# Patient Record
Sex: MALE | Race: ASIAN | Employment: OTHER | URBAN - METROPOLITAN AREA
[De-identification: names, ages, dates, MRNs, and addresses within clinical notes are randomized per-mention and may not be internally consistent; named-entity substitution may affect disease eponyms.]

---

## 2017-03-03 ENCOUNTER — CARE COORDINATION (OUTPATIENT)
Dept: GERIATRIC MEDICINE | Facility: CLINIC | Age: 82
End: 2017-03-03

## 2017-03-03 NOTE — PROGRESS NOTES
[facility] Six-Month Telephone Assessment    6 month telephone assessment completed on 3/3/17. Spoke with Aburto, daughter. She said her dad is doing all right, no concerns.    ER visits: No  Hospitalizations: No  TCU stays: No  Significant health status changes: none  Falls/Injuries: No  ADL/IADL changes: No  Changes in services: No    Caregiver Assessment follow up:  N/A    Goals: See POC in chart for goal progress documentation.      Will see client in 6 months for an annual health risk assessment.   Encouraged client to call CM with any questions or concerns in the meantime.     Corrie ROTHMAN, [facility] Care Coordinator   Telephone: [phone]  Fax: [phone]

## 2017-04-18 ENCOUNTER — OFFICE VISIT (OUTPATIENT)
Dept: FAMILY MEDICINE | Facility: CLINIC | Age: 82
End: 2017-04-18
Payer: COMMERCIAL

## 2017-04-18 VITALS
TEMPERATURE: 98.2 F | BODY MASS INDEX: 23.3 KG/M2 | WEIGHT: 145 LBS | HEART RATE: 87 BPM | OXYGEN SATURATION: 96 % | HEIGHT: 66 IN | DIASTOLIC BLOOD PRESSURE: 76 MMHG | SYSTOLIC BLOOD PRESSURE: 138 MMHG

## 2017-04-18 DIAGNOSIS — R73.01 IMPAIRED FASTING GLUCOSE: ICD-10-CM

## 2017-04-18 DIAGNOSIS — R10.13 ABDOMINAL PAIN, EPIGASTRIC: ICD-10-CM

## 2017-04-18 DIAGNOSIS — R10.13 DYSPEPSIA: Primary | ICD-10-CM

## 2017-04-18 LAB
ALBUMIN SERPL-MCNC: 3.4 G/DL (ref 3.4–5)
ALP SERPL-CCNC: 69 U/L (ref 40–150)
ALT SERPL W P-5'-P-CCNC: 36 U/L (ref 0–70)
ANION GAP SERPL CALCULATED.3IONS-SCNC: 10 MMOL/L (ref 3–14)
AST SERPL W P-5'-P-CCNC: 26 U/L (ref 0–45)
BASOPHILS # BLD AUTO: 0 10E9/L (ref 0–0.2)
BASOPHILS NFR BLD AUTO: 0.6 %
BILIRUB SERPL-MCNC: 0.4 MG/DL (ref 0.2–1.3)
BUN SERPL-MCNC: 21 MG/DL (ref 7–30)
CALCIUM SERPL-MCNC: 9.1 MG/DL (ref 8.5–10.1)
CHLORIDE SERPL-SCNC: 101 MMOL/L (ref 94–109)
CO2 SERPL-SCNC: 29 MMOL/L (ref 20–32)
CREAT SERPL-MCNC: 0.8 MG/DL (ref 0.66–1.25)
DIFFERENTIAL METHOD BLD: NORMAL
EOSINOPHIL # BLD AUTO: 0.4 10E9/L (ref 0–0.7)
EOSINOPHIL NFR BLD AUTO: 7.4 %
ERYTHROCYTE [DISTWIDTH] IN BLOOD BY AUTOMATED COUNT: 12.8 % (ref 10–15)
GFR SERPL CREATININE-BSD FRML MDRD: ABNORMAL ML/MIN/1.7M2
GLUCOSE SERPL-MCNC: 176 MG/DL (ref 70–99)
HBA1C MFR BLD: 7.3 % (ref 4.3–6)
HCT VFR BLD AUTO: 45 % (ref 40–53)
HGB BLD-MCNC: 16 G/DL (ref 13.3–17.7)
LIPASE SERPL-CCNC: 310 U/L (ref 73–393)
LYMPHOCYTES # BLD AUTO: 1.5 10E9/L (ref 0.8–5.3)
LYMPHOCYTES NFR BLD AUTO: 28 %
MCH RBC QN AUTO: 32.7 PG (ref 26.5–33)
MCHC RBC AUTO-ENTMCNC: 35.6 G/DL (ref 31.5–36.5)
MCV RBC AUTO: 92 FL (ref 78–100)
MONOCYTES # BLD AUTO: 0.5 10E9/L (ref 0–1.3)
MONOCYTES NFR BLD AUTO: 10 %
NEUTROPHILS # BLD AUTO: 2.9 10E9/L (ref 1.6–8.3)
NEUTROPHILS NFR BLD AUTO: 54 %
PLATELET # BLD AUTO: 277 10E9/L (ref 150–450)
POTASSIUM SERPL-SCNC: 3.6 MMOL/L (ref 3.4–5.3)
PROT SERPL-MCNC: 7.3 G/DL (ref 6.8–8.8)
RBC # BLD AUTO: 4.89 10E12/L (ref 4.4–5.9)
SODIUM SERPL-SCNC: 140 MMOL/L (ref 133–144)
WBC # BLD AUTO: 5.3 10E9/L (ref 4–11)

## 2017-04-18 PROCEDURE — 80053 COMPREHEN METABOLIC PANEL: CPT | Performed by: FAMILY MEDICINE

## 2017-04-18 PROCEDURE — 83690 ASSAY OF LIPASE: CPT | Performed by: FAMILY MEDICINE

## 2017-04-18 PROCEDURE — 99213 OFFICE O/P EST LOW 20 MIN: CPT | Performed by: FAMILY MEDICINE

## 2017-04-18 PROCEDURE — 83036 HEMOGLOBIN GLYCOSYLATED A1C: CPT | Performed by: FAMILY MEDICINE

## 2017-04-18 PROCEDURE — 36415 COLL VENOUS BLD VENIPUNCTURE: CPT | Performed by: FAMILY MEDICINE

## 2017-04-18 PROCEDURE — 85025 COMPLETE CBC W/AUTO DIFF WBC: CPT | Performed by: FAMILY MEDICINE

## 2017-04-18 ASSESSMENT — PAIN SCALES - GENERAL: PAINLEVEL: MODERATE PAIN (5)

## 2017-04-18 NOTE — PATIENT INSTRUCTIONS
We will send you lab results    Start ranitidine one pill 2x daily    Continue other meds as is    Follow up as needed based on symptoms

## 2017-04-18 NOTE — PROGRESS NOTES
SUBJECTIVE:                                                    Leona David is a 85 year old male who presents to clinic today for the following health issues:       Stomach pain for a month    none    Problem list and histories reviewed & adjusted, as indicated.  Additional history: as documented         Reviewed and updated as needed this visit by clinical staff  Tobacco  Allergies  Meds  Problems  Med Hx  Surg Hx  Fam Hx  Soc Hx        Reviewed and updated as needed this visit by Provider          mid abdomen    For a month    Off and on    Often at night    15-20 minutes, up to an hour     Light pain    No nausea / vomiting        Eating does not make it worse    Feels bloated    Worse with lying down    History of hernia    Trying to eat veggies    Pain not worse with lifting    Eating okay    Bowels okay    No bloody or black stools  Just occasional bit of blood at anal area/ hemorrhoid type    Urinating okay but 2-3 x at night to urinate     Occasional gets acid taste in mouth    Dry mouth in am, feels bittter    Rare etoh        Physical Exam   Constitutional: He is oriented to person, place, and time and well-developed, well-nourished, and in no distress. No distress.   HENT:   Head: Normocephalic and atraumatic.   Eyes: Conjunctivae are normal.   Neck: Carotid bruit is not present.   Cardiovascular: Normal rate, regular rhythm, normal heart sounds and intact distal pulses.  Exam reveals no gallop and no friction rub.    No murmur heard.  Pulmonary/Chest: Effort normal and breath sounds normal. No respiratory distress. He has no wheezes. He has no rales.   Abdominal: Soft. Bowel sounds are normal. He exhibits no distension and no mass. There is no tenderness. There is no rebound and no guarding.   No back or costovertebral angle tenderness    Musculoskeletal: He exhibits no edema.   Neurological: He is alert and oriented to person, place, and time.   Skin: He is not diaphoretic.   Psychiatric: Mood and  affect normal.     Wt is stable      ASSESSMENT / PLAN:  (R10.13) Dyspepsia  (primary encounter diagnosis)  Comment: since symptoms occur when supine at night, possible acid factor.    Plan: ranitidine (ZANTAC) 150 MG tablet        Trial of regular h2 blocker    (R10.13) Abdominal pain, epigastric  Comment: check labs   Plan: CBC with platelets differential, Comprehensive         metabolic panel, Lipase             (R73.01) Impaired fasting glucose  Comment: recheck this also; not on meds for diabetes mellitus   Plan: Hemoglobin A1c             Follow up as needed based on symptoms       I reviewed the patient's medications, allergies, medical history, family history, and social history.    Arie Dahl MD

## 2017-04-18 NOTE — NURSING NOTE
"Chief Complaint   Patient presents with     Gastric Problem       Initial /90 (BP Location: Left arm, Patient Position: Chair, Cuff Size: Adult Regular)  Pulse 87  Temp 98.2  F (36.8  C) (Oral)  Ht 5' 6.3\" (1.684 m)  Wt 145 lb (65.8 kg)  SpO2 96%  BMI 23.19 kg/m2 Estimated body mass index is 23.19 kg/(m^2) as calculated from the following:    Height as of this encounter: 5' 6.3\" (1.684 m).    Weight as of this encounter: 145 lb (65.8 kg).  Medication Reconciliation: complete   Jamila Mcmanus CMA      "

## 2017-04-18 NOTE — LETTER
[facility]  [address] [place]  [address]  [phone]        April 20, 2017    Fredyu X Frankie  [address]  [facility] [address]        Dear Thu,    The diabetes test ( hemoglobin a1c ) is higher than last time, but still no need for diabetes medication.     We should recheck this in about 6 months.     Other labs are fine.     Results for orders placed or performed in visit on 04/18/17   CBC with platelets differential   Result Value Ref Range    WBC 5.3 4.0 - 11.0 10e9/L    RBC Count 4.89 4.4 - 5.9 10e12/L    Hemoglobin 16.0 13.3 - 17.7 g/dL    Hematocrit 45.0 40.0 - 53.0 %    MCV 92 78 - 100 fl    MCH 32.7 26.5 - 33.0 pg    MCHC 35.6 31.5 - 36.5 g/dL    RDW 12.8 10.0 - 15.0 %    Platelet Count 277 150 - 450 10e9/L    Diff Method Automated Method     % Neutrophils 54.0 %    % Lymphocytes 28.0 %    % Monocytes 10.0 %    % Eosinophils 7.4 %    % Basophils 0.6 %    Absolute Neutrophil 2.9 1.6 - 8.3 10e9/L    Absolute Lymphocytes 1.5 0.8 - 5.3 10e9/L    Absolute Monocytes 0.5 0.0 - 1.3 10e9/L    Absolute Eosinophils 0.4 0.0 - 0.7 10e9/L    Absolute Basophils 0.0 0.0 - 0.2 10e9/L   Comprehensive metabolic panel   Result Value Ref Range    Sodium 140 133 - 144 mmol/L    Potassium 3.6 3.4 - 5.3 mmol/L    Chloride 101 94 - 109 mmol/L    Carbon Dioxide 29 20 - 32 mmol/L    Anion Gap 10 3 - 14 mmol/L    Glucose 176 (H) 70 - 99 mg/dL    Urea Nitrogen 21 7 - 30 mg/dL    Creatinine 0.80 0.66 - 1.25 mg/dL    GFR Estimate >90  Non  GFR Calc   >60 mL/min/1.7m2    GFR Estimate If Black >90   GFR Calc   >60 mL/min/1.7m2    Calcium 9.1 8.5 - 10.1 mg/dL    Bilirubin Total 0.4 0.2 - 1.3 mg/dL    Albumin 3.4 3.4 - 5.0 g/dL    Protein Total 7.3 6.8 - 8.8 g/dL    Alkaline Phosphatase 69 40 - 150 U/L    ALT 36 0 - 70 U/L    AST 26 0 - 45 U/L   Lipase   Result Value Ref Range    Lipase 310 73 - 393 U/L   Hemoglobin A1c   Result Value Ref Range    Hemoglobin  A1C 7.3 (H) 4.3 - 6.0 %       If you have any questions please call the clinic at [phone].    Sincerely,    Arie PELAEZL

## 2017-04-18 NOTE — MR AVS SNAPSHOT
"              After Visit Summary   4/18/2017    Leona David    MRN: [id number]           Patient Information     Date Of Birth          10/20/1931        Visit Information        Provider Department      4/18/2017 11:00 AM Arie Dahl MD; [employer] [facility]        Today's Diagnoses     Dyspepsia    -  1    Abdominal pain, epigastric        Impaired fasting glucose          Care Instructions    We will send you lab results    Start ranitidine one pill 2x daily    Continue other meds as is    Follow up as needed based on symptoms         Follow-ups after your visit        Who to contact     If you have questions or need follow up information about today's clinic visit or your schedule please contact [facility] directly at [phone].  Normal or non-critical lab and imaging results will be communicated to you by MyChart, letter or phone within 4 business days after the clinic has received the results. If you do not hear from us within 7 days, please contact the clinic through [employer] or phone. If you have a critical or abnormal lab result, we will notify you by phone as soon as possible.  Submit refill requests through [employer] or call your pharmacy and they will forward the refill request to us. Please allow 3 business days for your refill to be completed.          Additional Information About Your Visit        MyChart Information     [employer] lets you send messages to your doctor, view your test results, renew your prescriptions, schedule appointments and more. To sign up, go to [email or web address] . Click on \"Log in\" on the left side of the screen, which will take you to the Welcome page. Then click on \"Sign up Now\" on the right side of the page.     You will be asked to enter the access code listed below, as well as some personal information. Please follow the directions to create your username and password.     Your access code is: " "[email or web address]  Expires: 2017 11:41 AM     Your access code will  in 90 days. If you need help or a new code, please call your [facility] or [phone].        Care EveryWhere ID     This is your Care EveryWhere ID. This could be used by other organizations to access your [place] medical records  [id number]        Your Vitals Were     Pulse Temperature Height Pulse Oximetry BMI (Body Mass Index)       87 98.2  F (36.8  C) (Oral) 5' 6.3\" (1.684 m) 96% 23.19 kg/m2        Blood Pressure from Last 3 Encounters:   17 138/76   16 129/77   11/27/15 140/74    Weight from Last 3 Encounters:   17 145 lb (65.8 kg)   16 140 lb (63.5 kg)   11/27/15 148 lb (67.1 kg)              We Performed the Following     CBC with platelets differential     Comprehensive metabolic panel     Hemoglobin A1c     Lipase          Today's Medication Changes          These changes are accurate as of: 17 11:41 AM.  If you have any questions, ask your nurse or doctor.               Start taking these medicines.        Dose/Directions    ranitidine 150 MG tablet   Commonly known as:  ZANTAC   Used for:  Dyspepsia   Started by:  Arie Dahl MD        Dose:  150 mg   Take 1 tablet (150 mg) by mouth 2 times daily   Quantity:  60 tablet   Refills:  11            Where to get your medicines      These medications were sent to [facility] [address] [place] [address]     Phone:  [phone]     ranitidine 150 MG tablet                Primary Care Provider Office Phone # Fax #    Arie Dahl [phone]       [facility] [address] [facility] [address]        Thank you!     Thank you for choosing [facility]  for your care. Our goal is always to provide you with excellent care. Hearing back from our patients is one way we can continue to improve our services. Please take " a few minutes to complete the written survey that you may receive in the mail after your visit with us. Thank you!             Your Updated Medication List - Protect others around you: Learn how to safely use, store and throw away your medicines at www.disposemymeds.org.          This list is accurate as of: 4/18/17 11:41 AM.  Always use your most recent med list.                   Brand Name Dispense Instructions for use    aspirin 81 MG EC tablet      1 TABLET DAILY       atorvastatin 10 MG tablet    LIPITOR    90 tablet    Take 1 tablet (10 mg) by mouth daily       CALCIUM 600/VITAMIN D3 PO      Take 600 mg by mouth daily       cyanocolbalamin 100 MCG tablet    vitamin  B-12     Take 50 mcg by mouth daily       Fish Oil 645 MG Caps      Take 645 mg by mouth daily       hydrochlorothiazide 12.5 MG Tabs tablet     90 tablet    Take 1 tablet (12.5 mg) by mouth daily       melatonin 3 MG tablet     90 tablet    Take 1 tablet (3 mg) by mouth nightly as needed for sleep       MULTIVITAMIN PO      Take by mouth daily       ranitidine 150 MG tablet    ZANTAC    60 tablet    Take 1 tablet (150 mg) by mouth 2 times daily       vitamin  B-1 250 MG Tabs      Take 250 mg by mouth daily

## 2017-04-20 NOTE — PROGRESS NOTES
The diabetes test ( hemoglobin a1c ) is higher than last time, but still no need for diabetes medication.    We should recheck this in about 6 months.    Other labs are fine.    Arie Dahl MD

## 2017-05-23 ENCOUNTER — TELEPHONE (OUTPATIENT)
Dept: FAMILY MEDICINE | Facility: CLINIC | Age: 82
End: 2017-05-23
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
## 2017-05-23 NOTE — TELEPHONE ENCOUNTER
Reason for Call:  question    Detailed comments: the patients daughter Lamonte Sampson wants to know if the patient should be seen because he was not feeling well and didn't want to fly on a air plane, please call her back     Phone Number Patient can be reached at: Other phone number:  [phone]    Best Time: any    Can we leave a detailed message on this number? YES    Call taken on 5/23/2017 at 8:25 AM by Susan Holman

## 2017-05-23 NOTE — TELEPHONE ENCOUNTER
TC notified patient's daughter Lamonte that there is a letter available for  at the [facility] .

## 2017-05-23 NOTE — LETTER
[address] [address] [phone]             May 23, 2017    To whom it may concern:    Leona David (  [birth year] ) is an 85 year old patient of mine who is not currently medically able to fly.               Sincerely,                     Arie Dahl MD

## 2017-08-22 ENCOUNTER — TELEPHONE (OUTPATIENT)
Dept: FAMILY MEDICINE | Facility: CLINIC | Age: 82
End: 2017-08-22

## 2017-08-22 NOTE — TELEPHONE ENCOUNTER
Forms received from: [facility]   Phone number listed: n/a   Fax listed: n/a  Date received: 8-22-17  Form description: health care summary  Once forms are completed, please return to [facility] via mail.  Is patient requesting to be contacted when forms are completed: n/a  Form placed: provider desk  Isatu Garcia

## 2017-09-06 ENCOUNTER — CARE COORDINATION (OUTPATIENT)
Dept: GERIATRIC MEDICINE | Facility: CLINIC | Age: 82
End: 2017-09-06
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
## 2017-09-27 ENCOUNTER — CARE COORDINATION (OUTPATIENT)
Dept: GERIATRIC MEDICINE | Facility: CLINIC | Age: 82
End: 2017-09-27

## 2017-09-27 NOTE — LETTER
[facility] [address] [address]  [phone]           2017    Order for Leona David (  10-- ):    Every other week RN visits.    Arie Dahl MD

## 2017-09-29 RX ORDER — SODIUM CHLORIDE 5 %
1 OINTMENT (GRAM) OPHTHALMIC (EYE) AT BEDTIME
COMMUNITY
End: 2022-04-27

## 2017-09-29 NOTE — PROGRESS NOTES
Home visit/Edy Risk Assessment/EW screening completed on: 9/27/17. Present was Thu, his wife; Lillian, daughter Lamonte. Lamonte also served an  per Thu's wishes.  Member resides: in a subsidized one bedroom apt with his wife.  Member currently receiving the following services: adult day care with transportation, homemaking  See EMR for a list of client's diagnoses and medications.   Medication management: Medications reviewed: Med reconciliation done. Medication management: Independent and sets up on own. Medication understanding: Patient has understanding of regimen and is adherent No, Thu forgets to take his medications some days. I saw his pill box and previous days still had pills in the compartments. Thu admitted he could use some help with his meds. I offered having a nurse set up a med machine. Thu agreed to this.  Falls:none reported.  ADL's/IADL's:  Independent with: ADL's.                                     Dependent with: all IADL's   Member Mood/behavior-PHQ9 score:  declined  [facility] sponsored benefits: Shared information re: Silver Sneakers/gym memberships, ASA, Calcium +D.  Plan of Care Is: adult day care and homemaking will continue. Referral for SKN visits will be made. Discussed HCD and he wants to write a letter to his kids explaining his wishes first, then he will do a HCD.  Caregiver support: daughter Lamonte is primary caregiver. She told me when her parents were not in the room that her dad is back at  so doesn't want me to bring up anything from the past. She will keep me informed if he changes his mind again. I told her it would be fine if he doesn't want to go, my concern is that he doesn't allow Lillian to go.   Reviewed Community wide emergency planning:  Release of Information: signed and in chart.  Follow-Up Plan: Member informed of future contact, plan to f/u with member with a 6 month telephone assessment.  Contact information shared with member and family, encouraged member  to call with any questions or concerns prior to this.  See [facility]UST for further detailed information    Corrie ROTHMAN, [facility] Care Coordinator   Telephone: [phone]  Fax: [phone]

## 2017-10-03 ENCOUNTER — CARE COORDINATION (OUTPATIENT)
Dept: GERIATRIC MEDICINE | Facility: CLINIC | Age: 82
End: 2017-10-03

## 2017-10-03 ENCOUNTER — TELEPHONE (OUTPATIENT)
Dept: FAMILY MEDICINE | Facility: CLINIC | Age: 82
End: 2017-10-03

## 2017-10-03 NOTE — PROGRESS NOTES
10/3/17-  I called  HC to make referral for SKN e/o/w for med set up and monitoring, was told Thu needs a F2F and an order from PCP.     Called Aburto to explain her dad needs a face to face with Dr Dahl in order for home care (nursing) to start. She is leaving in 2 weeks for [place] but will do her best to take him.     Corrie ROTHMAN, [facility] Care Coordinator   Telephone: [phone]  Fax: [phone]

## 2017-10-03 NOTE — TELEPHONE ENCOUNTER
Reason for Call:  Other call back    Detailed comments:  Patient is needing face to face for home care    Phone Number Patient can be reached at: [phone]    Best Time:  Anytime    Can we leave a detailed message on this number? YES    Call taken on 10/3/2017 at 1:12 PM by Yareli Rivas

## 2017-10-03 NOTE — TELEPHONE ENCOUNTER
"Attempt # 1  Called patient at home number.  Was call answered? Yes,   Daughter states father needs a face to face due to the  discovered patient forgot to take pills - wants HC to bring in a machine and set up pills for him and needs a face to face.  Daughter also states father complaining of pain in buttock when sits down due to \"flesh sticking out\" and has one on his neck also. Scheduled appointment for Wednesday.      Teresita Daniel RN  [facility]      "

## 2017-10-04 ENCOUNTER — TELEPHONE (OUTPATIENT)
Dept: FAMILY MEDICINE | Facility: CLINIC | Age: 82
End: 2017-10-04

## 2017-10-04 NOTE — TELEPHONE ENCOUNTER
Reason for Call: Request for an order or referral:    Order or referral being requested: Verbal nursing orders for Med management     Date needed: as soon as possible    Has the patient been seen by the PCP for this problem? YES      Phone number Patient can be reached at:  Other phone number: [phone]    Best Time:  any    Can we leave a detailed message on this number?  YES    Call taken on 10/4/2017 at 3:26 PM by Suasn Holman

## 2017-10-04 NOTE — TELEPHONE ENCOUNTER
Routing to PCP to review and advise.  Orders for med management  Teresita Daniel RN  [facility]

## 2017-10-04 NOTE — TELEPHONE ENCOUNTER
Attempt # 1  Called patient at home number.  Was call answered?  No, left detailed message and to call nurse line if questions.  Teresita Daniel RN  [facility]

## 2017-10-05 NOTE — PROGRESS NOTES
10/3/17-   Asked Savanna to make referral to [facility] for MD2 machine.    Received email from [facility].  I called this in. When you know more about the nurses that will be involved and who they should contact for set up, then call Gabriela at [phone]. Thanks !    Savanna Mckinney  Case Management Specialist     Corrie ROTHMAN, [facility] Care Coordinator   Telephone: [phone]  Fax: [phone]    10/4/17-  Received message from Ivette at [facility]. [phone]  She wondered if I am ordering a MD2 machine for Thu?    Corrie ROTHMAN, [facility] Care Coordinator   Telephone: [phone]  Fax: [phone]    10/5/17-  Tried Ivette back and spoke with her co-worker Clary. Let her know [facility] (Gabriela)would like to coordinate the nurse's visit with their install date so the machine can be set up. Gabriela's number is [phone].     Received a call back from Clary webb bit later. She said she got orders from the clinic but when she called the daughter to set up appt she said her dad refused to go to the clinic yesterday for the FtoF and he doesn't want the machine.     Lamonte also had left me a message that he told her he didn't feel good so couldn't go to the clinic but when she asked her mom if this was true, she said no, he was fine.   Lamonte said he is very stubborn and if he doesn't want something he will be resistive and you won't get anywhere. She will talk to him this weekend or next weekend and see if she can change his mind. Will put this on hold for now.     Corrie ROTHMAN, [facility] Care Coordinator   Telephone: [phone]  Fax: [phone]    10/6/17-  Called [facility] and spoke with Ivette. She will keep this request open for now until she hears back from me. She will let Dr Dahl know of delay.     Corrie ROTHMAN, [facility] Care Coordinator   Telephone: [phone]  Fax: [phone]

## 2017-10-06 ENCOUNTER — TELEPHONE (OUTPATIENT)
Dept: FAMILY MEDICINE | Facility: CLINIC | Age: 82
End: 2017-10-06

## 2017-10-06 NOTE — TELEPHONE ENCOUNTER
Routing to PCP to review.    See below message.    Teresita Daniel RN  [facility]

## 2017-10-06 NOTE — TELEPHONE ENCOUNTER
Reason for Call:  Other     Detailed comments: Nurse wanted provider to know that at this time patient has refused home care and that daughter is going to speak with him regarding this. At this time they will put the case on hold    Phone Number Patient can be reached at: Other phone number:    [facility] Ivette [phone]         Best Time:     Can we leave a detailed message on this number?     Call taken on 10/6/2017 at 11:53 AM by Melany Garcia

## 2017-10-18 ENCOUNTER — CARE COORDINATION (OUTPATIENT)
Dept: GERIATRIC MEDICINE | Facility: CLINIC | Age: 82
End: 2017-10-18

## 2017-10-23 NOTE — PROGRESS NOTES
10/18/17-  Received message from Ivette at [facility]. Asked if I have any updates regarding the med machine.     Corrie ROTHMAN, [facility] Care Coordinator   Telephone: [phone]  Fax: [phone]    10/19/17-  Called Aburto. She hasn't had a chance to talk to her dad yet but can still do it. She said her brother is taking him to the doctor on Wednesday for skin tags and a few other things. I will see if Dr Dahl will talk to Thu about the importance of taking his meds correctly by using the med machine.     Called Ivette at [facility] and let her co-worker know above info.    Corrie ROTHMAN, [facility] Care Coordinator   Telephone: [phone]  Fax: [phone]

## 2017-10-25 ENCOUNTER — OFFICE VISIT (OUTPATIENT)
Dept: FAMILY MEDICINE | Facility: CLINIC | Age: 82
End: 2017-10-25
Payer: COMMERCIAL

## 2017-10-25 VITALS
WEIGHT: 149 LBS | HEART RATE: 88 BPM | OXYGEN SATURATION: 98 % | BODY MASS INDEX: 23.83 KG/M2 | TEMPERATURE: 97.7 F | SYSTOLIC BLOOD PRESSURE: 123 MMHG | DIASTOLIC BLOOD PRESSURE: 74 MMHG

## 2017-10-25 DIAGNOSIS — L91.8 CUTANEOUS SKIN TAGS: ICD-10-CM

## 2017-10-25 DIAGNOSIS — I10 HYPERTENSION GOAL BP (BLOOD PRESSURE) < 140/90: ICD-10-CM

## 2017-10-25 DIAGNOSIS — R10.13 ABDOMINAL PAIN, EPIGASTRIC: Primary | ICD-10-CM

## 2017-10-25 PROCEDURE — 99213 OFFICE O/P EST LOW 20 MIN: CPT | Mod: 25 | Performed by: FAMILY MEDICINE

## 2017-10-25 PROCEDURE — 11200 RMVL SKIN TAGS UP TO&INC 15: CPT | Performed by: FAMILY MEDICINE

## 2017-10-25 ASSESSMENT — PAIN SCALES - GENERAL: PAINLEVEL: NO PAIN (0)

## 2017-10-25 NOTE — PROGRESS NOTES
SUBJECTIVE:   Leona David is a 86 year old male who presents to clinic today for the following health issues:       Remove skin tags from body  Blood test for H Pylori. States he has a stomach pain every morning. His wife had the same problem and she has H Pylori    none    Problem list and histories reviewed & adjusted, as indicated.  Additional history: as documented         Reviewed and updated as needed this visit by clinical staff  Allergies  Meds       Reviewed and updated as needed this visit by Provider          stomach pain off and on     Patient has never had h pylori before          Bowels and bladder okay      In April we had seen patient, advised ranitidine    Full physical not done     Mentation and affect are fine    No tremor of speech or extremity    On exam abd soft nontender.    He has multiple skin tags present.  A very large one left buttock area.  Multiple smaller ones neck/ torso.      He wanted these off.  With consent, we proceeded.      Wiped all with alcohol pads.  Use lidocaine with epi on the large buttock one.      Then proceeded to snip them all off with sterile scissors / tweezers.  No complications.  Tolerated fine.  Bandages applied as needed.  Removed 12 total.    ASSESSMENT / PLAN:  (R10.13) Abdominal pain, epigastric  (primary encounter diagnosis)  Comment: prudent to check h pylori.  Treat appropriately based on results.  Of note, the h2 blocker in spring did not resolve his symptoms.   Plan: H Pylori antigen, stool             (L91.8) Cutaneous skin tags  Comment: removed here   Plan: REMOVAL OF SKIN TAGS, FIRST 15        Follow up prn     (I10) Hypertension goal BP (blood pressure) < 140/90  Comment: at goal   Plan: no change       I reviewed the patient's medications, allergies, medical history, family history, and social history.    Arie Dahl MD

## 2017-10-25 NOTE — NURSING NOTE
"Chief Complaint   Patient presents with     Skin Tags     Patient Request     wants to be checked for H Pylori     Health Maintenance       Initial /74 (BP Location: Left arm, Patient Position: Chair, Cuff Size: Adult Regular)  Pulse 88  Temp 97.7  F (36.5  C) (Oral)  Wt 149 lb (67.6 kg)  SpO2 98%  BMI 23.83 kg/m2 Estimated body mass index is 23.83 kg/(m^2) as calculated from the following:    Height as of 4/18/17: 5' 6.3\" (1.684 m).    Weight as of this encounter: 149 lb (67.6 kg).  Medication Reconciliation: complete   Jamila Mcmanus CMA      "

## 2017-10-25 NOTE — MR AVS SNAPSHOT
"              After Visit Summary   10/25/2017    Leona David    MRN: [id number]           Patient Information     Date Of Birth          10/20/1931        Visit Information        Provider Department      10/25/2017 3:15 PM Arie Dahl MD; [employer] [facility]        Today's Diagnoses     Abdominal pain, epigastric    -  1      Care Instructions    Change bandages as needed for skin tags    Return the stool test    We will notify you of lab result          Follow-ups after your visit        Future tests that were ordered for you today     Open Future Orders        Priority Expected Expires Ordered    H Pylori antigen, stool Routine  11/24/2017 10/25/2017            Who to contact     If you have questions or need follow up information about today's clinic visit or your schedule please contact [facility] directly at [phone].  Normal or non-critical lab and imaging results will be communicated to you by MyChart, letter or phone within 4 business days after the clinic has received the results. If you do not hear from us within 7 days, please contact the clinic through MyChart or phone. If you have a critical or abnormal lab result, we will notify you by phone as soon as possible.  Submit refill requests through [email or web address] or call your pharmacy and they will forward the refill request to us. Please allow 3 business days for your refill to be completed.          Additional Information About Your Visit        Jule Gamehart Information     [email or web address] lets you send messages to your doctor, view your test results, renew your prescriptions, schedule appointments and more. To sign up, go to [email or web address] . Click on \"Log in\" on the left side of the screen, which will take you to the Welcome page. Then click on \"Sign up Now\" on the right side of the page.     You will be asked to enter the access code listed below, as well as some personal information. " Please follow the directions to create your username and password.     Your access code is: [email or web address]  Expires: 2018  3:54 PM     Your access code will  in 90 days. If you need help or a new code, please call your [facility] clinic or [phone].        Care EveryWhere ID     This is your Care EveryWhere ID. This could be used by other organizations to access your [facility] medical records  [id number]        Your Vitals Were     Pulse Temperature Pulse Oximetry BMI (Body Mass Index)          88 97.7  F (36.5  C) (Oral) 98% 23.83 kg/m2         Blood Pressure from Last 3 Encounters:   10/25/17 123/74   17 138/76   16 129/77    Weight from Last 3 Encounters:   10/25/17 149 lb (67.6 kg)   17 145 lb (65.8 kg)   16 140 lb (63.5 kg)               Primary Care Provider Office Phone # Fax #    Arie Dahl [phone]       [address] [facility] [address]        Equal Access to Services     [facility] : Hadii aad ku hadasho Soomaali, waaxda luqadaha, qaybta kaalmada adeegyada, virgilio leongin hayaan adeluigi woods . So [facility] [phone].    ATENCIÓN: Si habla español, tiene a hernandez disposición servicios gratuitos de asistencia lingüística. Llame al [phone].    We comply with applicable federal civil rights laws and [place] laws. We do not discriminate on the basis of race, color, national origin, age, disability, sex, sexual orientation, or gender identity.            Thank you!     Thank you for choosing [facility]  for your care. Our goal is always to provide you with excellent care. Hearing back from our patients is one way we can continue to improve our services. Please take a few minutes to complete the written survey that you may receive in the mail after your visit with us. Thank you!             Your Updated Medication List - Protect others around you: Learn how to safely use, store and throw away your medicines  at www.disposemymeds.org.          This list is accurate as of: 10/25/17  3:54 PM.  Always use your most recent med list.                   Brand Name Dispense Instructions for use Diagnosis    aspirin 81 MG EC tablet      1 TABLET DAILY        atorvastatin 10 MG tablet    LIPITOR    90 tablet    Take 1 tablet (10 mg) by mouth daily    Hyperlipidemia with target LDL less than 130       CALCIUM 600/VITAMIN D3 PO      Take 600 mg by mouth daily        cyanocolbalamin 100 MCG tablet    vitamin  B-12     Take 50 mcg by mouth daily        Fish Oil 645 MG Caps      Take 645 mg by mouth daily        hydrochlorothiazide 12.5 MG Tabs tablet     90 tablet    Take 1 tablet (12.5 mg) by mouth daily    Essential hypertension with goal blood pressure less than 140/90       MULTIVITAMIN PO      Take by mouth daily        sodium chloride 5 % ophthalmic ointment    EMILIO 128     1 Application At Bedtime        vitamin  B-1 250 MG Tabs      Take 250 mg by mouth daily

## 2017-10-25 NOTE — PATIENT INSTRUCTIONS
Change bandages as needed for skin tags    Return the stool test    We will notify you of lab result

## 2017-10-26 ENCOUNTER — CARE COORDINATION (OUTPATIENT)
Dept: GERIATRIC MEDICINE | Facility: CLINIC | Age: 82
End: 2017-10-26

## 2017-10-26 NOTE — PROGRESS NOTES
10/26/17-  Called Aburto to see if she knows how the appt went yesterday with her dad and PCP regarding the med machine. She hasn't had a chance to talk to her brother but will tonight and let me know tomorrow.     Corrie ROTHMAN, [facility] Care Coordinator   Telephone: [phone]  Fax: [phone]

## 2017-10-27 ENCOUNTER — CARE COORDINATION (OUTPATIENT)
Dept: GERIATRIC MEDICINE | Facility: CLINIC | Age: 82
End: 2017-10-27

## 2017-10-27 NOTE — PROGRESS NOTES
10/27/17-  Received call back from Lamonte. She talked to her brother who said the med machine issue wasn't brought up at the appt  We will drop this issue as Thu doesn't want the machine. Lamonte said she purchased a med minder box with more slots per day so maybe this will help her dad see all his meds he needs to take daily.     Corrie ROTHMAN, [facility] Coordinator   Telephone: [phone]  Fax: [phone]

## 2017-10-29 PROCEDURE — 87338 HPYLORI STOOL AG IA: CPT | Performed by: FAMILY MEDICINE

## 2017-10-30 DIAGNOSIS — R10.13 ABDOMINAL PAIN, EPIGASTRIC: ICD-10-CM

## 2017-10-30 NOTE — LETTER
[facility]  [address] [place]  [address]  [phone]        November 1, 2017    Thu X Frankie  [address]  [facility] [address]        Dear Thu,    As I explained to your daughter by phone, you do have the H pylori bacteria.  I sent in prescriptions to your pharmacy.  You take two antibiotics and one acid reducing pill, all 2x daily for 2 weeks.  After that, take the acid reducer ( omeprazole ) once daily for another month.    Follow up in clinic if symptoms not resolved after that.  Call or be seen sooner if needed.    One detail; do not take your cholesterol pill ( atorvastatin ) for the two weeks while on the antibiotics.    Results for orders placed or performed in visit on 10/30/17   H Pylori antigen, stool   Result Value Ref Range    Specimen Description Feces     H Pylori Antigen (A)      Positive for Helicobacter pylori antigen by enzyme immunoassay. A positive result   indicates the presence of H. pylori antigen.         If you have any questions please call the clinic at [phone].    Sincerely,    Arie SMITH

## 2017-10-31 LAB
H PYLORI AG STL QL IA: ABNORMAL
SPECIMEN SOURCE: ABNORMAL

## 2017-11-01 ENCOUNTER — TELEPHONE (OUTPATIENT)
Dept: FAMILY MEDICINE | Facility: CLINIC | Age: 82
End: 2017-11-01

## 2017-11-01 DIAGNOSIS — A04.8 H. PYLORI INFECTION: Primary | ICD-10-CM

## 2017-11-01 RX ORDER — CLARITHROMYCIN 500 MG
500 TABLET ORAL 2 TIMES DAILY
Qty: 28 TABLET | Refills: 0 | Status: SHIPPED | OUTPATIENT
Start: 2017-11-01 | End: 2017-11-15

## 2017-11-01 RX ORDER — AMOXICILLIN 500 MG/1
1000 CAPSULE ORAL 2 TIMES DAILY
Qty: 56 CAPSULE | Refills: 0 | Status: SHIPPED | OUTPATIENT
Start: 2017-11-01 | End: 2017-11-15

## 2017-11-01 NOTE — PROGRESS NOTES
As I explained to your daughter by phone, you do have the H pylori bacteria.  I sent in prescriptions to your pharmacy.  You take two antibiotics and one acid reducing pill, all 2x daily for 2 weeks.  After that, take the acid reducer ( omeprazole ) once daily for another month.    Follow up in clinic if symptoms not resolved after that.  Call or be seen sooner if needed.    One detail; do not take your cholesterol pill ( atorvastatin ) for the two weeks while on the antibiotics.    Arie Dahl MD

## 2017-11-01 NOTE — TELEPHONE ENCOUNTER
I called Samaria ( daughter ) about the h pylori result.  Positive, so sent in prescriptions. Follow up in nic if symptoms not resolving.     Arie Dahl MD

## 2017-11-07 ENCOUNTER — CARE COORDINATION (OUTPATIENT)
Dept: GERIATRIC MEDICINE | Facility: CLINIC | Age: 82
End: 2017-11-07

## 2017-11-07 DIAGNOSIS — Z76.89 HEALTH CARE HOME: ICD-10-CM

## 2017-11-10 NOTE — PROGRESS NOTES
11/7/17-  Received message from Will at [facility] [place]. He said he is getting out of the waiver system as it is not profitable. He will be transferring Thu's case to the [facility] office. Alice [phone].    Corrie ROTHMAN, [facility] Coordinator   Telephone: [phone]  Fax: [phone]    11/8/17-  Called Will back and LM. Asked end and start dates.    Corrie ROTHMAN, [facility] Coordinator   Telephone: [phone]  Fax: [phone]      11/10/17-  Called Alice. She said she will get out to do the opening the week of 11/20.  I will write the auth.    Corrie ROTHMAN, [facility] Coordinator   Telephone: [phone]  Fax: [phone]

## 2017-11-11 DIAGNOSIS — I10 ESSENTIAL HYPERTENSION WITH GOAL BLOOD PRESSURE LESS THAN 140/90: ICD-10-CM

## 2017-11-13 RX ORDER — HYDROCHLOROTHIAZIDE 12.5 MG/1
TABLET ORAL
Qty: 90 TABLET | Refills: 2 | Status: SHIPPED | OUTPATIENT
Start: 2017-11-13 | End: 2018-07-23

## 2017-11-13 NOTE — TELEPHONE ENCOUNTER
Requested Prescriptions   Pending Prescriptions Disp Refills     hydrochlorothiazide 12.5 MG TABS tablet [Pharmacy Med Name: HydroCHLOROthiazide Oral Tablet 12.5 MG] 90 tablet 2     Sig: TAKE ONE TABLET BY MOUTH ONE TIME DAILY    Diuretics (Including Combos) Protocol Passed    11/11/2017  7:00 AM       Passed - Blood pressure under 140/90    BP Readings from Last 3 Encounters:   10/25/17 123/74   04/18/17 138/76   11/01/16 129/77                Passed - Recent or future visit with authorizing provider's specialty    Patient had office visit in the last year or has a visit in the next 30 days with authorizing provider.  See chart review.              Passed - Patient is age 18 or older       Passed - Normal serum creatinine on file in past 12 months    Recent Labs   Lab Test  04/18/17   1144   CR  0.80             Passed - Normal serum potassium on file in past 12 months    Recent Labs   Lab Test  04/18/17   1144   POTASSIUM  3.6                   Passed - Normal serum sodium on file in past 12 months    Recent Labs   Lab Test  04/18/17   1144   NA  140

## 2017-11-13 NOTE — TELEPHONE ENCOUNTER
Prescription approved per [facility] Refill Protocol.    Yary Thomas RN  [facility]

## 2017-11-17 ENCOUNTER — CARE COORDINATION (OUTPATIENT)
Dept: GERIATRIC MEDICINE | Facility: CLINIC | Age: 82
End: 2017-11-17

## 2017-11-22 ENCOUNTER — OFFICE VISIT (OUTPATIENT)
Dept: FAMILY MEDICINE | Facility: CLINIC | Age: 82
End: 2017-11-22
Payer: COMMERCIAL

## 2017-11-22 VITALS
HEART RATE: 97 BPM | HEIGHT: 66 IN | DIASTOLIC BLOOD PRESSURE: 82 MMHG | WEIGHT: 145.8 LBS | BODY MASS INDEX: 23.43 KG/M2 | TEMPERATURE: 97 F | SYSTOLIC BLOOD PRESSURE: 152 MMHG

## 2017-11-22 DIAGNOSIS — I10 HYPERTENSION GOAL BP (BLOOD PRESSURE) < 140/90: ICD-10-CM

## 2017-11-22 DIAGNOSIS — R21 RASH: ICD-10-CM

## 2017-11-22 DIAGNOSIS — R25.1 TREMOR: Primary | ICD-10-CM

## 2017-11-22 DIAGNOSIS — R73.01 IMPAIRED FASTING GLUCOSE: ICD-10-CM

## 2017-11-22 DIAGNOSIS — R14.0 ABDOMINAL DISTENSION: ICD-10-CM

## 2017-11-22 DIAGNOSIS — R26.89 SHUFFLING GAIT: ICD-10-CM

## 2017-11-22 DIAGNOSIS — R41.89 COGNITIVE CHANGES: ICD-10-CM

## 2017-11-22 DIAGNOSIS — E78.5 HYPERLIPIDEMIA WITH TARGET LDL LESS THAN 130: ICD-10-CM

## 2017-11-22 DIAGNOSIS — R53.83 FATIGUE, UNSPECIFIED TYPE: ICD-10-CM

## 2017-11-22 DIAGNOSIS — A04.8 H. PYLORI INFECTION: ICD-10-CM

## 2017-11-22 LAB
ALBUMIN SERPL-MCNC: 3.4 G/DL (ref 3.4–5)
ALP SERPL-CCNC: 72 U/L (ref 40–150)
ALT SERPL W P-5'-P-CCNC: 44 U/L (ref 0–70)
ANION GAP SERPL CALCULATED.3IONS-SCNC: 11 MMOL/L (ref 3–14)
AST SERPL W P-5'-P-CCNC: 32 U/L (ref 0–45)
BASOPHILS # BLD AUTO: 0 10E9/L (ref 0–0.2)
BASOPHILS NFR BLD AUTO: 0.8 %
BILIRUB SERPL-MCNC: 0.3 MG/DL (ref 0.2–1.3)
BUN SERPL-MCNC: 16 MG/DL (ref 7–30)
CALCIUM SERPL-MCNC: 8.4 MG/DL (ref 8.5–10.1)
CHLORIDE SERPL-SCNC: 107 MMOL/L (ref 94–109)
CO2 SERPL-SCNC: 24 MMOL/L (ref 20–32)
CREAT SERPL-MCNC: 0.77 MG/DL (ref 0.66–1.25)
DIFFERENTIAL METHOD BLD: ABNORMAL
EOSINOPHIL # BLD AUTO: 0.2 10E9/L (ref 0–0.7)
EOSINOPHIL NFR BLD AUTO: 4.5 %
ERYTHROCYTE [DISTWIDTH] IN BLOOD BY AUTOMATED COUNT: 12.4 % (ref 10–15)
GFR SERPL CREATININE-BSD FRML MDRD: >90 ML/MIN/1.7M2
GLUCOSE SERPL-MCNC: 239 MG/DL (ref 70–99)
HBA1C MFR BLD: 8 % (ref 4.3–6)
HCT VFR BLD AUTO: 44.7 % (ref 40–53)
HGB BLD-MCNC: 15.8 G/DL (ref 13.3–17.7)
LYMPHOCYTES # BLD AUTO: 1 10E9/L (ref 0.8–5.3)
LYMPHOCYTES NFR BLD AUTO: 27.7 %
MCH RBC QN AUTO: 32.2 PG (ref 26.5–33)
MCHC RBC AUTO-ENTMCNC: 35.3 G/DL (ref 31.5–36.5)
MCV RBC AUTO: 91 FL (ref 78–100)
MONOCYTES # BLD AUTO: 0.3 10E9/L (ref 0–1.3)
MONOCYTES NFR BLD AUTO: 8.9 %
NEUTROPHILS # BLD AUTO: 2.1 10E9/L (ref 1.6–8.3)
NEUTROPHILS NFR BLD AUTO: 58.1 %
PLATELET # BLD AUTO: 124 10E9/L (ref 150–450)
POTASSIUM SERPL-SCNC: 3.6 MMOL/L (ref 3.4–5.3)
PROT SERPL-MCNC: 7.4 G/DL (ref 6.8–8.8)
RBC # BLD AUTO: 4.91 10E12/L (ref 4.4–5.9)
SODIUM SERPL-SCNC: 142 MMOL/L (ref 133–144)
TSH SERPL DL<=0.005 MIU/L-ACNC: 1.08 MU/L (ref 0.4–4)
WBC # BLD AUTO: 3.6 10E9/L (ref 4–11)

## 2017-11-22 PROCEDURE — 99214 OFFICE O/P EST MOD 30 MIN: CPT | Performed by: FAMILY MEDICINE

## 2017-11-22 PROCEDURE — 80050 GENERAL HEALTH PANEL: CPT | Performed by: FAMILY MEDICINE

## 2017-11-22 PROCEDURE — 36415 COLL VENOUS BLD VENIPUNCTURE: CPT | Performed by: FAMILY MEDICINE

## 2017-11-22 PROCEDURE — 83036 HEMOGLOBIN GLYCOSYLATED A1C: CPT | Performed by: FAMILY MEDICINE

## 2017-11-22 RX ORDER — ATORVASTATIN CALCIUM 10 MG/1
10 TABLET, FILM COATED ORAL DAILY
Qty: 90 TABLET | Refills: 3 | Status: SHIPPED | OUTPATIENT
Start: 2017-11-22 | End: 2022-04-27

## 2017-11-22 NOTE — PROGRESS NOTES
11/17/17-  Received message from Alice at [facility]. She said due to staffing issues, they are unable to take Leona David as a new client.     Corrie ROTHMAN, [facility] Care Coordinator   Telephone: [phone]  Fax: [phone]    11/20/17-  Received call from Lamonte. She said her dad has had some changes; his head is tilted to the side, he is shaking more and needs help with eating as he can't keep food on the silverware and needs food cut up in small pieces. He continues to be paranoid; thinks people are out to get him or kill him and has visual hallucinations; sees someone in the backseat of the car that isn't there.   Lamonte said their family is trying to manage taking care of him and her mother; Lamonte's brother is going over there daily to help him but it is getting to be too much. The family is thinking her mom might go live at her brother's if her dad needs more care and can't live alone.     Thu wouldn't let his wife go to adult day care while Lamonte was in [place] for 3 weeks. Lamonte said she is taking her dad to see Dr Dahl on Wed about all of this. She will keep me posted. I also let her know about [facility] and discontinuing homemaking. I told her we can look at next steps after PCP visit.     Corrie ROTHMAN, [facility] Care Coordinator   Telephone: [phone]  Fax: [phone]

## 2017-11-22 NOTE — PROGRESS NOTES
SUBJECTIVE:   Leona David is a 86 year old male who presents to clinic today for the following health issues:      Rash  Onset: 1 month    Description:   Location:  neck  Character: raised  Itching (Pruritis): no     Progression of Symptoms:  worsening    Accompanying Signs & Symptoms:  Fever: no   Body aches or joint pain: YES  Sore throat symptoms: no   Recent cold symptoms: no     History:   Previous similar rash: no     Precipitating factors:   Exposure to similar rash: no   New exposures: None   Recent travel: no     Alleviating factors:  tilting    Therapies Tried and outcome: None    Pt is having illusion and cannot tilt his head straight.            Problem list and histories reviewed & adjusted, as indicated.  Additional history: as documented         Reviewed and updated as needed this visit by clinical staffTobacco  Allergies  Med Hx  Surg Hx  Fam Hx  Soc Hx      Reviewed and updated as needed this visit by Provider          took the h pylori meds, finished    No abd pain now      Head tends to bend to right    No pain    Tried icy hot on neck   The rash area seems to be getting better per patient        Exam:  Patient has fairly good range of motion     When getting papers out of large envelope, he had some tremor present    On exam he has occasional pill rolling type tremor    Had seen neurology about 10 years ago, unclear diagnosis    On back of neck patient has a few mildly red raised areas. A couple right side of neck but a few more on left side.  Almost like acne like lesions    Per daughter, patient is having weird thoughts    ASSESSMENT / PLAN:  (R25.1) Tremor  (primary encounter diagnosis)  Comment: prudent to see neurology   Plan: NEUROLOGY ADULT REFERRAL        Daughter will call and schedule; gave them various options on schedule    (R26.89) Shuffling gait  Comment: as above   Plan: NEUROLOGY ADULT REFERRAL        Suspicious for parkinsons    (R41.89) Cognitive changes  Comment: as above    Plan: NEUROLOGY ADULT REFERRAL            (E78.5) Hyperlipidemia with target LDL less than 130  Comment: patient can restart this   Plan: atorvastatin (LIPITOR) 10 MG tablet        Sent in prescription     (R14.0) Abdominal distension  Comment: ultrasound and labs   Plan: US Abdomen Complete             (R53.83) Fatigue, unspecified type  Comment: check   Plan: TSH with free T4 reflex, CBC with platelets         differential, Comprehensive metabolic panel             (R73.01) Impaired fasting glucose  Comment: check   Plan: Hemoglobin A1c             (R21) Rash  Comment: this is improving  Plan: monitor     (A04.8) H. pylori infection  Comment: patient finished prescriptions for this, no symptoms currently   Plan: as above.  No need to recheck for h pylori with no symptoms.      Blood pressure high today but just keep the hctz as is.  Monitor.      I reviewed the patient's medications, allergies, medical history, family history, and social history.    Arie Dahl MD      Heart and lungs okay    Only mild edema bilat    abd soft, perhaps mildly distended    Some liver problems in family     I had patient walk in beyer, some shuffling of gait;   Patient seems fairly steady    See above for a/p.  Arie Dahl MD

## 2017-11-22 NOTE — LETTER
[facility] [place]   [address] [place]  [address]  [phone]                                   November 24, 2017    Thu BOOM David  [address] [facility] [address]        Dear u,    The overall blood sugar test ( hemoglobin a1c ) is higher than last time.  Advise healthy diet/ increase exercise as you are able, and we can then recheck this in about 3 months.    The main priority is the neurology consult.  Call to schedule that.    Results for orders placed or performed in visit on 11/22/17   TSH with free T4 reflex   Result Value Ref Range    TSH 1.08 0.40 - 4.00 mU/L   CBC with platelets differential   Result Value Ref Range    WBC 3.6 (L) 4.0 - 11.0 10e9/L    RBC Count 4.91 4.4 - 5.9 10e12/L    Hemoglobin 15.8 13.3 - 17.7 g/dL    Hematocrit 44.7 40.0 - 53.0 %    MCV 91 78 - 100 fl    MCH 32.2 26.5 - 33.0 pg    MCHC 35.3 31.5 - 36.5 g/dL    RDW 12.4 10.0 - 15.0 %    Platelet Count 124 (L) 150 - 450 10e9/L    Diff Method Automated Method     % Neutrophils 58.1 %    % Lymphocytes 27.7 %    % Monocytes 8.9 %    % Eosinophils 4.5 %    % Basophils 0.8 %    Absolute Neutrophil 2.1 1.6 - 8.3 10e9/L    Absolute Lymphocytes 1.0 0.8 - 5.3 10e9/L    Absolute Monocytes 0.3 0.0 - 1.3 10e9/L    Absolute Eosinophils 0.2 0.0 - 0.7 10e9/L    Absolute Basophils 0.0 0.0 - 0.2 10e9/L   Comprehensive metabolic panel   Result Value Ref Range    Sodium 142 133 - 144 mmol/L    Potassium 3.6 3.4 - 5.3 mmol/L    Chloride 107 94 - 109 mmol/L    Carbon Dioxide 24 20 - 32 mmol/L    Anion Gap 11 3 - 14 mmol/L    Glucose 239 (H) 70 - 99 mg/dL    Urea Nitrogen 16 7 - 30 mg/dL    Creatinine 0.77 0.66 - 1.25 mg/dL    GFR Estimate >90 >60 mL/min/1.7m2    GFR Estimate If Black >90 >60 mL/min/1.7m2    Calcium 8.4 (L) 8.5 - 10.1 mg/dL    Bilirubin Total 0.3 0.2 - 1.3 mg/dL    Albumin 3.4 3.4 - 5.0 g/dL    Protein Total 7.4 6.8 - 8.8 g/dL    Alkaline Phosphatase 72 40 - 150 U/L    ALT 44 0 - 70  U/L    AST 32 0 - 45 U/L   Hemoglobin A1c   Result Value Ref Range    Hemoglobin A1C 8.0 (H) 4.3 - 6.0 %       If you have any questions please call the clinic at [phone]    Sincerely,    Arie Dahl MD    bmd

## 2017-11-22 NOTE — PATIENT INSTRUCTIONS
Stay on hydrochlorothiazide    Restart the lipitor ( atorvastatin)     We will schedule ultrasound of abdomen    We will send you lab results    Call to schedule consult with neurology - possible Parkinsons Disease

## 2017-11-22 NOTE — MR AVS SNAPSHOT
After Visit Summary   11/22/2017    Leona David    MRN: [id number]           Patient Information     Date Of Birth          10/20/1931        Visit Information        Provider Department      11/22/2017 1:30 PM Arie Dahl MD; [employer] [facility]        Today's Diagnoses     Tremor    -  1    Shuffling gait        Cognitive changes        Hyperlipidemia with target LDL less than 130        Abdominal distension        Fatigue, unspecified type        Impaired fasting glucose          Care Instructions    Stay on hydrochlorothiazide    Restart the lipitor ( atorvastatin)     We will schedule ultrasound of abdomen    We will send you lab results    Call to schedule consult with neurology - possible Parkinsons Disease            Follow-ups after your visit        Additional Services     NEUROLOGY ADULT REFERRAL       Your provider has referred you for the following:   Consult at [facility]: [facility] [place] [phone]   [email or web address]://[email or web address].[facility] [[email or web address]].[facility]/  [facility]: [facility] - [place] [phone]   [email or web address]://[email or web address].[facility] [[email or web address]].[email or web address]/Clinics/neurology-clinic/  General Neurology  [facility]: [facility] [place] [phone]   [email or web address]://www.[facility]/locations.html  [place] [phone]   [email or web address]://www.[email or web address]/locations.[email or web address]  [place] [phone]   http://www.[facility]/locations.html  [place] [phone]   http://www.[facility]/locations.html  [facility]: [facility] [phone]   [email or web address]://[email or web address]  [place] [phone]   http://www.[facility]  [place] [phone]   [email or web address]://[email or web address]    Please be aware that coverage of these services is subject to the terms and limitations of your health insurance plan.  Call member services at your health plan with any benefit or  coverage questions.      Please bring the following with you to your appointment:    (1) Any X-Rays, CTs or MRIs which have been performed.  Contact the facility where they were done to arrange for  prior to your scheduled appointment.    (2) List of current medications  (3) This referral request   (4) Any documents/labs given to you for this referral                  Your next 10 appointments already scheduled     Nov 24, 2017  8:15 AM CST   US ABDOMEN COMPLETE with [id number]    [address] [place] [id number] [phone]           Please bring a list of your medicines (including vitamins, minerals and over-the-counter drugs). Also, tell your doctor about any allergies you may have. Wear comfortable clothes and leave your valuables at home.  Adults: No eating or drinking for 8 hours before the exam. You may take medicine with a small sip of water.  Children: - Children 6+ years: No food or drink for 6 hours before exam. - Children 1-5 years: No food or drink for 4 hours before exam. - Infants, breast-fed: may have breast milk up to 2 hours before exam. - Infants, formula: may have bottle until 4 hours before exam.  Please call the Imaging Department at your exam site with any questions.              Future tests that were ordered for you today     Open Future Orders        Priority Expected Expires Ordered    US Abdomen Complete Routine  11/22/2018 11/22/2017            Who to contact     If you have questions or need follow up information about today's clinic visit or your schedule please contact [facility] directly at [phone].  Normal or non-critical lab and imaging results will be communicated to you by MyChart, letter or phone within 4 business days after the clinic has received the results. If you do not hear from us within 7 days, please contact the clinic through MyChart or phone. If you  "have a critical or abnormal lab result, we will notify you by phone as soon as possible.  Submit refill requests through [employer] or call your pharmacy and they will forward the refill request to us. Please allow 3 business days for your refill to be completed.          Additional Information About Your Visit        [employer] Information     [employer] lets you send messages to your doctor, view your test results, renew your prescriptions, schedule appointments and more. To sign up, go to [email or web address] . Click on \"Log in\" on the left side of the screen, which will take you to the Welcome page. Then click on \"Sign up Now\" on the right side of the page.     You will be asked to enter the access code listed below, as well as some personal information. Please follow the directions to create your username and password.     Your access code is: [email or web address]  Expires: 2018  2:54 PM     Your access code will  in 90 days. If you need help or a new code, please call your [facility] clinic or [phone].        Care EveryWhere ID     This is your Care EveryWhere ID. This could be used by other organizations to access your [facility] medical records  [id number]        Your Vitals Were     Pulse Temperature Height BMI (Body Mass Index)          97 97  F (36.1  C) (Oral) 5' 6.3\" (1.684 m) 23.32 kg/m2         Blood Pressure from Last 3 Encounters:   17 152/82   10/25/17 123/74   17 138/76    Weight from Last 3 Encounters:   17 145 lb 12.8 oz (66.1 kg)   10/25/17 149 lb (67.6 kg)   17 145 lb (65.8 kg)              We Performed the Following     CBC with platelets differential     Comprehensive metabolic panel     Hemoglobin A1c     NEUROLOGY ADULT REFERRAL     TSH with free T4 reflex          Where to get your medicines      These medications were sent to [facility] [address] [place] [address]     Phone:  [phone]     " atorvastatin 10 MG tablet          Primary Care Provider Office Phone # Fax #    Arie Dahl [phone]       [address] [facility] [address]        Equal Access to Services     REHAN PANDYA : Hadii santos lott antonioo Socarlyali, waaxda luqadaha, qaybta kaalmada fito, virgilio tran laBabarclinton [place]. So [facility] [phone].    ATENCIÓN: Si habla español, tiene a hernandez disposición servicios gratuitos de asistencia lingüística. Llame al [phone].    We comply with applicable federal civil rights laws and [place] laws. We do not discriminate on the basis of race, color, national origin, age, disability, sex, sexual orientation, or gender identity.            Thank you!     Thank you for choosing [facility]  for your care. Our goal is always to provide you with excellent care. Hearing back from our patients is one way we can continue to improve our services. Please take a few minutes to complete the written survey that you may receive in the mail after your visit with us. Thank you!             Your Updated Medication List - Protect others around you: Learn how to safely use, store and throw away your medicines at www.disposemymeds.org.          This list is accurate as of: 11/22/17  2:17 PM.  Always use your most recent med list.                   Brand Name Dispense Instructions for use Diagnosis    aspirin 81 MG EC tablet      1 TABLET DAILY        atorvastatin 10 MG tablet    LIPITOR    90 tablet    Take 1 tablet (10 mg) by mouth daily    Hyperlipidemia with target LDL less than 130       CALCIUM 600/VITAMIN D3 PO      Take 600 mg by mouth daily        cyanocolbalamin 100 MCG tablet    vitamin  B-12     Take 50 mcg by mouth daily        Fish Oil 645 MG Caps      Take 645 mg by mouth daily        hydrochlorothiazide 12.5 MG Tabs tablet     90 tablet    TAKE ONE TABLET BY MOUTH ONE TIME DAILY    Essential hypertension with goal blood pressure less  than 140/90       MULTIVITAMIN PO      Take by mouth daily        sodium chloride 5 % ophthalmic ointment    EMILIO 128     1 Application At Bedtime        vitamin  B-1 250 MG Tabs      Take 250 mg by mouth daily

## 2017-11-24 ENCOUNTER — RADIANT APPOINTMENT (OUTPATIENT)
Dept: ULTRASOUND IMAGING | Facility: CLINIC | Age: 82
End: 2017-11-24
Attending: FAMILY MEDICINE
Payer: COMMERCIAL

## 2017-11-24 DIAGNOSIS — R14.0 ABDOMINAL DISTENSION: ICD-10-CM

## 2017-11-24 PROCEDURE — 76700 US EXAM ABDOM COMPLETE: CPT

## 2017-11-24 NOTE — PROGRESS NOTES
The overall blood sugar test ( hemoglobin a1c ) is higher than last time.  Advise healthy diet/ increase exercise as you are able, and we can then recheck this in about 3 months.    The main priority is the neurology consult.  Call to schedule that.    Arie Dahl MD

## 2017-11-24 NOTE — LETTER
[facility]  [address] [place]  [address]  [phone]        November 27, 2017    Thu X Geo  [address] [place] [address]        Dear Thu,    There is a fatty liver but this is fairly common.  No obvious extra fluid in abdomen.     See neurology as we discussed.   Results for orders placed or performed in visit on 11/24/17   US Abdomen Complete    Narrative    ULTRASOUND ABDOMEN COMPLETE 11/24/2017 8:24 AM    HISTORY:  86-year-old patient with abdominal distention.    COMPARISON: None.    FINDINGS: The visualized portions of the pancreas are unremarkable.  Coarsened echotexture throughout the liver. No focal liver lesion.  Liver size is 14.3 cm. The proximal abdominal aorta is 2.4 cm, mid  abdominal aorta 1.6 cm, and distal abdominal aorta 1.4 cm. Visualized  IVC is unremarkable. Gallbladder appears normal without  cholelithiasis. Negative for pericholecystic fluid. Common bile duct  is up to 7.2 mm. The right kidney is 9.4 x 6.6 x 5.3 cm with AP  cortical thickness of 1.2 cm. The left kidney is 10.9 x 5 x 4.6 cm  with AP cortical thickness of 0.8 cm. Negative for hydronephrosis  bilaterally. The spleen is 10.6 cm in length.      Impression    IMPRESSION:  1. Fatty infiltration throughout the liver.  2. Exam is otherwise unremarkable.    WYATT LI MD   If you have any questions please call the clinic at [phone].    Sincerely,    Arie Dahl MD  [facility]

## 2017-11-25 NOTE — PROGRESS NOTES
There is a fatty liver but this is fairly common.  No obvious extra fluid in abdomen.    See neurology as we discussed.    Arie Dahl MD

## 2017-11-30 ENCOUNTER — CARE COORDINATION (OUTPATIENT)
Dept: GERIATRIC MEDICINE | Facility: CLINIC | Age: 82
End: 2017-11-30

## 2017-12-28 ENCOUNTER — TELEPHONE (OUTPATIENT)
Dept: AUDIOLOGY | Facility: CLINIC | Age: 82
End: 2017-12-28
Payer: COMMERCIAL

## 2017-12-28 ENCOUNTER — TRANSFERRED RECORDS (OUTPATIENT)
Dept: HEALTH INFORMATION MANAGEMENT | Facility: CLINIC | Age: 82
End: 2017-12-28

## 2017-12-28 ENCOUNTER — TELEPHONE (OUTPATIENT)
Dept: FAMILY MEDICINE | Facility: CLINIC | Age: 82
End: 2017-12-28

## 2017-12-28 DIAGNOSIS — H90.3 SENSORINEURAL HEARING LOSS, BILATERAL: Primary | ICD-10-CM

## 2017-12-28 PROCEDURE — V5266 BATTERY FOR HEARING DEVICE: HCPCS | Performed by: AUDIOLOGIST

## 2017-12-28 NOTE — TELEPHONE ENCOUNTER
Reason for Call:  Same Day Appointment, Requested Provider:  Arie Dahl MD    PCP: Arie Dahl    Reason for visit: follow neurologist, wanting to start some suggestions form him.    Additional comments: Please call to advise if you can see patient tomorrow. (Friday)  Daughter wants patient to see you sooner than next week.    Can we leave a detailed message on this number? YES    Phone number patient can be reached at:   FrankieLamonte Edwards ) [phone] (H)         Best Time: anytime    Call taken on 12/28/2017 at 4:10 PM by Theodora Sabillon

## 2017-12-28 NOTE — TELEPHONE ENCOUNTER
Attempt # 1  Called patient at home number.  Was call answered?  Yes, daughter states patient has Lewy body dementia - needs PT for neck due to about a month ago neck became crooked.  Patient would really like to see Dr. Dahl on Friday if at all possible.      Teresita Daniel RN  [facility]

## 2017-12-28 NOTE — TELEPHONE ENCOUNTER
Patient's daughter calls to request his 3 month supply of batteries be mailed to his home address. Mailed 24 size 312 batteries to the address on file.     CHARGES:   .002 x24 Batteries ($1). Total: $24 Bill to patient's insurance.    Umair Krueger CCC-A  Licensed Audiologist #[id number]  12/28/2017

## 2017-12-28 NOTE — TELEPHONE ENCOUNTER
Reason for Call:  Other call back    Detailed comments: Patient calling for hearing aid batteries. Please mail and call. Thank you.     Phone Number Patient can be reached at: Cell number on file:    Telephone Information:    [phone]      Can call anytime  Can we leave a detailed message on this number? YES    Call taken on 12/28/2017 at 3:13 PM by Kizzy Mccall

## 2017-12-29 ENCOUNTER — OFFICE VISIT (OUTPATIENT)
Dept: FAMILY MEDICINE | Facility: CLINIC | Age: 82
End: 2017-12-29
Payer: COMMERCIAL

## 2017-12-29 VITALS
WEIGHT: 146 LBS | DIASTOLIC BLOOD PRESSURE: 69 MMHG | BODY MASS INDEX: 23.35 KG/M2 | HEART RATE: 92 BPM | TEMPERATURE: 97.9 F | SYSTOLIC BLOOD PRESSURE: 140 MMHG | OXYGEN SATURATION: 97 %

## 2017-12-29 DIAGNOSIS — F02.818 DEMENTIA DUE TO MEDICAL CONDITION WITH BEHAVIORAL DISTURBANCE (H): Primary | ICD-10-CM

## 2017-12-29 PROCEDURE — 99214 OFFICE O/P EST MOD 30 MIN: CPT | Performed by: FAMILY MEDICINE

## 2017-12-29 NOTE — PROGRESS NOTES
SUBJECTIVE:   Leona David is a 86 year old male who presents to clinic today for the following health issues:      Patient is here to follow up his neurologist appointment.  Pt would also like a new hearing aid as his is out of batteries.    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  They have been getting help 2-3 times a week for working with cleaning the house but get no other cares at this point.  They do have a  working with him I believe.  Patient is hard of hearing and currently does not have batteries for his hearing aid.  They do not speak English and the patient in particular in addition to having a language barrier because of speaking [place] also has dementia.  Patient was seen by a neurologist yesterday at around clinic.  The neurologist ordered some Aricept and another medication for movement disorder, but this is not available at this point.  The neurologist requested that his primary care physician write for these prescriptions, but if uncomfortable doing so then she would agree to write the prescriptions for him.    The living situation is probably not ideal at this point.  The daughter thinks that the wife will not be able to take care of this patient as he ages.  There is another daughter that lives in town but she has some sort of mental health disorder and the daughter does not think she will be capable of making major decisions for the family.  The daughter from [place] wants to stay involved in care.  This patient normally sees 1 of her other providers, Dr. raymond.  They are aware of his conditions.    Past Medical History:   Diagnosis Date     Hypertension        Past Surgical  History:   Procedure Laterality Date     APPENDECTOMY         History reviewed. No pertinent family history.    Social History   Substance Use Topics     Smoking status: Former Smoker     Quit date: 9/3/1994     Smokeless tobacco: Never Used     Alcohol use Yes      Comment: wine       Current Outpatient Prescriptions   Medication Sig Dispense Refill     order for DME Equipment being ordered: Wheelchair  Patient should have a 4 wheel walker with a chair and brakes 1 each 0     atorvastatin (LIPITOR) 10 MG tablet Take 1 tablet (10 mg) by mouth daily 90 tablet 3     hydrochlorothiazide 12.5 MG TABS tablet TAKE ONE TABLET BY MOUTH ONE TIME DAILY  90 tablet 2     sodium chloride (EMILIO 128) 5 % ophthalmic ointment 1 Application At Bedtime       Omega-3 Fatty Acids (FISH OIL) 645 MG CAPS Take 645 mg by mouth daily       Thiamine HCl (VITAMIN  B-1) 250 MG TABS Take 250 mg by mouth daily       Calcium Carb-Cholecalciferol (CALCIUM 600/VITAMIN D3 PO) Take 600 mg by mouth daily       Multiple Vitamins-Minerals (MULTIVITAMIN OR) Take by mouth daily       cyanocolbalamin (VITAMIN  B-12) 100 MCG tablet Take 50 mcg by mouth daily       ASPIRIN 81 MG PO TBEC 1 TABLET DAILY       Ros: no chest pain, chest tightness   No sob   No leg edema   No abdominal pain     Denies fever or chills   Weight stable       O; /69 (BP Location: Left arm, Patient Position: Chair, Cuff Size: Adult Regular)  Pulse 92  Temp 97.9  F (36.6  C) (Oral)  Wt 146 lb (66.2 kg)  SpO2 97%  BMI 23.35 kg/m2    Chest wall normal to inspection and palpation. Good excursion bilaterally. Lungs clear to auscultation. Good air movement bilaterally without rales, wheezes, or rhonchi.   Regular rate and  rhythm. S1 and S2 normal, no murmurs, clicks, gallops or rubs. No edema or JVD.      ICD-10-CM    1. 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  The daughter was told she could probably  a set with private pay through her regular pharmacy [facility].  I will contact him around clinic to find out exactly what medications they are recommending.  The patient is being diagnosed with Lewy body dementia.  L the daughter states he does have visual hallucinations.    Follow-up was arranged for 2 weeks with his primary care provider.  He had a DME prescription written for a walker.  He has a risk of falls and that is why he needs this.  The daughter feels he could use the controls to manipulate stopping the walker.  He probably needs a seat so he can rest.  I did talk about consideration of moving into a senior high rise that would connect with an assisted living and then potentially be connected to a memory care unit.  They are not ready for that decision at this point.    Approximately 1 hour was spent with this patient.  Greater than 50% was counseling  Reviewed and updated as needed this visit by clinical staff  Tobacco  Allergies  Meds  Med Hx  Surg Hx  Fam Hx  Soc Hx      Reviewed and updated as needed this visit by Provider     I worked with an  and the daughter who is an RN from [place]

## 2017-12-29 NOTE — NURSING NOTE
"Chief Complaint   Patient presents with     RECHECK     f/u neurology       Initial /69 (BP Location: Left arm, Patient Position: Chair, Cuff Size: Adult Regular)  Pulse 92  Temp 97.9  F (36.6  C) (Oral)  Wt 146 lb (66.2 kg)  SpO2 97%  BMI 23.35 kg/m2 Estimated body mass index is 23.35 kg/(m^2) as calculated from the following:    Height as of 11/22/17: 5' 6.3\" (1.684 m).    Weight as of this encounter: 146 lb (66.2 kg).  Medication Reconciliation: complete   Josefina See DORITA Mcpherson    "

## 2017-12-29 NOTE — MR AVS SNAPSHOT
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  Home Care or Hospice will then contact the patient to schedule their appointment.**    If you do not hear from Home Care and Hospice, or you would like to call to schedule, please call the referring place of service that your provider has listed below.  ______________________________________________________________________    Your provider has referred you to: [facility]: [facility] [phone]   [email or web address]    Extended Emergency Contact Information  Primary Emergency Contact: GEO ANDREWS  Home Phone: [phone]  Relation: Son  Secondary Emergency Contact: Geo-NashorLamonte   [facility]  Home Phone: [phone]  Work Phone: [phone]  Relation: Daughter    Patient Anticipated Discharge Date: patient not inpatient    RN, PT, HHA to begin 24 - 48 hours after discharge.  PLEASE EVALUATE AND TREAT (Evaluation timeline is 24 - 48 hrs. Please call if there is need for a variance to this timeline).    REASON FOR REFERRAL: Assessment & Treatment: PT, RN and ot to be determined by RN     ADDITIONAL SERVICES NEEDED: SW    OTHER PERTINENT INFORMATION: Patient was last seen by provider on 12/29/2017 for movement disorder .    Current Outpatient Prescriptions:  order for DME, Equipment being ordered: Wheelchair  Patient should have a 4 wheel walker with a chair  and brakes, Disp: 1 each, Rfl: 0  atorvastatin (LIPITOR) 10 MG tablet, Take 1 tablet (10 mg) by mouth daily, Disp: 90 tablet, Rfl: 3  hydrochlorothiazide 12.5 MG TABS tablet, TAKE ONE TABLET BY MOUTH ONE TIME DAILY , Disp: 90 tablet, Rfl: 2  sodium chloride (EMILIO 128) 5 % ophthalmic ointment, 1 Application At Bedtime, Disp: , Rfl:   Omega-3 Fatty Acids (FISH OIL) 645 MG CAPS, Take 645 mg by mouth daily, Disp: , Rfl:   Thiamine HCl (VITAMIN  B-1) 250 MG TABS, Take 250 mg by mouth daily, Disp: , Rfl:   Calcium Carb-Cholecalciferol (CALCIUM 600/VITAMIN D3 PO), Take 600 mg by mouth daily, Disp: , Rfl:   Multiple Vitamins-Minerals (MULTIVITAMIN OR), Take by mouth daily, Disp: , Rfl:   cyanocolbalamin (VITAMIN  B-12) 100 MCG tablet, Take 50 mcg by mouth daily, Disp: , Rfl:   ASPIRIN 81 MG PO TBEC, 1 TABLET DAILY, Disp: , Rfl:       Patient Active Problem List:     Hyperlipidemia with target LDL less than 130     Hypertension goal BP (blood pressure) < 140/90     Advanced directives, counseling/discussion     Health Care Home     Tremor      Documentation of Face to Face and Certification for Home Health Services    I certify that patientLeona is under my care and that I, or a Nurse Practitioner or Physician's Assistant working with me, had a face-to-face encounter that meets the physician face-to-face encounter requirements with this patient on: December 29, 2017  .    This encounter with the patient was in whole, or in part, for the following medical condition, which is the primary reason for Home Health Care: .    I certify that, based on my findings, the following services are medically necessary Home Health Services: Nursing, Occupational Therapy and Physical Therapy    My clinical findings support the need for the above services because: Nurse is needed: To assess movement disorders  after changes in medications or other medical regimen., To provide assessment and oversight required in the home to assure  adherence to the medical plan due to: dementia ., To provide caregiver training to assist with: use of walker and risk for falls . and To teach and train about the disease and treatments for movement disorder and dementia  illness, because not capable of understanding on his own.., Occupational Therapy Services are needed to assess and treat cognitive ability and address ADL safety due to impairment in memory and language . and Physical Therapy Services are needed to assess and treat the following functional impairments: movement .    Further, I certify that my clinical findings support that this patient is homebound (i.e. absences from home require considerable and taxing effort and are for medical reasons or [employer]ab[employer] services or infrequently or of short duration when for other reasons) because: Leaving home is medically contraindicated for the following reason(s): dementia   .    Based on the above findings, I certify that this patient is confined to the home and needs intermittent skilled nursing care, physical therapy and/or speech therapy.  The patient is under my care, and I have initiated the establishment of the plan of care.  This patient will be followed by a physician who will periodically review the plan of care.    Physician/Provider to provide follow up care: Arie Dahl    Responsible [place] certified Physician at time of discharge: memory issues     Please be aware that coverage of these services is subject to the terms and limitations of your health insurance plan.  Call member services at your health plan with any benefit or coverage questions.                  Your next 10 appointments already scheduled     Jan 12, 2018  2:20 PM CST   Office Visit with Arie Dahl MD   [facility]    [address] [place] [id number] [phone]           Bring a current list of meds and any records pertaining to  "this visit. For Physicals, please bring immunization records and any forms needing to be filled out. Please arrive 10 minutes early to complete paperwork.              Who to contact     If you have questions or need follow up information about today's clinic visit or your schedule please contact [facility] directly at [phone].  Normal or non-critical lab and imaging results will be communicated to you by MyChart, letter or phone within 4 business days after the clinic has received the results. If you do not hear from us within 7 days, please contact the clinic through Growhart or phone. If you have a critical or abnormal lab result, we will notify you by phone as soon as possible.  Submit refill requests through Insync or call your pharmacy and they will forward the refill request to us. Please allow 3 business days for your refill to be completed.          Additional Information About Your Visit        MyChart Information     Insync lets you send messages to your doctor, view your test results, renew your prescriptions, schedule appointments and more. To sign up, go to [email or web address] . Click on \"Log in\" on the left side of the screen, which will take you to the Welcome page. Then click on \"Sign up Now\" on the right side of the page.     You will be asked to enter the access code listed below, as well as some personal information. Please follow the directions to create your username and password.     Your access code is: [email or web address]  Expires: 2018  2:54 PM     Your access code will  in 90 days. If you need help or a new code, please call your [facility] or [phone].        Care EveryWhere ID     This is your Care EveryWhere ID. This could be used by other organizations to access your [facility] medical records  [id number]        Your Vitals Were     Pulse Temperature Pulse Oximetry BMI (Body Mass Index)          92 97.9  F (36.6  C) (Oral) 97% 23.35 kg/m2   "       Blood Pressure from Last 3 Encounters:   12/29/17 140/69   11/22/17 152/82   10/25/17 123/74    Weight from Last 3 Encounters:   12/29/17 146 lb (66.2 kg)   11/22/17 145 lb 12.8 oz (66.1 kg)   10/25/17 149 lb (67.6 kg)              We Performed the Following     HOME CARE NURSING REFERRAL          Today's Medication Changes          These changes are accurate as of: 12/29/17  8:59 AM.  If you have any questions, ask your nurse or doctor.               Start taking these medicines.        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  Ask your nurse if you have questions.     Bring a paper prescription for each of these medications     order for DME                Primary Care Provider Office Phone # Fax #    Arie Dahl [phone]       [address] [facility] [address]        Equal Access to Services     [facility]: Hadii aad ku hadasho Soomaali, waaxda luqadaha, qaybta kaalmada adeegyada, virgilio flores hayaletan [place]. So [facility] [phone].    ATENCIÓN: Si habla español, tiene a hernandez disposición servicios gratuitos de asistencia lingüística. Llame al [phone].    We comply with applicable federal civil rights laws and [place] laws. We do not discriminate on the basis of race, color, national origin, age, disability, sex, sexual orientation, or gender identity.            Thank you!     Thank you for choosing [facility]  for your care. Our goal is always to provide you with excellent care. Hearing back from our patients is one way we can continue to improve our  services. Please take a few minutes to complete the written survey that you may receive in the mail after your visit with us. Thank you!             Your Updated Medication List - Protect others around you: Learn how to safely use, store and throw away your medicines at www.disposemymeds.org.          This list is accurate as of: 12/29/17  8:59 AM.  Always use your most recent med list.                   Brand Name Dispense Instructions for use Diagnosis    aspirin 81 MG EC tablet      1 TABLET DAILY        atorvastatin 10 MG tablet    LIPITOR    90 tablet    Take 1 tablet (10 mg) by mouth daily    Hyperlipidemia with target LDL less than 130       CALCIUM 600/VITAMIN D3 PO      Take 600 mg by mouth daily        cyanocolbalamin 100 MCG tablet    vitamin  B-12     Take 50 mcg by mouth daily        Fish Oil 645 MG Caps      Take 645 mg by mouth daily        hydrochlorothiazide 12.5 MG Tabs tablet     90 tablet    TAKE ONE TABLET BY MOUTH ONE TIME DAILY    Essential hypertension with goal blood pressure less than 140/90       MULTIVITAMIN PO      Take by mouth daily        order for DME     1 each    Equipment being ordered: Wheelchair Patient should have a 4 wheel walker with a chair and brakes    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

## 2018-01-02 ENCOUNTER — CARE COORDINATION (OUTPATIENT)
Dept: GERIATRIC MEDICINE | Facility: CLINIC | Age: 83
End: 2018-01-02

## 2018-01-03 ENCOUNTER — TELEPHONE (OUTPATIENT)
Dept: FAMILY MEDICINE | Facility: CLINIC | Age: 83
End: 2018-01-03

## 2018-01-03 ENCOUNTER — CARE COORDINATION (OUTPATIENT)
Dept: GERIATRIC MEDICINE | Facility: CLINIC | Age: 83
End: 2018-01-03

## 2018-01-03 NOTE — TELEPHONE ENCOUNTER
Reason for Call: Request for an order or referral:    Order or referral being requested: Skilled RN 2 times a week for 2 weeks. Then 1 time a week for 7 weeks and 3 PRN.   Home health aid 1 time a week for 9 weeks. OT & PT one time to eval and treat.  1 time to assists with community resources.    Date needed: as soon as possible    Has the patient been seen by the PCP for this problem? Not Applicable    Additional comments: ok to leave voice message with orders    Phone number Patient can be reached at:    [facility] [phone]         Best Time:  anytime    Can we leave a detailed message on this number?  YES    Call taken on 1/3/2018 at 10:02 AM by Theodora Sabillon

## 2018-01-03 NOTE — TELEPHONE ENCOUNTER
Called the number below for Zippy.    The voicemail stated that the mailbox is full. Unable to leave a message at this time.    Dyan Szymanski RN CPC Triage.

## 2018-01-04 ENCOUNTER — DOCUMENTATION ONLY (OUTPATIENT)
Dept: CARE COORDINATION | Facility: CLINIC | Age: 83
End: 2018-01-04

## 2018-01-05 NOTE — PROGRESS NOTES
[facility] now requests orders and shares plan of care/discharge summaries for some patients through Efizity.  Please REPLY TO THIS MESSAGE in order to give authorization for orders when needed.  This is considered a verbal order, you will still receive a faxed copy of orders for signature.  Thank you for your assistance in improving collaboration for our patients.    ORDER  PT is requesting continued PT orders  2 week 4  For gait and transfer training and exercises  Myofascial release and heating pad for cervical pain.     MD SUMMARY/PLAN OF CARE    Patient is an 86 year old male living in a senior apartment with his elderly wife. Patient has Lewy body dementia and has progressively gotten worse with time.  Patient also has bad tremors making it difficult to take care of himself or complete his ADLs.  Patient is hard of haring using Hearing aides which family says is hard for patient to manage with his dementia.    Reports a 7/10 pain to the shoulders and upper back including the neck area. Patient was observed to have poor posture when sitting and walking.  Patient would benefit from further homecare PT visits 2 week 4  for gait and transfer training and exericses and massage and myofascial release to upper back and neck.

## 2018-01-07 NOTE — PROGRESS NOTES
1/2/18-  Received call from Lamonte, daughter. She said her dad saw the neurologist and was given a diagnosis of Lewy Body Dementia. He was started on Aricept and some other medication she couldn't remember. Lamonte would like me to come and reevaluate her dad as he is needing more help at home. She said her brother is in a hurry to go back to [place] so won't be able to be the homemaker like we discussed before. Lamonte said her sister is here from [place] who got on the phone next.  Nereida is a nurse and went to her dad's appt with him with Dr CHUN. Nereida said [facility] was ordered (PT/OT, nursing, SW, HHA)and is starting this week. Nereida talked about how her dad's head is tilted to the side so he walks unbalanced and at risk of falling. He needs a walker which I can order.  They are waiting for results of head MRI or CT (don't recall which).    I will see u tomorrow for change of condition assessment and PCA assessment. Nereida leaves tomorrow evening to go back to [place] and would like to be at the assessment.    Nereida requested HCD and POA documents which I will bring with me. I have mailed Lamonte the HCD a couple of times but she reported never getting them.    Corrie ROTHMAN, [facility] Care Coordinator   Telephone: [phone]  Fax: [phone]

## 2018-01-07 NOTE — PROGRESS NOTES
[facility] Home Visit Assessment     Home visit for Health Risk Assessment with Leona David completed on January 3, 2018  Member resides in Apartment not handicap accessible  no stairs and lives with spouse  Present at assessment: Thu, his wife; Lillian and daughter Nereida and her  and daughter. Nereida interpreted per Thu's wishes. Waiver signed.    Current Care Plan  Member currently receiving the following services: Adult Day Care, Homemaking and EW transportation. At this time Thu is not going to [facility] nor are they receiving homemaking services. We were in the process of Thu's son getting hired by a home care agency to become their homemaker. This has changed and the son is no longer available to do this.     Medication Review  Medication reconciliation completed in Epic:No, I started to do the med rec in the home but was told the nurse is coming on Friday to manage his meds. I could not tell which eye drops he is taking or if he is taking them correctly. I will talk to the nurse regarding the MD-2 machine and getting a lock box for the other meds so he can't mess with them.   Medication set-up & administration: Thu reported setting up his meds himself in pill boxes and taking them himself. I tried getting a MD-2 machine and home care nurse in Sept but he refused this attempt.    Medication understanding concerns (by member, family or CC): Yes: by CC and family.  Medication adherence concerns (by member, family or CC): Yes: by CC and family.    Mental/Behavioral Health   Depression Screening: See PHQ assessment flowsheet.   Mental Health Diagnosis: No    Falls in last 12 months: No    ADL/IADL Dependencies: Ambulation-cane, Bathing, Dressing, Grooming, Eating, Toileting, Cleaning, Cooking, Laundry, Shopping, Meal prep, Medication Management, Money Management and Transportation     [facility] sponsored benefits: Shared information re: Silver Sneakers/gym memberships, ASA, Calcium +D.    PCA Assessment  completed at visit: Yes, Thu is eligible for 5.75 hrs/day.    Elderly Waiver Eligibility: Yes-will continue on EW    Care Plan & Recommendations: OT will be out tomorrow and nurse comes Friday. Aburto will be present and will have staff get in touch with me after visits. I will begin looking for PCA, see if there's a  provider for Citizen of [place] MOW, and order MD-2 machine. Nereida and I also discussed assisted living which she would like me to check into at the [facility] site. She said they know a couple that works there (good family friends who speak [place] of [place])he's a cook and she coordinates activities and would be able to keep an eye on her parents. Nereida said she and her sibs would be comfortable having them live there.   I explained there is probably a waiting list and they would need to accept EW. She will inquire into this.    See [facility] for detailed assessment information.    Follow-Up Plan: Member informed of future contact, plan to f/u with member with a 6 month telephone assessment.  Contact information shared with member and family, encouraged member to call with any questions or concerns at any time.  [facility] continuum providers: Please refer to Health Care Home on the Epic Problem List to view this patient's [facility] Care Plan Summary.    Corrie ROTHMAN, [facility] Care Coordinator   Telephone: [phone]  Fax: [phone]    1/3/18-  Received message from Froilan Mason with [facility]. She received referral to see Thu but wondered if she needs to go out as she knows I am involved.     I called Isabella back [phone] and LM that I didn't think she needed to see him as I can provide resources to the family.    Corrie ROTHMAN, [facility] Care Coordinator   Telephone: [phone]  Fax: [phone]    1/4/18-  Received call from GABRIELLA Marx with  HC. She asked if I could order a bed railing for Thu's bed to help him get up easier. I will do this.  We also discussed starting a HHA soon until I can find a PCA. Thu would like a male. Araseli will let the nurse know.   PT will also start but she didn't know when for sure.    Received email from Nereida:  Arian Denton,     Here is the link with info at the Assisted Living - [facility] where I want my parents to be on the waiting list.  We have very closed family friends (My parents know them very well and they love my parents) who work there daily.  It appears that this place takes Elderly waiver.  I am not sure about the detail and knowing that the waiting list is very long, hope we can get parents in when they are ready.  Please proceed and let me know what I need to do to enlist them.  Thanks so much for your help:)     [email or web address]     Nereida    I replied to Nereida:  You can go ahead and call them and put your parents on the waiting list. Both are on the elderly waiver. Staff usually like to talk to family as you know your parents best.   If they want to talk to me just give them my name and number.      FYI- I spoke with the OT today and she requested a bed railing which I will order.     Thank you,  oCrrie Padron replied:  Awesome!  I will contact them very soon and let you know:)  Nereida Espino  [facility] Care Coordinator   Telephone: [phone]  Fax: [phone]    1/5/18-  Called 7  home care companies to inquire about making a referral for PCA.   Made PCA referral to [facility] and [facility]. [facility] requires nursing to come through their agency if they find staff for the PCA.     Received call from Kenna Ríos)RN from [facility]. She was at [facility] and agrees he should have a MD-2 machine and lock box. I will order.   Called  [employer] and ordered the med machine. They will coordinate install with Kenna.    Called [employer] to see if there were any providers who provide  "Hungarian meals. Was told no, not specifically, only have \" type\"option.  Called Hungarian  and .      Corrie ROTHMAN, [facility] Coordinator   Telephone: [phone]  Fax: [phone]  "

## 2018-01-08 ENCOUNTER — CARE COORDINATION (OUTPATIENT)
Dept: GERIATRIC MEDICINE | Facility: CLINIC | Age: 83
End: 2018-01-08

## 2018-01-09 ENCOUNTER — CARE COORDINATION (OUTPATIENT)
Dept: GERIATRIC MEDICINE | Facility: CLINIC | Age: 83
End: 2018-01-09

## 2018-01-09 NOTE — PROGRESS NOTES
[facility]:  [facility].  Faxed copy of PCA assessment to PCA Agency and mailed copy to member.  Faxed MD Communication to PCP.     Savanna Mckinney  Case Management Specialist  [facility]  [phone]

## 2018-01-09 NOTE — PROGRESS NOTES
1/9/18-  Called [facility] and ordered a 4 ww, bed railing and lock box.    Received message from [facility] that they do not have staff in [place].     Corrie ROTHMAN, [facility] Care Coordinator   Telephone: [phone]  Fax: [phone]

## 2018-01-09 NOTE — PROGRESS NOTES
Received a request to submit a DTR for the termination of adult day care with transportation. Documentation completed and faxed to the health plan.  aware.    Sangeeta Ojeda RN  Utilization   [facility]  [phone]

## 2018-01-09 NOTE — PROGRESS NOTES
1/8/18-  Received call back from Natasha at [facility]. She said they do not provide meals there but referred me to Marcial for resources.    Called Marcial. He thinks [facility] might have choices.  Looked on JAYLEN ramirez. They do not have Malaysian meals.     Called Lamonte, daughter. Asked if Khmer meals would be an option. She said no, her parents do not eat Green[place]ic food very often. I told her I am unable to find an agency that would deliver Urdu food. Right now she brings food over for them and her mom can make simple meals. She knows their nutrition is not the best but it's the best she can do.    We discussed the assisted living and she does not agree with her sister about them going there. She said her mom does not eat american food and can not speak english so she will not do well there. Her dad might have to go alone but he will not go anywhere without her.   I asked if she's heard from [facility]. She has and someone is suppose to be calling me.  I let Lamonte know that I will be discontinuing ADC as Thu is not going right now. Told her I can always start it again.    I called Vanessa, physical therapist [facility]. She thinks a 4 ww will be fine so I will order.     Corrie ROTHMAN, [facility] Care Coordinator   Telephone: [phone]  Fax: [phone]

## 2018-01-10 ENCOUNTER — CARE COORDINATION (OUTPATIENT)
Dept: GERIATRIC MEDICINE | Facility: CLINIC | Age: 83
End: 2018-01-10

## 2018-01-10 ENCOUNTER — TELEPHONE (OUTPATIENT)
Dept: FAMILY MEDICINE | Facility: CLINIC | Age: 83
End: 2018-01-10

## 2018-01-10 NOTE — PROGRESS NOTES
1/10/18-  Received message from INES Mendez. She would like to have Fredykenton try a 4 ww before I order one. She will bring one to him on Friday and let me know. He will probably need a petite size.    Called [facility] and spoke with Jonathan. Asked her to have Andreia put the order on hold until I call her back and let her know what to order.    Corrie ROTHMAN, [facility] Care Coordinator   Telephone: [phone]  Fax: [phone]

## 2018-01-10 NOTE — TELEPHONE ENCOUNTER
Forms received from: [employer]   Phone number listed: [phone]   Fax listed: [phone]  Date received: 1-10-18  Form description: [place]  Once forms are completed, please return to Andreia via fax.  Is patient requesting to be contacted when forms are completed: n/a  Form placed: provider desk  Isatu Garcia

## 2018-01-11 ENCOUNTER — CARE COORDINATION (OUTPATIENT)
Dept: GERIATRIC MEDICINE | Facility: CLINIC | Age: 83
End: 2018-01-11

## 2018-01-11 NOTE — PROGRESS NOTES
1/11/18-  Called [facility], [phone] and  for staff to call me back regarding a PCA for Thu.    Corrie ROTHMAN, [facility] Care Coordinator   Telephone: [phone]  Fax: [phone]

## 2018-01-12 ENCOUNTER — TELEPHONE (OUTPATIENT)
Dept: FAMILY MEDICINE | Facility: CLINIC | Age: 83
End: 2018-01-12

## 2018-01-12 ENCOUNTER — CARE COORDINATION (OUTPATIENT)
Dept: GERIATRIC MEDICINE | Facility: CLINIC | Age: 83
End: 2018-01-12

## 2018-01-12 ENCOUNTER — OFFICE VISIT (OUTPATIENT)
Dept: FAMILY MEDICINE | Facility: CLINIC | Age: 83
End: 2018-01-12
Payer: COMMERCIAL

## 2018-01-12 VITALS
HEIGHT: 66 IN | SYSTOLIC BLOOD PRESSURE: 134 MMHG | DIASTOLIC BLOOD PRESSURE: 65 MMHG | WEIGHT: 147 LBS | TEMPERATURE: 97.9 F | OXYGEN SATURATION: 96 % | BODY MASS INDEX: 23.63 KG/M2 | HEART RATE: 83 BPM

## 2018-01-12 DIAGNOSIS — G31.83 LEWY BODY DEMENTIA WITH BEHAVIORAL DISTURBANCE (H): Primary | ICD-10-CM

## 2018-01-12 DIAGNOSIS — G47.00 INSOMNIA, UNSPECIFIED TYPE: ICD-10-CM

## 2018-01-12 DIAGNOSIS — R53.83 FATIGUE, UNSPECIFIED TYPE: ICD-10-CM

## 2018-01-12 DIAGNOSIS — F02.818 LEWY BODY DEMENTIA WITH BEHAVIORAL DISTURBANCE (H): Primary | ICD-10-CM

## 2018-01-12 DIAGNOSIS — I10 HYPERTENSION GOAL BP (BLOOD PRESSURE) < 140/90: ICD-10-CM

## 2018-01-12 DIAGNOSIS — R25.1 TREMOR: ICD-10-CM

## 2018-01-12 DIAGNOSIS — R26.89 SHUFFLING GAIT: ICD-10-CM

## 2018-01-12 PROCEDURE — 99214 OFFICE O/P EST MOD 30 MIN: CPT | Performed by: FAMILY MEDICINE

## 2018-01-12 RX ORDER — BRIMONIDINE TARTRATE 1.5 MG/ML
SOLUTION/ DROPS OPHTHALMIC
Refills: 1 | COMMUNITY
Start: 2017-12-16 | End: 2018-03-05

## 2018-01-12 RX ORDER — TIMOLOL MALEATE 5 MG/ML
SOLUTION OPHTHALMIC
Refills: 4 | COMMUNITY
Start: 2017-12-16 | End: 2018-04-25

## 2018-01-12 RX ORDER — DONEPEZIL HYDROCHLORIDE 5 MG/1
5 TABLET, FILM COATED ORAL AT BEDTIME
Qty: 30 TABLET | Refills: 1 | Status: SHIPPED | OUTPATIENT
Start: 2018-01-12 | End: 2018-03-10

## 2018-01-12 RX ORDER — PREDNISOLONE ACETATE 10 MG/ML
SUSPENSION/ DROPS OPHTHALMIC
Refills: 3 | COMMUNITY
Start: 2017-12-26 | End: 2018-04-25

## 2018-01-12 ASSESSMENT — PAIN SCALES - GENERAL: PAINLEVEL: NO PAIN (0)

## 2018-01-12 NOTE — PROGRESS NOTES
SUBJECTIVE:   Leona David is a 86 year old male who presents to clinic today for the following health issues:       Follow up after seeing neurology    none    Problem list and histories reviewed & adjusted, as indicated.  Additional history: as documented         Reviewed and updated as needed this visit by clinical staff  Tobacco  Allergies  Meds  Med Hx  Surg Hx  Fam Hx  Soc Hx      Reviewed and updated as needed this visit by Provider            Seen [facility] late Dec    dxd with lewy body dementia      Has phys therapy and occup therapy    Home nurse coming    To get new pill machine    Memory getting worse    Patient agrees memory getting worse  Medium per patient    Patient denies need help with dressing/ bathroom    Possible will need help bathing    Sometimes hard to get dressed    Hand shakes a lot     They have to cut food up    Walker ordered for patient     Also bedrail    No falls recently  [  They tried to give him exercises to help the neck    Patient holds his head bent to right. Patient states it is more comfortable that way    Neurology considered starting two meds but did not; apparently leaving that to us    One was aricept.  The other was short acting med ? Unsure.  [facility] yet.    Physical Exam      Patient sometimes does hold his head a little bent to right, but he is able to straighten it out    Resolving nonspecific dermatitis, faint, in lower occiput/back of neck are  nontender    Not real tender over trapezei bilat or over back    abd soft    He answers some direct questions appropriately via  but much of history obtained from daughter    Walking slowly, wish shuffling gait    ASSESSMENT / PLAN:  (G31.83,  F02.81) Lewy body dementia with behavioral disturbance  (primary encounter diagnosis)  Comment: discussed in detail.  Neurology had suggested aricept type med.  Start low dose.  Discussed possible side effects.    Plan:  donepezil (ARICEPT) 5 MG tablet        See us in 1 1/2 months, sooner if needed     (R25.1) Tremor  Comment: monitor  Plan: even if patient does not have true parkinsons, he could be considered in future as candidate for parkinsonian meds given history.    (I10) Hypertension goal BP (blood pressure) < 140/90  Comment: at goal   Plan: no change     (R26.89) Shuffling gait  Comment: will monitor  Plan: in general advised patient try to increase walking/ activity as much as possible.  Okay to use walker ( that is on order apparently )      Fatigue: okay to resume B12.  Patient feels better on this and wants to restart. Very unlikely to have any downside.    Insomnia: using prn melatonin. This is safest sleep med to use.     I reviewed the patient's medications, allergies, medical history, family history, and social history.    Arie Dahl MD

## 2018-01-12 NOTE — TELEPHONE ENCOUNTER
Forms [facility] from:  [facility]   Phone number listed: [phone]   Fax listed: [phone]  Date received: 1-12-18  Form description: OT 2 week 2, 1 week 1  Once forms are completed, please return to [place] via fax.  Is patient requesting to be contacted when forms are completed: n/a  Form placed: provider desrosa Garcia

## 2018-01-12 NOTE — NURSING NOTE
"Chief Complaint   Patient presents with     RECHECK       Initial /65 (BP Location: Left arm, Patient Position: Chair, Cuff Size: Adult Regular)  Pulse 83  Temp 97.9  F (36.6  C) (Oral)  Ht 5' 6.3\" (1.684 m)  Wt 147 lb (66.7 kg)  SpO2 96%  BMI 23.51 kg/m2 Estimated body mass index is 23.51 kg/(m^2) as calculated from the following:    Height as of this encounter: 5' 6.3\" (1.684 m).    Weight as of this encounter: 147 lb (66.7 kg).  Medication Reconciliation: complete   Jamila Mcmanus CMA      "

## 2018-01-12 NOTE — PATIENT INSTRUCTIONS
Start the low dose aricept ( donepezil ) 5 mg once daily    Okay to restart the over the counter B12     Okay to try as needed melatonin    Continue other medications as is

## 2018-01-12 NOTE — MR AVS SNAPSHOT
"              After Visit Summary   1/12/2018    Leona David    MRN: [id number]           Patient Information     Date Of Birth          10/20/1931        Visit Information        Provider Department      1/12/2018 2:15 PM Arie Dahl MD; [employer] [facility]        Today's Diagnoses     Lewy body dementia with behavioral disturbance    -  1      Care Instructions    Start the low dose aricept ( donepezil ) 5 mg once daily    Okay to restart the over the counter B12     Okay to try as needed melatonin    Continue other medications as is               Follow-ups after your visit        Who to contact     If you have questions or need follow up information about today's clinic visit or your schedule please contact [facility] directly at [phone].  Normal or non-critical lab and imaging results will be communicated to you by MyChart, letter or phone within 4 business days after the clinic has received the results. If you do not hear from us within 7 days, please contact the clinic through [employer] or phone. If you have a critical or abnormal lab result, we will notify you by phone as soon as possible.  Submit refill requests through [employer] or call your pharmacy and they will forward the refill request to us. Please allow 3 business days for your refill to be completed.          Additional Information About Your Visit        [employer] Information     [employer] lets you send messages to your doctor, view your test results, renew your prescriptions, schedule appointments and more. To sign up, go to [email or web address] . Click on \"Log in\" on the left side of the screen, which will take you to the Welcome page. Then click on \"Sign up Now\" on the right side of the page.     You will be asked to enter the access code listed below, as well as some personal information. Please follow the directions to create your username and password.     Your access " "code is: [email or web address]  Expires: 2018  2:54 PM     Your access code will  in 90 days. If you need help or a new code, please call your [place] clinic or [phone].        Care EveryWhere ID     This is your Care EveryWhere ID. This could be used by other organizations to access your [place] medical records  [id number]        Your Vitals Were     Pulse Temperature Height Pulse Oximetry BMI (Body Mass Index)       83 97.9  F (36.6  C) (Oral) 5' 6.3\" (1.684 m) 96% 23.51 kg/m2        Blood Pressure from Last 3 Encounters:   18 134/65   17 140/69   17 152/82    Weight from Last 3 Encounters:   18 147 lb (66.7 kg)   17 146 lb (66.2 kg)   17 145 lb 12.8 oz (66.1 kg)              Today, you had the following     No orders found for display         Today's Medication Changes          These changes are accurate as of: 18  3:28 PM.  If you have any questions, ask your nurse or doctor.               Start taking these medicines.        Dose/Directions    donepezil 5 MG tablet   Commonly known as:  ARICEPT   Used for:  Lewy body dementia with behavioral disturbance   Started by:  Arie Dahl MD        Dose:  5 mg   Take 1 tablet (5 mg) by mouth At Bedtime   Quantity:  30 tablet   Refills:  1            Where to get your medicines      These medications were sent to [facility] [address] [place] [address]     Phone:  [phone]     donepezil 5 MG tablet                Primary Care Provider Office Phone # Fax #    Arie Dahl [phone]       [address] [facility] [address]        Equal Access to Services     JEANNIE PANDYA AH: Andria duron Soklaus, waaxda luqadaha, qaybta kaalmada adeegyada, virgilio [place]. [facility] [phone].    ATENCIÓN: Si habla español, tiene a hernandez disposición servicios gratuitos de asistencia lingüística. Llame " al [phone].    We comply with applicable federal civil rights laws and [place] laws. We do not discriminate on the basis of race, color, national origin, age, disability, sex, sexual orientation, or gender identity.            Thank you!     Thank you for choosing [facility]  for your care. Our goal is always to provide you with excellent care. Hearing back from our patients is one way we can continue to improve our services. Please take a few minutes to complete the written survey that you may receive in the mail after your visit with us. Thank you!             Your Updated Medication List - Protect others around you: Learn how to safely use, store and throw away your medicines at www.disposemymeds.org.          This list is accurate as of: 1/12/18  3:28 PM.  Always use your most recent med list.                   Brand Name Dispense Instructions for use Diagnosis    aspirin 81 MG EC tablet      1 TABLET DAILY        atorvastatin 10 MG tablet    LIPITOR    90 tablet    Take 1 tablet (10 mg) by mouth daily    Hyperlipidemia with target LDL less than 130       brimonidine 0.15 % ophthalmic solution    ALPHAGAN-P          CALCIUM 600/VITAMIN D3 PO      Take 600 mg by mouth daily        cyanocolbalamin 100 MCG tablet    vitamin  B-12     Take 50 mcg by mouth daily        donepezil 5 MG tablet    ARICEPT    30 tablet    Take 1 tablet (5 mg) by mouth At Bedtime    Lewy body dementia with behavioral disturbance       Fish Oil 645 MG Caps      Take 645 mg by mouth daily        hydrochlorothiazide 12.5 MG Tabs tablet     90 tablet    TAKE ONE TABLET BY MOUTH ONE TIME DAILY    Essential hypertension with goal blood pressure less than 140/90       MULTIVITAMIN PO      Take by mouth daily        order for DME     1 each    Equipment being ordered: Wheelchair Patient should have a 4 wheel walker with a chair and brakes    Dementia due to medical condition with behavioral disturbance        prednisoLONE acetate 1 % ophthalmic susp    PRED FORTE          sodium chloride 5 % ophthalmic ointment    EMILIO 128     1 Application At Bedtime        timolol 0.5 % ophthalmic gel-form    TIMOPTIC-XE          vitamin  B-1 250 MG Tabs      Take 250 mg by mouth daily

## 2018-01-15 ENCOUNTER — CARE COORDINATION (OUTPATIENT)
Dept: GERIATRIC MEDICINE | Facility: CLINIC | Age: 83
End: 2018-01-15

## 2018-01-15 NOTE — PROGRESS NOTES
"1/12/18-  Received message from INES Mendez. She said Thu needs a petite or mini size walker. He's 5'2 or 5'3\" and 145 lbs.     Called [employer] and spoke with [place]. I ordered a regular walker as it can be adjusted. The petite one would have to be under waiver. There is a 10 day return policy so if it doesn't work for him we can return it.     Corrie ROTHMAN, [facility] Care Coordinator   Telephone: [phone]  Fax: [phone]    1/15/18-  Called [facility] and KARTHIK.  Called Lamonte and KARTHIK.  Lamonte called me back. She has not heard from [facility]. I will try other agencies. Lamonte said OT/PT will be out today. I let her know that I ordered a walker and we have a 10 day return window if the walker isn't the right size. Lamonte said her dad's neck seems better, he's been exercising it.   She said the nurse is coming 1/19 to meet the [facility] people to set up the med machine.   Lamonte also told me that her dad saw Dr Dahl and he wants to start with low dose aricept only and see how he responds.   Lamonte also said she will be talking with management at the apartment complex where her mom and dad live to try and get a 2 bedroom apartment for them. It would be good if her mom has a room of her own so she can get some sleep when Thu is up and down at night.     I received a message from someone at [facility] that they are a PCA choice company and client's bring PCA's to them to be hired. They do not link PCA's to clients. There is a job board through their Human Resources that client's can look at for PCA's but would have to find someone on their own.    Corrie ROTHMAN, [facility] Care Coordinator   Telephone: [phone]  Fax: [phone]      "

## 2018-01-15 NOTE — PROGRESS NOTES
Received after visit chart from care coordinator.  Completed following tasks: Mailed copy of care plan to client, Updated services in access, Submitted referrals/auths for medication dispenser and Entered MMIS.   Chart was returned to ANUPAM.     Savanna Mckinney  Case Management Specialist  [facility]  [phone]

## 2018-01-16 ENCOUNTER — CARE COORDINATION (OUTPATIENT)
Dept: GERIATRIC MEDICINE | Facility: CLINIC | Age: 83
End: 2018-01-16

## 2018-01-16 NOTE — PROGRESS NOTES
1/16/18-  Called [facility] and made referral for 5.75 hrs of PCA. Spoke with Deidre. [phone]    Corrie ROTHMAN, [facility] Care Coordinator   Telephone: [phone]  Fax: [phone]

## 2018-01-17 ENCOUNTER — CARE COORDINATION (OUTPATIENT)
Dept: GERIATRIC MEDICINE | Facility: CLINIC | Age: 83
End: 2018-01-17

## 2018-01-17 NOTE — PROGRESS NOTES
1/17/18-  Made referral for PCA to [facility]-[phone]. Spoke with Dir Tanesha of Nursing.    Corrie ROTHMAN, [facility] Care Coordinator   Telephone: [phone]  Fax: [phone]

## 2018-01-18 ENCOUNTER — CARE COORDINATION (OUTPATIENT)
Dept: GERIATRIC MEDICINE | Facility: CLINIC | Age: 83
End: 2018-01-18

## 2018-01-18 NOTE — PROGRESS NOTES
1/18/18-  Received from CMS; from [facility] site:Leona David ([birth year]) - denial/terminations [facility]    Corrie ROTHMAN, [facility] Care Coordinator   Telephone: [phone]  Fax: [phone]

## 2018-01-22 ENCOUNTER — TELEPHONE (OUTPATIENT)
Dept: FAMILY MEDICINE | Facility: CLINIC | Age: 83
End: 2018-01-22
Payer: COMMERCIAL

## 2018-01-22 DIAGNOSIS — F03.91 DEMENTIA WITH BEHAVIORAL DISTURBANCE, UNSPECIFIED DEMENTIA TYPE: Primary | ICD-10-CM

## 2018-01-23 ENCOUNTER — CARE COORDINATION (OUTPATIENT)
Dept: GERIATRIC MEDICINE | Facility: CLINIC | Age: 83
End: 2018-01-23

## 2018-01-23 DIAGNOSIS — Z76.89 HEALTH CARE HOME: ICD-10-CM

## 2018-01-23 NOTE — PROGRESS NOTES
1/23/18-  Called Aburto to see if equipment arrived. No, nothing has come.   She also said [facility] called and wanted to set up appt to meet regarding PCA. Aburto needs to call them back. She said her dad does not want anyone bathing him, he said he can do this by himself. The PCA can do other things though so she thought having someone there 8-2 pm would work.   The medication machine was installed Friday. Lamonte said her mom is reminding Thu to take his meds when it goes off until he gets the hang of it.   Aburto said her dad heard that fish oil is not good so doesn't want to take it anymore. The nurse told Lamonte to inform his PCP.    Called [employer] and the documentation dept is reviewing the service agreement to make sure everything is good and then will send out the equipment.     Corrie ROTHMAN, [facility] Care Coordinator   Telephone: [phone]  Fax: [phone]

## 2018-01-24 ENCOUNTER — CARE COORDINATION (OUTPATIENT)
Dept: GERIATRIC MEDICINE | Facility: CLINIC | Age: 83
End: 2018-01-24

## 2018-01-24 DIAGNOSIS — Z53.9 DIAGNOSIS NOT YET DEFINED: Primary | ICD-10-CM

## 2018-01-24 PROBLEM — F03.918 UNSPECIFIED DEMENTIA WITH BEHAVIORAL DISTURBANCE: Status: ACTIVE | Noted: 2018-01-24

## 2018-01-24 PROCEDURE — G0180 MD CERTIFICATION HHA PATIENT: HCPCS | Performed by: FAMILY MEDICINE

## 2018-01-24 RX ORDER — UBIDECARENONE 75 MG
100 CAPSULE ORAL DAILY
COMMUNITY
Start: 2018-01-24 | End: 2018-04-25

## 2018-01-24 NOTE — PROGRESS NOTES
1/24/18-  Received message from MARILYN Jiang at [facility]. She asked for PCA verbal auth to start 1/30. She has intake appt set up with Aburto and Thu for 1/30.    Called Deidre back [phone] and left verbal auth for 5.75 hrs/day of PCA.    Corrie ROTHMAN, [facility] Care Coordinator   Telephone: [phone]  Fax: [phone]

## 2018-01-24 NOTE — TELEPHONE ENCOUNTER
Medications reconciled on [facility] form, changes noted on form.  Form to PCP for signature.    Teresita Daniel RN  [facility]

## 2018-01-30 ENCOUNTER — CARE COORDINATION (OUTPATIENT)
Dept: GERIATRIC MEDICINE | Facility: CLINIC | Age: 83
End: 2018-01-30

## 2018-01-31 ENCOUNTER — TELEPHONE (OUTPATIENT)
Dept: FAMILY MEDICINE | Facility: CLINIC | Age: 83
End: 2018-01-31

## 2018-01-31 NOTE — TELEPHONE ENCOUNTER
Reason for Call:  Other     Detailed comments: Daughter stated that father is being treated for H pylori and he is currently on the third round. Please advise.    Phone Number Patient can be reached at: Other phone number:    Lamonte Sampson (DAUGHTER) [phone]         Best Time:     Can we leave a detailed message on this number? Not Applicable    Call taken on 1/31/2018 at 9:21 AM by Melany Garcia

## 2018-01-31 NOTE — TELEPHONE ENCOUNTER
BEHZAD on file to discuss with patient's daughter.   The message written below was inaccurate.    Called daughter who stated that patient finished his course of 2 antibiotics for his H. Pylori. He did not take the Omeprazole as instructed for a month however and still has the prescription. Daughter is wondering if he should finish the prescription for the Omeprazole or if he can discard it. Daughter says patient does not complain of abdominal pain/diarrhea.     Routed to provider to advise.  Anitra Miles RN  [facility]

## 2018-01-31 NOTE — TELEPHONE ENCOUNTER
Notified Aburto of the orders below per PCP.  She stated understanding and agreeable with the plan of care. Dyan Szymanski RN CPC Triage.

## 2018-01-31 NOTE — TELEPHONE ENCOUNTER
I advise finishing up the prescription for omeprazole, just take one pill daily till pills are gone.    Please call.    Arie Dahl MD

## 2018-02-01 NOTE — PROGRESS NOTES
1/30/18-  Aburto called. She asked if there was anyway we could split the PCA hrs to have some homemaking hours for her dad? She said the PCA won't do any homemaking tasks that he needs; this has to be done by a homemaker. I told her when I spoke with Deidre in the beginning, she said the PCA could do some light housekeeping if there was time. Aburto said she was differently. I will call Deidre.    Corrie ROTHMAN, [facility] Care Coordinator   Telephone: [phone]  Fax: [phone]    1/31/18-  Called and LM for Deidre. [phone].  Deidre called and LM.    Received email from CMS; From [facility] site: Thu Frankie ([birth year]) - [facility] - 01/30/18-12/31/18 - 5.75 hrs/day PCA      Corrie ROTHMAN, [facility] Care Coordinator   Telephone: [phone]  Fax: [phone]    2/1/18-  Called Deidre and LM that I would like to add 3 hrs/week of homemaking for Thu. Asked for call back.   Deidre called back. The current PCA will also be the homemaker so Deidre will go back out and complete the care plan for homemaking. I told Deider that I will not change the PCA hours as Thu can use what he needs and sign off on the time cards accordingly.     Called Aburto and let her know above.    Corrie ROTHMAN, [facility] Care Coordinator   Telephone: [phone]  Fax: [phone]

## 2018-02-07 ENCOUNTER — CARE COORDINATION (OUTPATIENT)
Dept: GERIATRIC MEDICINE | Facility: CLINIC | Age: 83
End: 2018-02-07

## 2018-02-08 NOTE — PROGRESS NOTES
2/7/18-  Received message from Deidre at [facility]. She said she met with Thu and Aburto today to open homemaking. She also wondered if I will be sending the change of provider form to [facility] for his PCA?     I left a message later on for Deidre that eknton never had PCA before so there is no change of provider. [facility] received his PCA assessment and auth for homemaking.    Corrie ROTHMAN, [facility] Care Coordinator   Telephone: [phone]  Fax: [phone]

## 2018-02-19 ENCOUNTER — TELEPHONE (OUTPATIENT)
Dept: FAMILY MEDICINE | Facility: CLINIC | Age: 83
End: 2018-02-19

## 2018-02-19 DIAGNOSIS — G20.C PARKINSONISM, UNSPECIFIED PARKINSONISM TYPE (H): Primary | ICD-10-CM

## 2018-02-19 RX ORDER — CARBIDOPA AND LEVODOPA 25; 100 MG/1; MG/1
TABLET ORAL
Qty: 90 TABLET | Refills: 0 | Status: SHIPPED | OUTPATIENT
Start: 2018-02-19 | End: 2018-03-20

## 2018-02-19 NOTE — TELEPHONE ENCOUNTER
Yes the other med is one for parkinson.  I called and discussed in detail with daughter. Sent in med.  See us in a month.  She agreed with plan.  Arie Dahl MD

## 2018-02-19 NOTE — TELEPHONE ENCOUNTER
Reason for Call:  Other - Patient Update    Detailed comments: Lamonte, patient's daughter, called and stated patient refuses to take  Because he does not think he has a problem with his memory. Lamonte stated Dr. Dahl had mentioned there was another medication patient could take to help with his motor skills. She is wondering if they should try to have patient take this instead. Please call back to discuss.    Phone Number Patient can be reached at: Ha: [phone]    Best Time: Anytime    Can we leave a detailed message on this number? YES    Call taken on 2/19/2018 at 3:15 PM by Disha Moreira

## 2018-02-19 NOTE — TELEPHONE ENCOUNTER
Called and talked to HA. She stated that patient is refusing to take the Aricept. He read the information on it, and he stated that he does not have dementia, and so he doesn't need a medication.  She stated that PCP had discussed another medication that could be used for his motor skills.    Routed to PCP to advise.  Dyan Szymanski RN CPC Triage.

## 2018-02-26 ENCOUNTER — TELEPHONE (OUTPATIENT)
Dept: FAMILY MEDICINE | Facility: CLINIC | Age: 83
End: 2018-02-26

## 2018-02-26 ENCOUNTER — OFFICE VISIT (OUTPATIENT)
Dept: FAMILY MEDICINE | Facility: CLINIC | Age: 83
End: 2018-02-26
Payer: COMMERCIAL

## 2018-02-26 VITALS
HEART RATE: 94 BPM | DIASTOLIC BLOOD PRESSURE: 72 MMHG | TEMPERATURE: 98.1 F | WEIGHT: 142 LBS | BODY MASS INDEX: 22.71 KG/M2 | SYSTOLIC BLOOD PRESSURE: 130 MMHG | OXYGEN SATURATION: 96 %

## 2018-02-26 DIAGNOSIS — G20.C PARKINSONISM, UNSPECIFIED PARKINSONISM TYPE (H): ICD-10-CM

## 2018-02-26 DIAGNOSIS — I10 HYPERTENSION GOAL BP (BLOOD PRESSURE) < 140/90: ICD-10-CM
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
## 2018-02-26 DIAGNOSIS — F02.818 LEWY BODY DEMENTIA WITH BEHAVIORAL DISTURBANCE (H): Primary | ICD-10-CM

## 2018-02-26 DIAGNOSIS — G31.83 LEWY BODY DEMENTIA WITH BEHAVIORAL DISTURBANCE (H): Primary | ICD-10-CM

## 2018-02-26 PROCEDURE — 99214 OFFICE O/P EST MOD 30 MIN: CPT | Performed by: FAMILY MEDICINE

## 2018-02-26 ASSESSMENT — PAIN SCALES - GENERAL: PAINLEVEL: NO PAIN (0)

## 2018-02-26 NOTE — PROGRESS NOTES
SUBJECTIVE:   Leona David is a 86 year old male who presents to clinic today for the following health issues:       Woke up at 3 this morning and felt really dizzy then he fell to the floor, he also lost his bladder control twice within this time    none    Problem list and histories reviewed & adjusted, as indicated.  Additional history: as documented         Reviewed and updated as needed this visit by clinical staff  Allergies  Meds       Reviewed and updated as needed this visit by Provider          did not hit head, and wife does not think he did    Patient remembers falling    Fell after going to bathroom    No other falls recently    He was on his way to the bathroom each time ; couldn't make it in time    Thus didn't really lose control     Walks around in apartment    They have tried to get some help with exercise but that person was female    Waiting for son to come back from [place]    Eating and drinking normal    Daughter was able to get him to start the donepezil    They have the parkinsons med but will start that this Friday when nurse puts them all in the dispenser    Brother not working full time  Coming back in about 10 days from [place]    No pain anywhere as a result of the fall this am      Physical Exam   Constitutional: He is oriented to person, place, and time and well-developed, well-nourished, and in no distress. No distress.   HENT:   Head: Normocephalic and atraumatic.   Eyes: Conjunctivae are normal.   Neck: Carotid bruit is not present.   Cardiovascular: Normal rate, regular rhythm, normal heart sounds and intact distal pulses.  Exam reveals no gallop and no friction rub.    No murmur heard.  Pulmonary/Chest: Effort normal and breath sounds normal. No respiratory distress. He has no wheezes. He has no rales.   Musculoskeletal: He exhibits no edema.   Neurological: He is alert and oriented to person, place, and time.   Skin: He is not diaphoretic.   Psychiatric: Mood and affect normal.      ASSESSMENT / PLAN:  (G31.83,  F02.81) Lewy body dementia with behavioral disturbance  (primary encounter diagnosis)  Comment: I went over the neurology consult with them. Discussed in detail.   Plan: patient did agree to start the donepezil     (G20) Parkinsonism, unspecified Parkinsonism type (H)  Comment: patient will be starting the crabidopa/ levodopa later this week  Plan: see us about a month after being on this     (I10) Hypertension goal BP (blood pressure) < 140/90  Comment: at goal on recheck   Plan: no change in meds     (W19.XXXA) Fall, initial encounter  Comment: no injuries from fall this am  Plan: overall encouraged patient to increase walking/ activity so as to remain as independent as possible      Of note I did clarify the urination issues.  It was a matter of not making it to the bathroom in time twice.  Not an actual loss of bladder control.       I reviewed the patient's medications, allergies, medical history, family history, and social history.    Arie Dahl MD

## 2018-02-26 NOTE — MR AVS SNAPSHOT
"              After Visit Summary   2/26/2018    Leona David    MRN: [id number]           Patient Information     Date Of Birth          10/20/1931        Visit Information        Provider Department      2/26/2018 2:00 PM Arie Dahl MD; [employer] [facility]        Care Instructions    Increase walking as able    Start the new med as planned ( to help tremor )    After being on that for about a a month, see us in clinic    Stay well hydrated but not too much right before bed              Follow-ups after your visit        Who to contact     If you have questions or need follow up information about today's clinic visit or your schedule please contact [facility] directly at [phone].  Normal or non-critical lab and imaging results will be communicated to you by MyChart, letter or phone within 4 business days after the clinic has received the results. If you do not hear from us within 7 days, please contact the clinic through MyChart or phone. If you have a critical or abnormal lab result, we will notify you by phone as soon as possible.  Submit refill requests through GeckoGo or call your pharmacy and they will forward the refill request to us. Please allow 3 business days for your refill to be completed.          Additional Information About Your Visit        MyChart Information     GeckoGo lets you send messages to your doctor, view your test results, renew your prescriptions, schedule appointments and more. To sign up, go to [email or web address] . Click on \"Log in\" on the left side of the screen, which will take you to the Welcome page. Then click on \"Sign up Now\" on the right side of the page.     You will be asked to enter the access code listed below, as well as some personal information. Please follow the directions to create your username and password.     Your access code is: [email or web address]  Expires: 5/27/2018  2:44 PM     Your access " code will  in 90 days. If you need help or a new code, please call your [place] clinic or [phone].        Care EveryWhere ID     This is your Care EveryWhere ID. This could be used by other organizations to access your [place] medical records  [id number]        Your Vitals Were     Pulse Temperature Pulse Oximetry BMI (Body Mass Index)          94 98.1  F (36.7  C) (Oral) 96% 22.71 kg/m2         Blood Pressure from Last 3 Encounters:   18 130/72   18 134/65   17 140/69    Weight from Last 3 Encounters:   18 142 lb (64.4 kg)   18 147 lb (66.7 kg)   17 146 lb (66.2 kg)              Today, you had the following     No orders found for display       Primary Care Provider Office Phone # Fax #    Arie Dahl [phone]       [address] [facility] [address]        Equal Access to Services     JEANNIE PANDYA : Hadii aad ku hadasho Soomaali, waaxda luqadaha, qaybta kaalmada adeegyada, waxay idiin hayaan kolton woods . So [facility] [phone].    ATENCIÓN: Si habla español, tiene a hernandez disposición servicios gratuitos de asistencia lingüística. Llame al [phone].    We comply with applicable federal civil rights laws and [place] laws. We do not discriminate on the basis of race, color, national origin, age, disability, sex, sexual orientation, or gender identity.            Thank you!     Thank you for choosing [facility]  for your care. Our goal is always to provide you with excellent care. Hearing back from our patients is one way we can continue to improve our services. Please take a few minutes to complete the written survey that you may receive in the mail after your visit with us. Thank you!             Your Updated Medication List - Protect others around you: Learn how to safely use, store and throw away your medicines at www.disposemymeds.org.          This list is accurate as of 18  2:44 PM.   Always use your most recent med list.                   Brand Name Dispense Instructions for use Diagnosis    aspirin 81 MG EC tablet      1 TABLET DAILY        atorvastatin 10 MG tablet    LIPITOR    90 tablet    Take 1 tablet (10 mg) by mouth daily    Hyperlipidemia with target LDL less than 130       brimonidine 0.15 % ophthalmic solution    ALPHAGAN-P          CALCIUM 600/VITAMIN D3 PO      Take 600 mg by mouth daily        carbidopa-levodopa  MG per tablet    SINEMET    90 tablet    1/2 tab po tid.  Then after 2 weeks go to 1 tab po tid.    Parkinsonism, unspecified Parkinsonism type (H)       cyanocolbalamin 100 MCG tablet    vitamin  B-12     Take 1 tablet (100 mcg) by mouth daily    Dementia with behavioral disturbance, unspecified dementia type       donepezil 5 MG tablet    ARICEPT    30 tablet    Take 1 tablet (5 mg) by mouth At Bedtime    Lewy body dementia with behavioral disturbance       Fish Oil 645 MG Caps      Take 645 mg by mouth daily        hydrochlorothiazide 12.5 MG Tabs tablet     90 tablet    TAKE ONE TABLET BY MOUTH ONE TIME DAILY    Essential hypertension with goal blood pressure less than 140/90       MULTIVITAMIN PO      Take by mouth daily        omeprazole 20 MG CR capsule    priLOSEC    30 capsule    Take 1 capsule (20 mg) by mouth daily    H. 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

## 2018-02-26 NOTE — TELEPHONE ENCOUNTER
Reason for call:  Patient reporting a symptom    Symptom or request: Pt was dizzy & fell   Duration (how long have symptoms been present):@ 3:00 am this morning       Have you been treated for this before? No    Additional comments: Pt daughter called stating her father was walking back to bed from the bathroom this morning & got very dizzy & fell. She would like to know if he should be seen. Patient is feeling fine now.     Phone Number patient can be reached at: home [phone]  Telephone Information:           Best Time:  Anytime     Can we leave a detailed message on this number:  YES    Call taken on 2/26/2018 at 8:08 AM by Laura Quevedo

## 2018-02-26 NOTE — TELEPHONE ENCOUNTER
Attempt # 1  Called patient at home number.  Was call answered?  Nurse sees consent to communicate with daughters. Yes, daughter states father feels fine now, is speaking coherently at this time and denies further dizziness. Daughter is not with father at this time. Nurse requested daughter go to father's house and check his BP and oxygen level if able - daughter verbalized understanding and agreement.   Scheduled OV 2 pm today also has a 3 pm appointment for Wednesday this week?    Teresita Daniel RN  [facility]

## 2018-02-26 NOTE — PATIENT INSTRUCTIONS
Increase walking as able    Start the new med as planned ( to help tremor )    After being on that for about a a month, see us in clinic    Stay well hydrated but not too much right before bed

## 2018-02-27 NOTE — TELEPHONE ENCOUNTER
Patient seen in clinic and did cancel Wednesday appointment.    Teresita Daniel RN  [facility]

## 2018-02-28 ENCOUNTER — TELEPHONE (OUTPATIENT)
Dept: FAMILY MEDICINE | Facility: CLINIC | Age: 83
End: 2018-02-28

## 2018-02-28 NOTE — TELEPHONE ENCOUNTER
Routing to PCP to review and advise.    HC requesting orders.    Teresita Danile RN  [facility]

## 2018-02-28 NOTE — TELEPHONE ENCOUNTER
Reason for Call: Request for an order or referral: Kenna called for orders     Order or referral being requested: skilled nursing 1 time a week for 9 weeks, 3 PRN's     Date needed: as soon as possible    Has the patient been seen by the PCP for this problem? YES    Additional comments: verbal ok     Phone number Patient can be reached at:  Other phone number: [phone]    Best Time:  any    Can we leave a detailed message on this number?  YES    Call taken on 2/28/2018 at 2:54 PM by Susan Holman

## 2018-02-28 NOTE — TELEPHONE ENCOUNTER
Called Kenna at [phone] , left message to call nurse line.    Teresita Danile RN  [facility]

## 2018-03-02 NOTE — TELEPHONE ENCOUNTER
Called Kenna at [phone] - relayed order okay per provider - Kenna verbalized understanding.    Teresita Daniel RN  [facility]

## 2018-03-03 ENCOUNTER — CARE COORDINATION (OUTPATIENT)
Dept: GERIATRIC MEDICINE | Facility: CLINIC | Age: 83
End: 2018-03-03

## 2018-03-05 ENCOUNTER — TELEPHONE (OUTPATIENT)
Dept: FAMILY MEDICINE | Facility: CLINIC | Age: 83
End: 2018-03-05
Payer: COMMERCIAL

## 2018-03-05 DIAGNOSIS — Z53.9 DIAGNOSIS NOT YET DEFINED: Primary | ICD-10-CM

## 2018-03-05 DIAGNOSIS — F03.91 DEMENTIA WITH BEHAVIORAL DISTURBANCE, UNSPECIFIED DEMENTIA TYPE: Primary | ICD-10-CM

## 2018-03-05 DIAGNOSIS — H40.9 GLAUCOMA, UNSPECIFIED GLAUCOMA TYPE, UNSPECIFIED LATERALITY: ICD-10-CM

## 2018-03-05 PROCEDURE — G0179 MD RECERTIFICATION HHA PT: HCPCS | Performed by: FAMILY MEDICINE

## 2018-03-05 RX ORDER — BRIMONIDINE TARTRATE 1.5 MG/ML
SOLUTION/ DROPS OPHTHALMIC
Qty: 1 BOTTLE | Refills: 1 | COMMUNITY
Start: 2018-03-05 | End: 2022-09-27

## 2018-03-05 NOTE — TELEPHONE ENCOUNTER
Forms received from: [facility]   Phone number listed: [phone]   Fax listed: [phone]  Date received: 03/05/2018  Form description: home health certification and plan of care  Once forms are completed, please return to [facility] via fax.  Form placed: in Team 3 nurse folder  Melany Garcia    Form given to RN to review med list  Orders pended for provider to sign

## 2018-03-05 NOTE — TELEPHONE ENCOUNTER
Medications reconciled on [facility] form, changes noted on form.  Form to PCP for signature.    Teresita Daniel RN  [facility]

## 2018-03-07 NOTE — PROGRESS NOTES
3/3/18-  Aburto called and left message to tell me her brother is back from [place] so would like to start the process to be her dad's PCA and homemaker.     Corrie ROTHMAN, [facility] Care Coordinator   Telephone: [phone]  Fax: [phone]    3/5/18-  Called Aburto back. She called [facility] and the HR person is gone this week so will have to wait till he is back. She will have her brother go talk to him next week about being hired.    Corrie ROTHMAN, [facility] Care Coordinator   Telephone: [phone]  Fax: [phone]

## 2018-03-12 ENCOUNTER — CARE COORDINATION (OUTPATIENT)
Dept: GERIATRIC MEDICINE | Facility: CLINIC | Age: 83
End: 2018-03-12

## 2018-03-14 NOTE — PROGRESS NOTES
3/12/18-  Received message from Lamonte that they need a bigger lock box for her dad's meds. She wondered if I can get this.     Leonel while later received message from Nereida, the other daughter from [place] that she will be in town on Wednesday and would like to take her mom to the adult day care and wondered if her mom still has this service.     Called Nereida back, [phone]. Told her yes, her mom still has the ADC service available to her. Nereida said her mom misses her friends at the day care and going there so she will take her when she is here.    Corrie ROTHMAN, [facility] Care Coordinator   Telephone: [phone]  Fax: [phone]    3/13/18-  Received call from Mendy at [employer]. She said Aburto and her brother are there and would like to switch Thu's PCA and HM hours to their company. The brother (forgot his name) will begin the hiring process with them.   Spoke with Lamonte. She said they decided to switch because [facility] isn't calling them back and they are farther away than [facility].  Mendy will let me know when everything is complete and let me know start date.     Corrie ROTHMAN, [facility] Care Coordinator   Telephone: [phone]  Fax: [phone]

## 2018-03-16 ENCOUNTER — TELEPHONE (OUTPATIENT)
Dept: FAMILY MEDICINE | Facility: CLINIC | Age: 83
End: 2018-03-16

## 2018-03-16 ENCOUNTER — CARE COORDINATION (OUTPATIENT)
Dept: GERIATRIC MEDICINE | Facility: CLINIC | Age: 83
End: 2018-03-16

## 2018-03-16 NOTE — TELEPHONE ENCOUNTER
Called   [facility] (HOME CARE) [phone]      Left message on voicemail to return phone call to triage.  Dyan Szymanski, RN [facility] Triage.

## 2018-03-16 NOTE — LETTER
[address] [id number] [phone]               2018    To whom it may concern:    Leona David (  [birth year] ) is a patient of mine with multiple medical problems.  He uses a wheeled walker and ideally should have a 2 bedroom apartment so he can maneuver safely.             Sincerely,                   Arie Dahl MD

## 2018-03-16 NOTE — TELEPHONE ENCOUNTER
Reason for Call:  Other call back    Detailed comments:  Gabriela calling from  Home Care.  Saw patient today and daughter, Nereida, has some requests.    1) Letter of Medical Necessity for a 2 bedroom apartment.  Due to safety reasons, patient uses a walker and it is too small of a space, currently, to maneuver.  2) Handicap sticker for parking/driving.  3) FYI, patient is not raking his Aricept, 1 week now, due to not tolerating med: dizziness, felt like numb head.    Phone Number Patient can be reached at: Other phone number:  [phone], Shira Padron    Best Time: any    Can we leave a detailed message on this number? YES    Call taken on 3/16/2018 at 12:50 PM by Isatu Garcia    Daughter nereida, ph [phone]

## 2018-03-16 NOTE — TELEPHONE ENCOUNTER
I completed handicap form for them to use for patient as passenger.  He should not be driving.  Please call family to let them know this.  No driving.    They can fill out the top of the form.    Also see letter.    I put both these in TC basket for Monday.    Arie Dahl MD

## 2018-03-19 ENCOUNTER — TELEPHONE (OUTPATIENT)
Dept: FAMILY MEDICINE | Facility: CLINIC | Age: 83
End: 2018-03-19

## 2018-03-19 NOTE — TELEPHONE ENCOUNTER
Forms received from: [employer]   Phone number listed: [phone]   Fax listed: [phone]  Date received: 3-19-18  Form description: lockmed box  Once forms are completed, please return to [employer] via fax.  Is patient requesting to be contacted when forms are completed: n/a  Form placed: provider desk  Isatu Garcia

## 2018-03-20 ENCOUNTER — OFFICE VISIT (OUTPATIENT)
Dept: FAMILY MEDICINE | Facility: CLINIC | Age: 83
End: 2018-03-20
Payer: COMMERCIAL

## 2018-03-20 VITALS
TEMPERATURE: 97.1 F | WEIGHT: 140 LBS | BODY MASS INDEX: 22.39 KG/M2 | DIASTOLIC BLOOD PRESSURE: 70 MMHG | OXYGEN SATURATION: 96 % | SYSTOLIC BLOOD PRESSURE: 124 MMHG | HEART RATE: 97 BPM

## 2018-03-20 DIAGNOSIS — J06.9 UPPER RESPIRATORY TRACT INFECTION, UNSPECIFIED TYPE: Primary | ICD-10-CM

## 2018-03-20 DIAGNOSIS — G20.C PARKINSONISM, UNSPECIFIED PARKINSONISM TYPE (H): ICD-10-CM

## 2018-03-20 PROCEDURE — 99213 OFFICE O/P EST LOW 20 MIN: CPT | Performed by: FAMILY MEDICINE

## 2018-03-20 RX ORDER — CARBIDOPA AND LEVODOPA 25; 100 MG/1; MG/1
TABLET ORAL
Qty: 90 TABLET | Refills: 1 | Status: SHIPPED | OUTPATIENT
Start: 2018-03-20 | End: 2022-04-27

## 2018-03-20 RX ORDER — AZITHROMYCIN 250 MG/1
TABLET, FILM COATED ORAL
Qty: 6 TABLET | Refills: 1 | Status: SHIPPED | OUTPATIENT
Start: 2018-03-20 | End: 2022-04-27

## 2018-03-20 NOTE — PROGRESS NOTES
SUBJECTIVE:   Leona David is a 86 year old male who presents to clinic today for the following health issues:       cough    none    Problem list and histories reviewed & adjusted, as indicated.  Additional history: as documented         Reviewed and updated as needed this visit by clinical staff  Tobacco  Allergies  Meds  Med Hx  Surg Hx  Fam Hx  Soc Hx      Reviewed and updated as needed this visit by Provider          spoke with patient via daughter on phone    Dry cough    Only a bit phlegm    Coryza a little    No headache     Not sore on neck    No fever    Tired     Not eating okay    Cough for a couple days    Maybe from wife, who had it for about 5 days    A bit of mucus    Bowels and bladder okay    Feels better today    Exam  Full physical not done     Mentation and affect are fine    No tremor of speech or extremity    Lungs clear all fields    No resp distress at all    abd soft    Heart regular    Tympanic membranes and canals okay    No sinus/ submandib tenderness    Throat okay    ASSESSMENT / PLAN:  (J06.9) Upper respiratory tract infection, unspecified type  (primary encounter diagnosis)  Comment: likely viral but gave patient a prescription to hold.  Fill if symptoms worsen/ don't improve.   Plan: azithromycin (ZITHROMAX) 250 MG tablet        Stay well hydrated. Follow up prn.     (G20) Parkinsonism, unspecified Parkinsonism type (H)  Comment: of note patient apparently did not take this regularly.  The daughters will try to make sure he takes it regularly.  Refill sent in.  Once again could start 1/2 pill initially then go to 1.  Plan: carbidopa-levodopa (SINEMET)  MG per         tablet               I reviewed the patient's medications, allergies, medical history, family history, and social history.    Arie Dahl MD

## 2018-03-20 NOTE — PATIENT INSTRUCTIONS
This is most likely a viral infection.  No antibiotic needed to start right now.    Hold prescription for antibiotic.  If symptoms worsen later this week, then fill the prescription.    Stay well hydrated.    Increase food intake as able    Refill on the carbidopa/ levodopa sent in.  It says one pill 3 x per day, but for the 1st 2 weeks can just take 1/2 pill 3x daily, then go to 1 pill after that.

## 2018-03-20 NOTE — MR AVS SNAPSHOT
"              After Visit Summary   3/20/2018    Leona David    MRN: [id number]           Patient Information     Date Of Birth          10/20/1931        Visit Information        Provider Department      3/20/2018 8:20 AM Arie Dahl MD; PHONE,  [facility]        Today's Diagnoses     Upper respiratory tract infection, unspecified type    -  1    Parkinsonism, unspecified Parkinsonism type (H)          Care Instructions    This is most likely a viral infection.  No antibiotic needed to start right now.    Hold prescription for antibiotic.  If symptoms worsen later this week, then fill the prescription.    Stay well hydrated.    Increase food intake as able    Refill on the carbidopa/ levodopa sent in.  It says one pill 3 x per day, but for the 1st 2 weeks can just take 1/2 pill 3x daily, then go to 1 pill after that.              Follow-ups after your visit        Who to contact     If you have questions or need follow up information about today's clinic visit or your schedule please contact [facility] directly at [phone].  Normal or non-critical lab and imaging results will be communicated to you by [employer], letter or phone within 4 business days after the clinic has received the results. If you do not hear from us within 7 days, please contact the clinic through iVideos[employer]st or phone. If you have a critical or abnormal lab result, we will notify you by phone as soon as possible.  Submit refill requests through Lumora or call your pharmacy and they will forward the refill request to us. Please allow 3 business days for your refill to be completed.          Additional Information About Your Visit        MyChart Information     Lumora lets you send messages to your doctor, view your test results, renew your prescriptions, schedule appointments and more. To sign up, go to [email or web address] . Click on \"Log in\" on the left side of the screen, which " "will take you to the Welcome page. Then click on \"Sign up Now\" on the right side of the page.     You will be asked to enter the access code listed below, as well as some personal information. Please follow the directions to create your username and password.     Your access code is: [email or web address]  Expires: 2018  3:44 PM     Your access code will  in 90 days. If you need help or a new code, please call your [facility] or [phone].        Care EveryWhere ID     This is your Care EveryWhere ID. This could be used by other organizations to access your [place] medical records  [id number]        Your Vitals Were     Pulse Temperature Pulse Oximetry BMI (Body Mass Index)          97 97.1  F (36.2  C) (Oral) 96% 22.39 kg/m2         Blood Pressure from Last 3 Encounters:   18 124/70   18 130/72   18 134/65    Weight from Last 3 Encounters:   18 140 lb (63.5 kg)   18 142 lb (64.4 kg)   18 147 lb (66.7 kg)              Today, you had the following     No orders found for display         Today's Medication Changes          These changes are accurate as of 3/20/18  9:14 AM.  If you have any questions, ask your nurse or doctor.               Start taking these medicines.        Dose/Directions    azithromycin 250 MG tablet   Commonly known as:  ZITHROMAX   Used for:  Upper respiratory tract infection, unspecified type   Started by:  Arie Dahl MD        2 po daily x one day then 1 po daily x 4 days   Quantity:  6 tablet   Refills:  1         These medicines have changed or have updated prescriptions.        Dose/Directions    carbidopa-levodopa  MG per tablet   Commonly known as:  SINEMET   This may have changed:  additional instructions   Used for:  Parkinsonism, unspecified Parkinsonism type (H)   Changed by:  Arie Dahl MD        1 tab po tid.   Quantity:  90 tablet   Refills:  1            Where to get your medicines      These medications were sent " [facility] [address] [facility] [place] [facility] [address] [facility]  [address] [place] [address]     Phone:  [phone]     carbidopa-levodopa  MG per tablet         Some of these will need a paper prescription and others can be bought over the counter.  Ask your nurse if you have questions.     Bring a paper prescription for each of these medications     azithromycin 250 MG tablet                Primary Care Provider Office Phone # Fax #    Arie Dahl [phone]       [address] [facility] [address]        Equal Access to Services     [facility]: Hadii aad ku hadasho Soomaali, waaxda luqadaha, qaybta kaalmada adeegyada, virgilio golden adeluigi woods . So [facility] [phone].    ATENCIÓN: Si habla español, tiene a hernandez disposición servicios gratuitos de asistencia lingüística. Haileeame al [phone].    We comply with applicable federal civil rights laws and [place] laws. We do not discriminate on the basis of race, color, national origin, age, disability, sex, sexual orientation, or gender identity.            Thank you!     Thank you for choosing [facility]  for your care. Our goal is always to provide you with excellent care. Hearing back from our patients is one way we can continue to improve our services. Please take a few minutes to complete the written survey that you may receive in the mail after your visit with us. Thank you!             Your Updated Medication List - Protect others around you: Learn how to safely use, store and throw away your medicines at www.disposemymeds.org.          This list is accurate as of 3/20/18  9:14 AM.  Always use your most recent med list.                   Brand Name Dispense Instructions for use Diagnosis    aspirin 81 MG EC tablet      1 TABLET DAILY        atorvastatin 10 MG tablet    LIPITOR    90 tablet    Take 1 tablet (10 mg) by mouth daily    Hyperlipidemia with target LDL  less than 130       azithromycin 250 MG tablet    ZITHROMAX    6 tablet    2 po daily x one day then 1 po daily x 4 days    Upper respiratory tract infection, unspecified type       brimonidine 0.15 % ophthalmic solution    ALPHAGAN-P    1 Bottle     Glaucoma, unspecified glaucoma type, unspecified laterality       CALCIUM 600/VITAMIN D3 PO      Take 600 mg by mouth daily        carbidopa-levodopa  MG per tablet    SINEMET    90 tablet    1 tab po tid.    Parkinsonism, unspecified Parkinsonism type (H)       cyanocolbalamin 100 MCG tablet    vitamin  B-12     Take 1 tablet (100 mcg) by mouth daily    Dementia with behavioral disturbance, unspecified dementia type       donepezil 5 MG tablet    ARIcept    30 tablet    Take 1 tablet (5 mg) by mouth At Bedtime    Lewy body dementia with behavioral disturbance       Fish Oil 645 MG Caps      Take 645 mg by mouth daily        hydrochlorothiazide 12.5 MG Tabs tablet     90 tablet    TAKE ONE TABLET BY MOUTH ONE TIME DAILY    Essential hypertension with goal blood pressure less than 140/90       MULTIVITAMIN PO      Take by mouth daily        omeprazole 20 MG CR capsule    priLOSEC    30 capsule    Take 1 capsule (20 mg) by mouth daily.    H. 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

## 2018-03-20 NOTE — PROGRESS NOTES
3/16/18-  Called [facility] and ordered a larger combination medication lock box.    Corrie ROTHMAN, [facility] Coordinator   Telephone: [phone]  Fax: [phone]

## 2018-03-21 ENCOUNTER — PATIENT OUTREACH (OUTPATIENT)
Dept: GERIATRIC MEDICINE | Facility: CLINIC | Age: 83
End: 2018-03-21

## 2018-03-22 NOTE — PROGRESS NOTES
3/21/18-  [facility] Care Coordination Contact  Received call from Lamonte. She said her parents are going to [place] with Nereida this afternoon. The plan is for them to be there a month.   Lamonte said that Nereida feels her mom is depressed and needs a change of scenery. Nereida also plans to have her dad try acupuncture.     Lamonte said they got refills on each of their medications so they should be fine with this. Lamonte also let Thu's nurse know he will be gone.  Lamonte said she filled out the transfer paperwork for [facility] so this should be in place for when they come back home.   Lamonte will keep me posted.     Corrie ROTHMAN, [facility] Care Coordinator   Telephone: [phone]  Fax: [phone]

## 2018-04-16 ENCOUNTER — PATIENT OUTREACH (OUTPATIENT)
Dept: GERIATRIC MEDICINE | Facility: CLINIC | Age: 83
End: 2018-04-16

## 2018-04-17 NOTE — PROGRESS NOTES
[facility] Care Coordination Contact  Received email from LAYLA Elam. From [facility] site:  Leona David ([birth year]) - At Home With Care - 03/29/18-12/31/18 - 5.75 hrs/day PCA, change of agency    Corrie ROTHMAN, [facility] Care Coordinator   Telephone: [phone]  Fax: [phone]    4/17/18-  Noticed PCA company is inaccurate. Asked Marialuisa to let [facility] know.     Corrie ROTHMAN, [facility] Care Coordinator   Telephone: [phone]  Fax: [phone]

## 2018-04-18 ENCOUNTER — PATIENT OUTREACH (OUTPATIENT)
Dept: GERIATRIC MEDICINE | Facility: CLINIC | Age: 83
End: 2018-04-18

## 2018-04-19 ENCOUNTER — TELEPHONE (OUTPATIENT)
Dept: FAMILY MEDICINE | Facility: CLINIC | Age: 83
End: 2018-04-19

## 2018-04-19 NOTE — PROGRESS NOTES
[facility] Care Coordination Contact  Received message from Lamonte that her parents have decided to stay in [place] with her sister. The county is aware and [place] will close 4/30/18.  Lamonte thanked me for all my help over the years.    Case closed.    Received message from [employer] that MARILYN Pierson let them know to cancel the MD-2 machine. They will contact Lamonte for .    Corrie ROTHMAN, [facility] Care Coordinator   Telephone: [phone]  Fax: [phone]

## 2018-04-19 NOTE — TELEPHONE ENCOUNTER
Kenna from [facility] called to let Dr Dahl know that patient has moved to [place] for Hospice Care.

## 2018-04-20 ENCOUNTER — PATIENT OUTREACH (OUTPATIENT)
Dept: GERIATRIC MEDICINE | Facility: CLINIC | Age: 83
End: 2018-04-20

## 2018-04-20 NOTE — PROGRESS NOTES
[facility] Care Coordination Contact  Received a request to submit a DTR for the termination of PCA, SNV, med dispenser, homemaking and elderly waiver. Documentation completed and faxed to the health plan.  aware.    Sangeeta Ojeda RN  Utilization   [facility]  [phone]

## 2018-04-24 ENCOUNTER — PATIENT OUTREACH (OUTPATIENT)
Dept: GERIATRIC MEDICINE | Facility: CLINIC | Age: 83
End: 2018-04-24

## 2018-04-25 ENCOUNTER — TELEPHONE (OUTPATIENT)
Dept: FAMILY MEDICINE | Facility: CLINIC | Age: 83
End: 2018-04-25
Payer: COMMERCIAL

## 2018-04-25 DIAGNOSIS — F03.91 DEMENTIA WITH BEHAVIORAL DISTURBANCE, UNSPECIFIED DEMENTIA TYPE: Primary | ICD-10-CM

## 2018-04-25 NOTE — TELEPHONE ENCOUNTER
Forms received from:  [facility]   Phone number listed: [phone]   Fax listed: [phone]  Date received: 4-25-18  Form description: [facility]  Once forms are completed, please return to [place] via fax.  Is patient requesting to be contacted when forms are completed: n/a  Form placed: RN folder  Isatu Garcia    Form given to RN to review med list.  Order pended for provider to sign.

## 2018-04-26 ENCOUNTER — MEDICAL CORRESPONDENCE (OUTPATIENT)
Dept: HEALTH INFORMATION MANAGEMENT | Facility: CLINIC | Age: 83
End: 2018-04-26

## 2018-04-26 RX ORDER — UBIDECARENONE 75 MG
100 CAPSULE ORAL DAILY
COMMUNITY
Start: 2018-04-25 | End: 2022-04-27

## 2018-04-26 RX ORDER — TIMOLOL MALEATE 5 MG/ML
SOLUTION OPHTHALMIC
Qty: 1 BOTTLE | Refills: 4 | COMMUNITY
Start: 2018-04-25 | End: 2022-09-27

## 2018-04-26 RX ORDER — PREDNISOLONE ACETATE 10 MG/ML
SUSPENSION/ DROPS OPHTHALMIC
Qty: 1 BOTTLE | Refills: 3 | COMMUNITY
Start: 2018-04-25 | End: 2022-09-27

## 2018-04-26 NOTE — TELEPHONE ENCOUNTER
Medications reconciled on [facility] form, changes noted on form.  Form to PCP for signature.    Teresita Daniel RN  [facility]

## 2018-07-23 DIAGNOSIS — I10 ESSENTIAL HYPERTENSION WITH GOAL BLOOD PRESSURE LESS THAN 140/90: ICD-10-CM

## 2018-07-23 NOTE — TELEPHONE ENCOUNTER
"Requested Prescriptions   Pending Prescriptions Disp Refills     hydrochlorothiazide 12.5 MG TABS tablet [Pharmacy Med Name: HydroCHLOROthiazide Oral Tablet 12.5 MG] 90 tablet 1    Last Written Prescription Date:  11-13-17  Last Fill Quantity: 90,  # refills: 2   Last office visit: 3/20/2018 with prescribing provider:  3-20-18   Future Office Visit:     Sig: TAKE ONE TABLET BY MOUTH ONE TIME DAILY    Diuretics (Including Combos) Protocol Passed    7/23/2018  7:00 AM       Passed - Blood pressure under 140/90 in past 12 months    BP Readings from Last 3 Encounters:   03/20/18 124/70   02/26/18 130/72   01/12/18 134/65                Passed - Recent (12 mo) or future (30 days) visit within the authorizing provider's specialty    Patient had office visit in the last 12 months or has a visit in the next 30 days with authorizing provider or within the authorizing provider's specialty.  See \"Patient Info\" tab in inbasket, or \"Choose Columns\" in Meds & Orders section of the refill encounter.           Passed - Patient is age 18 or older       Passed - Normal serum creatinine on file in past 12 months    Recent Labs   Lab Test  11/22/17   1425   CR  0.77             Passed - Normal serum potassium on file in past 12 months    Recent Labs   Lab Test  11/22/17   1425   POTASSIUM  3.6                   Passed - Normal serum sodium on file in past 12 months    Recent Labs   Lab Test  11/22/17   1425   NA  142                "

## 2018-07-24 RX ORDER — HYDROCHLOROTHIAZIDE 12.5 MG/1
TABLET ORAL
Qty: 90 TABLET | Refills: 0 | Status: SHIPPED | OUTPATIENT
Start: 2018-07-24 | End: 2022-04-27

## 2018-07-24 NOTE — TELEPHONE ENCOUNTER
Prescription approved per [facility] Refill Protocol.    Ivette Moya RN  [facility]

## 2021-11-23 NOTE — TELEPHONE ENCOUNTER
Forms received from:  [facility]   Phone number listed: [phone]   Fax listed: [phone]  Date received: 1-22-18  Form description: [facility]  Once forms are completed, please return to [place] via fax.  Is patient requesting to be contacted when forms are completed: n/a  Form placed: RN folder  Isatu Garcia    Form given to RN to review med list.  Order pended for provider to sign.   Scheduled remote transmission. See scanned docments.  Chargeable visit

## 2022-02-05 ENCOUNTER — NURSE TRIAGE (OUTPATIENT)
Dept: NURSING | Facility: CLINIC | Age: 87
End: 2022-02-05
Payer: COMMERCIAL

## 2022-02-05 NOTE — TELEPHONE ENCOUNTER
Pt's daughter, Lamonte, calling for advice.  Aburto assisted with interpretation.      Aburto reports pt tested positive for COVID-19 today via an at-home test.  Pt has a sore throat, fever (99.6F axillary), and cough.  Pt's oxygen saturation on r/a is 90%.      Aburto denies SOB, chest pain.  Pt is alert and oriented, can stand and walk by himself.      Triage disposition:  Call PCP now or virtual visit.  No PCP within [facility] (pt has not been seen since 2018).  Pt not active on [facility] for an E-visit and there are no virtual visits left with UC for tonight so RN advised in-person UC. She verbalized understanding and had no further questions.     COVID 19 Nurse Triage Plan/Patient Instructions    Please be aware that novel coronavirus (COVID-19) may be circulating in the community. If you develop symptoms such as fever, cough, or SOB or if you have concerns about the presence of another infection including coronavirus (COVID-19), please contact your health care provider or visit [email or web address].     Disposition/Instructions    In-Person Visit with provider recommended. Reference Visit Selection Guide.    Thank you for taking steps to prevent the spread of this virus.  o Limit your contact with others.  o Wear a simple mask to cover your cough.  o Wash your hands well and often.    Resources    [facility]: About COVID-19: [email or web address]    CDC: What to Do If You're Sick: www.cdc.gov/coronavirus/2019-ncov/about/steps-when-sick.html    CDC: Ending Home Isolation: www.cdc.gov/coronavirus/2019-ncov/hcp/disposition-in-home-patients.html     CDC: Caring for Someone: www.cdc.gov/coronavirus/2019-ncov/if-you-are-sick/care-for-someone.html     [facility]: Interim Guidance for Hospital Discharge to Home: www.health.[facility].mn.us/diseases/coronavirus/hcp/hospdischarge.pdf    [facility] clinical trials (COVID-19 research studies): clinicalaffairs.[facility]/n-clinical-trials     Below are the COVID-19  hotlines at the [place] Department of Health ([facility]). Interpreters are available.   o For health questions: Call [phone] or 1-795.958.5239 (7 a.m. to 7 p.m.)  o For questions about schools and childcare: Call [phone] or 1-718.390.3648 (7 a.m. to 7 p.m.)               Lizz Butler RN  [facility] Nurse Advisor            Reason for Disposition    HIGH RISK for severe COVID complications (e.g., age > 64 years, obesity with BMI > 25, pregnant, chronic lung disease or other chronic medical condition)  (Exception: Already seen by PCP and no new or worsening symptoms.)    Additional Information    Negative: SEVERE difficulty breathing (e.g., struggling for each breath, speaks in single words)    Negative: Difficult to awaken or acting confused (e.g., disoriented, slurred speech)    Negative: Shock suspected (e.g., cold/pale/clammy skin, too weak to stand, low BP, rapid pulse)    Negative: Bluish (or gray) lips or face now    Negative: Sounds like a life-threatening emergency to the triager    Negative: MODERATE difficulty breathing (e.g., speaks in phrases, SOB even at rest, pulse 100-120)    Negative: MILD difficulty breathing (e.g., minimal/no SOB at rest, SOB with walking, pulse <100)    Negative: SEVERE or constant chest pain or pressure (Exception: mild central chest pain, present only when coughing)    Negative: [1] Headache AND [2] stiff neck (can't touch chin to chest)    Negative: Chest pain or pressure    Negative: Patient sounds very sick or weak to the triager    Negative: Fever > 103 F (39.4 C)    Negative: [1] Fever > 101 F (38.3 C) AND [2] age > 60 years    Negative: [1] Fever > 100.0 F (37.8 C) AND [2] bedridden (e.g., nursing home patient, CVA, chronic illness, recovering from surgery)    Protocols used: CORONAVIRUS (COVID-19) DIAGNOSED OR [id number] 8.25.2021

## 2022-02-13 ENCOUNTER — HOME INFUSION (PRE-WILLOW HOME INFUSION) (OUTPATIENT)
Dept: PHARMACY | Facility: CLINIC | Age: 87
End: 2022-02-13
Payer: COMMERCIAL

## 2022-03-14 NOTE — PROGRESS NOTES
This is a recent snapshot of the patient's [facility] medical record.  For current drug dose and complete information and questions, call [phone] or In Basket pool, fv home infusion ([id number])  CSN Number:  [id number]

## 2022-04-14 ENCOUNTER — TELEPHONE (OUTPATIENT)
Dept: FAMILY MEDICINE | Facility: CLINIC | Age: 87
End: 2022-04-14
Payer: COMMERCIAL

## 2022-04-14 NOTE — TELEPHONE ENCOUNTER
Reason for Call:  Discuss referral to care coordinator    Detailed comments: Please call anthony Padron    Phone Number Patient can be reached at: Other phone number:  [phone]    Best Time: any    Can we leave a detailed message on this number? YES    Call taken on 4/14/2022 at 2:55 PM by Erica Humphreys

## 2022-04-14 NOTE — TELEPHONE ENCOUNTER
I have not seen patient since 2018  From chart appears patient now sees an internal medicine doctor at [facility]  All referrals should go through them    Please inform family    Arie Dahl MD

## 2022-04-26 ENCOUNTER — TRANSFERRED RECORDS (OUTPATIENT)
Dept: FAMILY MEDICINE | Facility: CLINIC | Age: 87
End: 2022-04-26

## 2022-04-27 ENCOUNTER — OFFICE VISIT (OUTPATIENT)
Dept: FAMILY MEDICINE | Facility: CLINIC | Age: 87
End: 2022-04-27
Payer: COMMERCIAL

## 2022-04-27 VITALS
HEIGHT: 60 IN | SYSTOLIC BLOOD PRESSURE: 137 MMHG | DIASTOLIC BLOOD PRESSURE: 88 MMHG | BODY MASS INDEX: 25.16 KG/M2 | TEMPERATURE: 97 F | HEART RATE: 78 BPM | WEIGHT: 128.13 LBS

## 2022-04-27 DIAGNOSIS — F41.9 ANXIETY: ICD-10-CM

## 2022-04-27 DIAGNOSIS — Z00.00 ROUTINE GENERAL MEDICAL EXAMINATION AT A HEALTH CARE FACILITY: Primary | ICD-10-CM

## 2022-04-27 DIAGNOSIS — R53.83 FATIGUE, UNSPECIFIED TYPE: ICD-10-CM

## 2022-04-27 DIAGNOSIS — R45.4 ANGER: ICD-10-CM

## 2022-04-27 DIAGNOSIS — G47.00 INSOMNIA, UNSPECIFIED TYPE: ICD-10-CM

## 2022-04-27 DIAGNOSIS — R73.01 IMPAIRED FASTING GLUCOSE: ICD-10-CM

## 2022-04-27 DIAGNOSIS — E78.5 HYPERLIPIDEMIA WITH TARGET LDL LESS THAN 130: ICD-10-CM

## 2022-04-27 DIAGNOSIS — N40.1 BENIGN PROSTATIC HYPERPLASIA WITH LOWER URINARY TRACT SYMPTOMS, SYMPTOM DETAILS UNSPECIFIED: ICD-10-CM

## 2022-04-27 DIAGNOSIS — F43.10 PTSD (POST-TRAUMATIC STRESS DISORDER): ICD-10-CM

## 2022-04-27 DIAGNOSIS — F03.91 DEMENTIA WITH BEHAVIORAL DISTURBANCE, UNSPECIFIED DEMENTIA TYPE: ICD-10-CM

## 2022-04-27 LAB
ALBUMIN SERPL-MCNC: 3.6 G/DL (ref 3.4–5)
ALP SERPL-CCNC: 86 U/L (ref 40–150)
ALT SERPL W P-5'-P-CCNC: 21 U/L (ref 0–70)
ANION GAP SERPL CALCULATED.3IONS-SCNC: 6 MMOL/L (ref 3–14)
AST SERPL W P-5'-P-CCNC: 18 U/L (ref 0–45)
BASOPHILS # BLD AUTO: 0 10E3/UL (ref 0–0.2)
BASOPHILS NFR BLD AUTO: 0 %
BILIRUB SERPL-MCNC: 0.4 MG/DL (ref 0.2–1.3)
BUN SERPL-MCNC: 38 MG/DL (ref 7–30)
CALCIUM SERPL-MCNC: 9.1 MG/DL (ref 8.5–10.1)
CHLORIDE BLD-SCNC: 108 MMOL/L (ref 94–109)
CHOLEST SERPL-MCNC: 150 MG/DL
CO2 SERPL-SCNC: 26 MMOL/L (ref 20–32)
CREAT SERPL-MCNC: 0.91 MG/DL (ref 0.66–1.25)
EOSINOPHIL # BLD AUTO: 0.1 10E3/UL (ref 0–0.7)
EOSINOPHIL NFR BLD AUTO: 1 %
ERYTHROCYTE [DISTWIDTH] IN BLOOD BY AUTOMATED COUNT: 13.5 % (ref 10–15)
FASTING STATUS PATIENT QL REPORTED: NO
GFR SERPL CREATININE-BSD FRML MDRD: 80 ML/MIN/1.73M2
GLUCOSE BLD-MCNC: 137 MG/DL (ref 70–99)
HBA1C MFR BLD: 5.8 % (ref 0–5.6)
HCT VFR BLD AUTO: 40.9 % (ref 40–53)
HDLC SERPL-MCNC: 53 MG/DL
HGB BLD-MCNC: 13.8 G/DL (ref 13.3–17.7)
LDLC SERPL CALC-MCNC: 72 MG/DL
LYMPHOCYTES # BLD AUTO: 0.4 10E3/UL (ref 0.8–5.3)
LYMPHOCYTES NFR BLD AUTO: 8 %
MCH RBC QN AUTO: 32.2 PG (ref 26.5–33)
MCHC RBC AUTO-ENTMCNC: 33.7 G/DL (ref 31.5–36.5)
MCV RBC AUTO: 96 FL (ref 78–100)
MONOCYTES # BLD AUTO: 0.4 10E3/UL (ref 0–1.3)
MONOCYTES NFR BLD AUTO: 8 %
NEUTROPHILS # BLD AUTO: 4.3 10E3/UL (ref 1.6–8.3)
NEUTROPHILS NFR BLD AUTO: 83 %
NONHDLC SERPL-MCNC: 97 MG/DL
PLATELET # BLD AUTO: 237 10E3/UL (ref 150–450)
POTASSIUM BLD-SCNC: 4.4 MMOL/L (ref 3.4–5.3)
PROT SERPL-MCNC: 7.3 G/DL (ref 6.8–8.8)
RBC # BLD AUTO: 4.28 10E6/UL (ref 4.4–5.9)
SODIUM SERPL-SCNC: 140 MMOL/L (ref 133–144)
TRIGL SERPL-MCNC: 123 MG/DL
TSH SERPL DL<=0.005 MIU/L-ACNC: 0.83 MU/L (ref 0.4–4)
WBC # BLD AUTO: 5.2 10E3/UL (ref 4–11)

## 2022-04-27 PROCEDURE — 84443 ASSAY THYROID STIM HORMONE: CPT | Performed by: FAMILY MEDICINE

## 2022-04-27 PROCEDURE — 83036 HEMOGLOBIN GLYCOSYLATED A1C: CPT | Performed by: FAMILY MEDICINE

## 2022-04-27 PROCEDURE — 80061 LIPID PANEL: CPT | Performed by: FAMILY MEDICINE

## 2022-04-27 PROCEDURE — 99387 INIT PM E/M NEW PAT 65+ YRS: CPT | Performed by: FAMILY MEDICINE

## 2022-04-27 PROCEDURE — 99213 OFFICE O/P EST LOW 20 MIN: CPT | Mod: 25 | Performed by: FAMILY MEDICINE

## 2022-04-27 PROCEDURE — 80053 COMPREHEN METABOLIC PANEL: CPT | Performed by: FAMILY MEDICINE

## 2022-04-27 PROCEDURE — 36415 COLL VENOUS BLD VENIPUNCTURE: CPT | Performed by: FAMILY MEDICINE

## 2022-04-27 PROCEDURE — 85025 COMPLETE CBC W/AUTO DIFF WBC: CPT | Performed by: FAMILY MEDICINE

## 2022-04-27 RX ORDER — PRAZOSIN HYDROCHLORIDE 1 MG/1
1 CAPSULE ORAL DAILY
Qty: 90 CAPSULE | Refills: 3 | Status: SHIPPED | OUTPATIENT
Start: 2022-04-27 | End: 2023-06-12

## 2022-04-27 RX ORDER — TAMSULOSIN HYDROCHLORIDE 0.4 MG/1
0.4 CAPSULE ORAL DAILY
Qty: 90 CAPSULE | Refills: 3 | Status: SHIPPED | OUTPATIENT
Start: 2022-04-27 | End: 2023-05-24

## 2022-04-27 RX ORDER — PRAZOSIN HYDROCHLORIDE 1 MG/1
1 CAPSULE ORAL DAILY
COMMUNITY
Start: 2022-04-04 | End: 2022-04-27

## 2022-04-27 RX ORDER — QUETIAPINE FUMARATE 25 MG/1
25 TABLET, FILM COATED ORAL DAILY
COMMUNITY
End: 2022-04-27

## 2022-04-27 RX ORDER — MELATONIN 10 MG
10 CAPSULE ORAL
COMMUNITY

## 2022-04-27 RX ORDER — ATORVASTATIN CALCIUM 10 MG/1
10 TABLET, FILM COATED ORAL DAILY
Qty: 90 TABLET | Refills: 3 | Status: SHIPPED | OUTPATIENT
Start: 2022-04-27 | End: 2023-03-13

## 2022-04-27 RX ORDER — QUETIAPINE FUMARATE 25 MG/1
TABLET, FILM COATED ORAL
Qty: 180 TABLET | Refills: 3 | Status: SHIPPED | OUTPATIENT
Start: 2022-04-27 | End: 2023-04-21

## 2022-04-27 RX ORDER — TAMSULOSIN HYDROCHLORIDE 0.4 MG/1
0.4 CAPSULE ORAL DAILY
COMMUNITY
End: 2022-04-27

## 2022-04-27 ASSESSMENT — ENCOUNTER SYMPTOMS
MYALGIAS: 1
COUGH: 0
PALPITATIONS: 0
SHORTNESS OF BREATH: 0
ARTHRALGIAS: 1
DYSURIA: 0
PARESTHESIAS: 0
HEMATURIA: 0
DIARRHEA: 0
ABDOMINAL PAIN: 0
DIZZINESS: 1
NERVOUS/ANXIOUS: 1
WEAKNESS: 1
NAUSEA: 0
HEADACHES: 0
HEMATOCHEZIA: 0
FEVER: 0
FREQUENCY: 0
CONSTIPATION: 0
SORE THROAT: 0
HEARTBURN: 1
JOINT SWELLING: 0
EYE PAIN: 0
CHILLS: 1

## 2022-04-27 ASSESSMENT — ACTIVITIES OF DAILY LIVING (ADL)
CURRENT_FUNCTION: MEDICATION ADMINISTRATION REQUIRES ASSISTANCE
CURRENT_FUNCTION: LAUNDRY REQUIRES ASSISTANCE
CURRENT_FUNCTION: MONEY MANAGEMENT REQUIRES ASSISTANCE
CURRENT_FUNCTION: TRANSPORTATION REQUIRES ASSISTANCE
CURRENT_FUNCTION: PREPARING MEALS REQUIRES ASSISTANCE
CURRENT_FUNCTION: TELEPHONE REQUIRES ASSISTANCE
CURRENT_FUNCTION: BATHING REQUIRES ASSISTANCE
CURRENT_FUNCTION: SHOPPING REQUIRES ASSISTANCE

## 2022-04-27 ASSESSMENT — PAIN SCALES - GENERAL: PAINLEVEL: NO PAIN (0)

## 2022-04-27 NOTE — PATIENT INSTRUCTIONS
Call the the therapist at [facility]    Look around for other living options for patient    We will send you lab results

## 2022-04-27 NOTE — LETTER
April 29, 2022      Thu BOOM David  [address]   [place] [address]        Dear ,    We are writing to inform you of your test results.    These labs are all okay.  Nothing worrisome.     What is worrisome is the dementia/ anxiety / anger.       Pursue the counseling/ therapy and psychiatry as we discussed.            Resulted Orders   Hemoglobin A1c   Result Value Ref Range    Hemoglobin A1C 5.8 (H) 0.0 - 5.6 %      Comment:      Normal <5.7%   Prediabetes 5.7-6.4%    Diabetes 6.5% or higher     Note: Adopted from ADA consensus guidelines.   Comprehensive metabolic panel   Result Value Ref Range    Sodium 140 133 - 144 mmol/L    Potassium 4.4 3.4 - 5.3 mmol/L    Chloride 108 94 - 109 mmol/L    Carbon Dioxide (CO2) 26 20 - 32 mmol/L      Comment:      This result was previously suppressed from the chart.    Anion Gap 6 3 - 14 mmol/L      Comment:      This result was previously suppressed from the chart.    Urea Nitrogen 38 (H) 7 - 30 mg/dL      Comment:      This result was previously suppressed from the chart.    Creatinine 0.91 0.66 - 1.25 mg/dL      Comment:      This result was previously suppressed from the chart.    Calcium 9.1 8.5 - 10.1 mg/dL      Comment:      This result was previously suppressed from the chart.    Glucose 137 (H) 70 - 99 mg/dL      Comment:      This result was previously suppressed from the chart.    Alkaline Phosphatase 86 40 - 150 U/L      Comment:      This result was previously suppressed from the chart.    AST 18 0 - 45 U/L      Comment:      This result was previously suppressed from the chart.    ALT 21 0 - 70 U/L      Comment:      This result was previously suppressed from the chart.    Protein Total 7.3 6.8 - 8.8 g/dL      Comment:      This result was previously suppressed from the chart.    Albumin 3.6 3.4 - 5.0 g/dL      Comment:      This result was previously suppressed from the chart.    Bilirubin Total 0.4 0.2 - 1.3 mg/dL      Comment:      This result was  previously suppressed from the chart.    GFR Estimate 80 >60 mL/min/1.73m2      Comment:      Effective December 21, 2021 eGFRcr in adults is calculated using the 2021 CKD-EPI creatinine equation which includes age and gender (Fozia garcia al., NE, DOI: 10.1056/QKQUgg3562254)   This result was previously suppressed from the chart.   TSH with free T4 reflex   Result Value Ref Range    TSH 0.83 0.40 - 4.00 mU/L   Lipid panel reflex to direct LDL Non-fasting   Result Value Ref Range    Cholesterol 150 <200 mg/dL    Triglycerides 123 <150 mg/dL    Direct Measure HDL 53 >=40 mg/dL    LDL Cholesterol Calculated 72 <=100 mg/dL    Non HDL Cholesterol 97 <130 mg/dL    Patient Fasting > 8hrs? No     Narrative    Cholesterol  Desirable:  <200 mg/dL    Triglycerides  Normal:  Less than 150 mg/dL  Borderline High:  150-199 mg/dL  High:  200-499 mg/dL  Very High:  Greater than or equal to 500 mg/dL    Direct Measure HDL  Female:  Greater than or equal to 50 mg/dL   Male:  Greater than or equal to 40 mg/dL    LDL Cholesterol  Desirable:  <100mg/dL  Above Desirable:  100-129 mg/dL   Borderline High:  130-159 mg/dL   High:  160-189 mg/dL   Very High:  >= 190 mg/dL    Non HDL Cholesterol  Desirable:  130 mg/dL  Above Desirable:  130-159 mg/dL  Borderline High:  160-189 mg/dL  High:  190-219 mg/dL  Very High:  Greater than or equal to 220 mg/dL   CBC with platelets and differential   Result Value Ref Range    WBC Count 5.2 4.0 - 11.0 10e3/uL    RBC Count 4.28 (L) 4.40 - 5.90 10e6/uL    Hemoglobin 13.8 13.3 - 17.7 g/dL    Hematocrit 40.9 40.0 - 53.0 %    MCV 96 78 - 100 fL    MCH 32.2 26.5 - 33.0 pg    MCHC 33.7 31.5 - 36.5 g/dL    RDW 13.5 10.0 - 15.0 %    Platelet Count 237 150 - 450 10e3/uL    % Neutrophils 83 %    % Lymphocytes 8 %    % Monocytes 8 %    % Eosinophils 1 %    % Basophils 0 %    Absolute Neutrophils 4.3 1.6 - 8.3 10e3/uL    Absolute Lymphocytes 0.4 (L) 0.8 - 5.3 10e3/uL    Absolute Monocytes 0.4 0.0 - 1.3 10e3/uL     Absolute Eosinophils 0.1 0.0 - 0.7 10e3/uL    Absolute Basophils 0.0 0.0 - 0.2 10e3/uL       If you have any questions or concerns, please call the clinic at the number listed above.       Sincerely,      Arie Dahl MD/page

## 2022-04-27 NOTE — PROGRESS NOTES
"SUBJECTIVE:   Leona David is a 89 year old male who presents for Preventive Visit.       Patient has been advised of split billing requirements and indicates understanding: Yes  Are you in the first 12 months of your Medicare coverage?  No    Healthy Habits:     In general, how would you rate your overall health?  Fair    Frequency of exercise:  2-3 days/week    Duration of exercise:  Less than 15 minutes    Do you usually eat at least 4 servings of fruit and vegetables a day, include whole grains    & fiber and avoid regularly eating high fat or \"junk\" foods?  Yes    Taking medications regularly:  No    Barriers to taking medications:  Problems remembering to take them    Medication side effects:  None    Ability to successfully perform activities of daily living:  Telephone requires assistance, transportation requires assistance, shopping requires assistance, preparing meals requires assistance, bathing requires assistance, laundry requires assistance, medication administration requires assistance and money management requires assistance    Home Safety:  Lack of grab bars in the bathroom    Hearing Impairment:  Need to ask people to speak up or repeat themselves, difficulty understanding soft or whispered speech and difficulty understanding speech on the telephone    In the past 6 months, have you been bothered by leaking of urine? Yes    In general, how would you rate your overall mental or emotional health?  Fair      PHQ-2 Total Score: 2    Additional concerns today:  Yes    Do you feel safe in your environment? Yes    Have you ever done Advance Care Planning? (For example, a Health Directive, POLST, or a discussion with a medical provider or your loved ones about your wishes): No, advance care planning information given to patient to review.  Patient declined advance care planning discussion at this time.       Fall risk       Cognitive Screening Not appropriate due to known dementia    Do you have sleep apnea, " excessive snoring or daytime drowsiness?: no    Reviewed and updated as needed this visit by clinical staff   Tobacco  Allergies  Meds   Med Hx  Surg Hx  Fam Hx  Soc Hx          Reviewed and updated as needed this visit by Provider                   Social History     Tobacco Use     Smoking status: Former Smoker     Quit date: 9/3/1994     Years since quittin.6     Smokeless tobacco: Never Used   Substance Use Topics     Alcohol use: Yes     Comment: wine     If you drink alcohol do you typically have >3 drinks per day or >7 drinks per week? No    Alcohol Use 2022   Prescreen: >3 drinks/day or >7 drinks/week? No   Prescreen: >3 drinks/day or >7 drinks/week? -   No flowsheet data found.        Patient was in memory care but decided he wanted to be home.  Patient does have some anger and control issues  The family is trying to get him a [place]ian thereapist      Current providers sharing in care for this patient include:    Patient Care Team:  Arie aDhl MD as PCP - General (Family Practice)  Arie Dahl MD (Family Practice)    The following health maintenance items are reviewed in Epic and correct as of today:  Health Maintenance Due   Topic Date Due     ANNUAL REVIEW OF HM ORDERS  Never done     ZOSTER IMMUNIZATION (2 of 3) 2009     BMP  2018          Review of Systems   Constitutional: Positive for chills. Negative for fever.   HENT: Positive for congestion and hearing loss. Negative for ear pain and sore throat.    Eyes: Positive for visual disturbance. Negative for pain.   Respiratory: Negative for cough and shortness of breath.    Cardiovascular: Negative for chest pain, palpitations and peripheral edema.   Gastrointestinal: Positive for heartburn. Negative for abdominal pain, constipation, diarrhea, hematochezia and nausea.   Genitourinary: Negative for dysuria, frequency, genital sores, hematuria, impotence, penile discharge and urgency.   Musculoskeletal: Positive  for arthralgias and myalgias. Negative for joint swelling.   Skin: Negative for rash.   Neurological: Positive for dizziness and weakness. Negative for headaches and paresthesias.   Psychiatric/Behavioral: Positive for mood changes. The patient is nervous/anxious.      Was in memory care for 6 months but left  Was in [place]    Patient wanted to walk to airport there    Patient did not want to be in memory care    Back to [place] in Nov 2021    Has a lot of issues per daughter    Memory getting worse    Bad paranoia    Sees enemies everywhere    Anxiety   Anger    Living with daughter    Two daughters take care of him    Patient's wife is in [place] but not living in same place    They see each other on weekend, just for a few hours during day    Some walking    Works in garden, this is favorite thing to do    Just saw eye doctor     Got covid but did not have to be hospitalized    Patient used to be mellow    Not anymore    One child in [place]  One in [place]  One in [place]  Two here    Patient has issues with his kids' spouses and grandkids        OBJECTIVE:   /88 (BP Location: Right arm, Patient Position: Chair, Cuff Size: Adult Regular)   Pulse 78   Temp 97  F (36.1  C) (Temporal)   Ht 1.524 m (5')   Wt 58.1 kg (128 lb 2 oz)   BMI 25.02 kg/m   Estimated body mass index is 25.02 kg/m  as calculated from the following:    Height as of this encounter: 1.524 m (5').    Weight as of this encounter: 58.1 kg (128 lb 2 oz).  Physical Exam  GENERAL: healthy, alert and no distress  EYES: Eyes grossly normal to inspection, PERRL and conjunctivae and sclerae normal  HENT: ear canals and TM's normal, nose and mouth without ulcers or lesions  NECK: no adenopathy, no asymmetry, masses, or scars and thyroid normal to palpation  RESP: lungs clear to auscultation - no rales, rhonchi or wheezes  CV: regular rate and rhythm, normal S1 S2, no S3 or S4, no murmur, click or rub, no peripheral edema and peripheral pulses  strong  ABDOMEN: soft, nontender, no hepatosplenomegaly, no masses and bowel sounds normal  MS: no gross musculoskeletal defects noted, no edema  SKIN: no suspicious lesions or rashes  NEURO: Normal strength and tone, mentation intact and speech normal  PSYCH: mentation appears normal, affect normal/bright    Diagnostic Test Results:  Labs reviewed in Epic    ASSESSMENT / PLAN:   Thu was seen today for wellness visit.    Diagnoses and all orders for this visit:    Routine general medical examination at a health care facility    Hyperlipidemia with target LDL less than 130  -     atorvastatin (LIPITOR) 10 MG tablet; Take 1 tablet (10 mg) by mouth daily  -     Lipid panel reflex to direct LDL Non-fasting; Future  -     Comprehensive metabolic panel; Future  -     Comprehensive metabolic panel  -     Lipid panel reflex to direct LDL Non-fasting    Dementia with behavioral disturbance, unspecified dementia type (H)  -     QUEtiapine (SEROQUEL) 25 MG tablet; 1 at bedtime, but may add a 2nd pill as needed  -     Adult Mental Health  Referral; Future    Benign prostatic hyperplasia with lower urinary tract symptoms, symptom details unspecified  -     prazosin (MINIPRESS) 1 MG capsule; Take 1 capsule (1 mg) by mouth daily  -     tamsulosin (FLOMAX) 0.4 MG capsule; Take 1 capsule (0.4 mg) by mouth daily    Insomnia, unspecified type  -     QUEtiapine (SEROQUEL) 25 MG tablet; 1 at bedtime, but may add a 2nd pill as needed    Impaired fasting glucose  -     metFORMIN (GLUCOPHAGE) 500 MG tablet; Take 1 tablet (500 mg) by mouth daily (with breakfast)  -     Hemoglobin A1c; Future  -     Hemoglobin A1c    Fatigue, unspecified type  -     TSH with free T4 reflex; Future  -     CBC with Platelets & Differential; Future  -     CBC with Platelets & Differential  -     TSH with free T4 reflex    PTSD (post-traumatic stress disorder)  -     Adult Mental Health  Referral; Future    Anger  -     Adult Mental Health   Referral; Future    Anxiety  -     Adult Mental Health  Referral; Future    discussed multiple issues with patient and daughter  Given patient's cognitive status, most verbal interaction was with daughter   Did refill the diabetes, cholesterol, and bph type meds  Check labs  Did refill seroquel also  Very concerning with his worsening dementia/ anxiety/ anger etc  Discussed in detail  Family may need to look at other living / housing options  I did do referral for mental health.  They have a therapist in mind at [facility] in [facility] that speaks Spanish.  Patient will likely need psychiatry through [facility] also    Patient has been advised of split billing requirements and indicates understanding: Yes    COUNSELING:  Reviewed preventive health counseling, as reflected in patient instructions       Regular exercise       Healthy diet/nutrition       Vision screening    Estimated body mass index is 25.02 kg/m  as calculated from the following:    Height as of this encounter: 1.524 m (5').    Weight as of this encounter: 58.1 kg (128 lb 2 oz).        He reports that he quit smoking about 27 years ago. He has never used smokeless tobacco.      Appropriate preventive services were discussed with this patient, including applicable screening as appropriate for cardiovascular disease, diabetes, osteopenia/osteoporosis, and glaucoma.  As appropriate for age/gender, discussed screening for colorectal cancer, prostate cancer, breast cancer, and cervical cancer. Checklist reviewing preventive services available has been given to the patient.    Reviewed patients plan of care and provided an AVS. The Basic Care Plan (routine screening as documented in Health Maintenance) for Thu meets the Care Plan requirement. This Care Plan has been established and reviewed with the Patient and daughter.    Counseling Resources:  ATP IV Guidelines  Pooled Cohorts Equation Calculator  Breast Cancer Risk  Calculator  Breast Cancer: Medication to Reduce Risk  FRAX Risk Assessment  ICSI Preventive Guidelines  Dietary Guidelines for Americans, 2010  [employer]'s MyPlate  ASA Prophylaxis  Lung CA Screening    Arie Dahl MD  [facility]    Identified Health Risks:

## 2022-04-28 NOTE — RESULT ENCOUNTER NOTE
These labs are all okay.  Nothing worrisome.    What is worrisome is the dementia/ anxiety / anger.      Pursue the counseling/ therapy and psychiatry as we discussed.      Arie Dahl MD

## 2022-05-02 ENCOUNTER — TELEPHONE (OUTPATIENT)
Dept: FAMILY MEDICINE | Facility: CLINIC | Age: 87
End: 2022-05-02
Payer: COMMERCIAL

## 2022-05-02 NOTE — TELEPHONE ENCOUNTER
Patient's daughter, Lamonte, calling, consent to communicate on file. Lamonte states that patient has been experiencing increasing paranoia and they had to cancel an upcoming trip to [place] as patient would be unable to tolerate the plane ride. Aburto is requesting a letter from Dr. Dahl to the air line stating patient is unable fly at this time. She is requesting letter be emailed to her at [email or web address]. Please advise.     Starr Carcamo RN   [facility]

## 2022-05-02 NOTE — LETTER
[employer]  [address] [address]  [phone]            May 2, 2022    To whom it may concern:    Leona David (  [birth year] ) was just seen here at our clinic.  He has multiple medical and mental issues and is not stable.  He should not be travelling.              Sincerely,                    Arie Dahl MD

## 2022-05-02 NOTE — TELEPHONE ENCOUNTER
I agree patient should not travel. I did letter.  I put a signed copy in the team basket.  Please email this to family.    Thanks    Arie Dahl MD

## 2022-05-09 ENCOUNTER — PATIENT OUTREACH (OUTPATIENT)
Dept: GERIATRIC MEDICINE | Facility: CLINIC | Age: 87
End: 2022-05-09

## 2022-05-09 NOTE — LETTER
May 9, 2022    THU X VU  [address]  [place] [address]      Dear Thu:    As a member of [facility] ([facility] ([facility]), you are provided a care coordinator. I will be your new care coordinator as of 05/01/2022. I will be calling you soon to see how you are doing and determine your needs.    If you have any questions, please feel free to call me at  . If you reach my voice mail, please leave a message and your phone number. If you are hearing impaired, please call the [place] Relay at 965 or [phone] ([email or web address]-[email or web address] relay service).    I look forward to speaking with you soon.    Sincerely,    LORNA Mackey, [facility]    E-mail: [email or web address]  Phone: [phone]      [facility] is a health plan that contracts with both Medicare and the [place] Medical Assistance (Medicaid) program to provide benefits of both programs to enrollees. Enrollment in [facility] depends on contract renewal.      [facility]  [id number] DHS Approved ([id number])  [id number] (11/18)

## 2022-05-10 ENCOUNTER — PATIENT OUTREACH (OUTPATIENT)
Dept: GERIATRIC MEDICINE | Facility: CLINIC | Age: 87
End: 2022-05-10
Payer: COMMERCIAL

## 2022-05-10 NOTE — PROGRESS NOTES
[facility] Care Coordination Contact    Called adult daughter Lamonte Edwards to schedule annual HRA home visit. HRA has been scheduled for 5/17/22.     Corrie ROTHMAN, [facility] Care Coordinator   Telephone: [phone]  Fax: [phone]

## 2022-05-17 ENCOUNTER — PATIENT OUTREACH (OUTPATIENT)
Dept: GERIATRIC MEDICINE | Facility: CLINIC | Age: 87
End: 2022-05-17
Payer: COMMERCIAL

## 2022-05-17 SDOH — HEALTH STABILITY: PHYSICAL HEALTH: ON AVERAGE, HOW MANY MINUTES DO YOU ENGAGE IN EXERCISE AT THIS LEVEL?: 10 MIN

## 2022-05-17 SDOH — ECONOMIC STABILITY: FOOD INSECURITY: WITHIN THE PAST 12 MONTHS, YOU WORRIED THAT YOUR FOOD WOULD RUN OUT BEFORE YOU GOT MONEY TO BUY MORE.: NEVER TRUE

## 2022-05-17 SDOH — HEALTH STABILITY: PHYSICAL HEALTH: ON AVERAGE, HOW MANY DAYS PER WEEK DO YOU ENGAGE IN MODERATE TO STRENUOUS EXERCISE (LIKE A BRISK WALK)?: 1 DAY

## 2022-05-17 SDOH — ECONOMIC STABILITY: TRANSPORTATION INSECURITY
IN THE PAST 12 MONTHS, HAS LACK OF TRANSPORTATION KEPT YOU FROM MEETINGS, WORK, OR FROM GETTING THINGS NEEDED FOR DAILY LIVING?: NO

## 2022-05-17 SDOH — ECONOMIC STABILITY: FOOD INSECURITY: WITHIN THE PAST 12 MONTHS, THE FOOD YOU BOUGHT JUST DIDN'T LAST AND YOU DIDN'T HAVE MONEY TO GET MORE.: NEVER TRUE

## 2022-05-17 SDOH — ECONOMIC STABILITY: TRANSPORTATION INSECURITY
IN THE PAST 12 MONTHS, HAS THE LACK OF TRANSPORTATION KEPT YOU FROM MEDICAL APPOINTMENTS OR FROM GETTING MEDICATIONS?: NO

## 2022-05-17 SDOH — ECONOMIC STABILITY: INCOME INSECURITY: IN THE LAST 12 MONTHS, WAS THERE A TIME WHEN YOU WERE NOT ABLE TO PAY THE MORTGAGE OR RENT ON TIME?: NO

## 2022-05-17 ASSESSMENT — LIFESTYLE VARIABLES
HOW MANY STANDARD DRINKS CONTAINING ALCOHOL DO YOU HAVE ON A TYPICAL DAY: PATIENT DOES NOT DRINK
AUDIT-C TOTAL SCORE: 0
SKIP TO QUESTIONS 9-10: 1
HOW OFTEN DO YOU HAVE A DRINK CONTAINING ALCOHOL: NEVER
HOW OFTEN DO YOU HAVE SIX OR MORE DRINKS ON ONE OCCASION: NEVER

## 2022-05-20 ENCOUNTER — PATIENT OUTREACH (OUTPATIENT)
Dept: GERIATRIC MEDICINE | Facility: CLINIC | Age: 87
End: 2022-05-20
Payer: COMMERCIAL

## 2022-05-23 ENCOUNTER — MEDICAL CORRESPONDENCE (OUTPATIENT)
Dept: HEALTH INFORMATION MANAGEMENT | Facility: CLINIC | Age: 87
End: 2022-05-23
Payer: COMMERCIAL

## 2022-05-23 ENCOUNTER — PATIENT OUTREACH (OUTPATIENT)
Dept: GERIATRIC MEDICINE | Facility: CLINIC | Age: 87
End: 2022-05-23
Payer: COMMERCIAL

## 2022-05-23 DIAGNOSIS — F03.91 DEMENTIA WITH BEHAVIORAL DISTURBANCE, UNSPECIFIED DEMENTIA TYPE: Primary | ICD-10-CM

## 2022-05-23 NOTE — PROGRESS NOTES
[facility] Care Coordination Contact  Dr Dahl-    I am the  Partners care coordinator for Leona David. His daughter's have requested I reach out to you to see if Thu could receive skilled nurse visits for med management and a PT/OT assessment?    I am working on setting up some services for him at home to help his caregivers manage his cares. If you agree for home care services, please send Epic orders to [facility].    Thank you,  Corrie ROTHMAN, [facility] Care Coordinator   Telephone: [phone]  Fax: [phone]

## 2022-05-24 NOTE — PROGRESS NOTES
[facility] Care Coordination Contact-5/20/22    Called [facility] to see if Krystina Morgan, South Korean therapist was taking new patients and he is not. Need to keep calling to inquire about openings.  Talked to Lamonte, daughter. Let her know Krystina Morgan is not taking new patients at this time. Gave her [facility] resources. She will call and see if any have South Korean therapists for her dad.    Corrie ROTHMAN, [facility] Care Coordinator   Telephone: [phone]  Fax: [phone]

## 2022-05-24 NOTE — PROGRESS NOTES
[facility] Care Coordination Contact-5/23/22    Called [facility] and ordered Prevail pull ups, shower chair with back.    Corrie ROTHMAN, [facility] Care Coordinator   Telephone: [phone]  Fax: [phone]    [facility] Care Coordination Contact-5/24    Nereida, daughter, emailed me the 8055 form.   Faxed [facility] the 8417 and 1338 forms requesting to open member to Elderly Waiver.    Corrie ROTHMAN, [facility] Care Coordinator   Telephone: [phone]  Fax: [phone]

## 2022-05-24 NOTE — PROGRESS NOTES
[facility] Care Coordination Contact    [facility] Initial Assessment     telephone visit for Initial Health Risk Assessment with Leona David completed on May 17, 2022    Type of residence:: Private home - stairs  Current living arrangement:: I live in a private home with family     Assessment completed with:: Patient, Daughters; Nereida David and Lamonte Caoor    Current Care Plan  Member currently receiving the following home care services:     Member currently receiving the following community resources: [facility] Programs, County Worker      Medication Review  Medication reconciliation completed in Epic: Yes  Medication set-up & administration: Family/informal caregiver sets up weekly.  Family caregiver administers medications.  Medication Risk Assessment Medication (1 or more, place referral to MTM): Lacks understanding of medication regimen  MTM Referral Placed: No:     Mental/Behavioral Health   Depression Screening:   PHQ-2 Total Score (Adult) - Positive if 3 or more points; Administer PHQ-9 if positive: 2       Mental health DX:: Yes   Mental health DX how managed:: None    No current MH services-will place referral for Outpatient counseling    Falls Assessment:   Fallen 2 or more times in the past year?: No   Any fall with injury in the past year?: No    ADL/IADL Dependencies:   Dependent ADLs:: Ambulation-cane, Dressing, Grooming, Eating, Positioning, Toileting, Wheelchair-with assist, Incontinence  Dependent IADLs:: Laundry, Cleaning, Cooking, Shopping, Money Management, Medication Management, Meal Preparation, Transportation, Incontinence    [facility] sponsored benefits: Shared information re: Silver Sneakers/gym memberships, ASA, Calcium +D.    PCA Assessment completed at visit: Yes Initial PCA Assessment indicated 32 units per day of PCA.     Elderly Waiver Eligibility: Yes-will open to EW    Care Plan & Recommendations: Will open to EW so homemaking, meals on wheels, grab bars, ensure, bed  railing can be ordered. Will also refer for PCA, nursing,PT/OT, pull ups, hearing aid batteries, shower chair, mental health therapist    See LTCC for detailed assessment information.    Follow-Up Plan: Member informed of future contact, plan to f/u with member with a 6 month telephone assessment.  Contact information shared with member and family, encouraged member to call with any questions or concerns at any time.    [place] care continuum providers: Please see Snapshot and Care Management Flowsheets for Specific details of care plan.    This CC note routed to PCP.     Corrie ROTHMAN, [facility] Care Coordinator   Telephone: [phone]  Fax: [phone]

## 2022-05-25 ENCOUNTER — PATIENT OUTREACH (OUTPATIENT)
Dept: GERIATRIC MEDICINE | Facility: CLINIC | Age: 87
End: 2022-05-25
Payer: COMMERCIAL

## 2022-05-25 ENCOUNTER — TELEPHONE (OUTPATIENT)
Dept: FAMILY MEDICINE | Facility: CLINIC | Age: 87
End: 2022-05-25
Payer: COMMERCIAL

## 2022-05-25 DIAGNOSIS — H90.3 SENSORINEURAL HEARING LOSS, BILATERAL: Primary | ICD-10-CM

## 2022-05-25 NOTE — TELEPHONE ENCOUNTER
Patient's daughter, Aburto calling (signed consent to communicate PHI on file)    She is requesting a prescription for hearing aid batteries.  Per 5/25/2022 patient outreach encounter from [facility] Care Coordinator, she called [facility] and was told PCP needs to write a Rx and send it to the drug store.  Daughter would like prescription sent to [facility] in [place].    She is also checking on status request for HC referral.  Please see 5/23/2022 patient outreach encounter from [facility] CC.    Holly Barros RN  [facility]

## 2022-05-25 NOTE — PROGRESS NOTES
"[facility] Care Coordination Contact    Called Ema at [place] [employer] - [phone] (Meals on Wheels)  They have 6 options for \"Asian Inspired\" meals. They are frozen and delivered 1x/week on Friday's.    Same meals each week.       Called Nereida with the meal options. She said they will give them a try.   She asked if I know if the hearing aid batteries for her dad's hearing aides are covered by insurance.    Called [facility]. Was told PCP needs to write [facility]. Then show insurance card when they go to pick them up.     Sent text to Nereida about how to get the batteries.    Corrie ROTHMAN, [facility] Care Coordinator   Telephone: [phone]  Fax: [phone]  "

## 2022-05-26 ENCOUNTER — PATIENT OUTREACH (OUTPATIENT)
Dept: GERIATRIC MEDICINE | Facility: CLINIC | Age: 87
End: 2022-05-26
Payer: COMMERCIAL

## 2022-05-26 RX ORDER — HEARING AID ACCESSORY
MISCELLANEOUS MISCELLANEOUS
Qty: 2 EACH | Refills: 11 | Status: SHIPPED | OUTPATIENT
Start: 2022-05-26

## 2022-05-27 ENCOUNTER — PATIENT OUTREACH (OUTPATIENT)
Dept: GERIATRIC MEDICINE | Facility: CLINIC | Age: 87
End: 2022-05-27
Payer: COMMERCIAL

## 2022-05-27 ENCOUNTER — TELEPHONE (OUTPATIENT)
Dept: FAMILY MEDICINE | Facility: CLINIC | Age: 87
End: 2022-05-27
Payer: COMMERCIAL

## 2022-05-27 NOTE — PROGRESS NOTES
[facility] Care Coordination Contact    Received text from Nereida with a provider's name-[facility], asked me to call them as they might have Armenian PCA's.    She also emailed me a list of providers that list [place] mental health providers and asked me to see if they take new patients. I replied that she could go ahead and call as I don't know their schedules in case an appt can be made.     Corrie ROTHMAN, [facility] Care Coordinator   Telephone: [phone]  Fax: [phone]

## 2022-05-27 NOTE — PROGRESS NOTES
[facility] Care Coordination Contact    Called [facility]-[phone]. Spoke with Byron. They are in process of recruiting Serbian male PCA's and took my info for when they have someone hired.  Made referral for [facility].  Texted Nereida and let her know above.    Corrie ROTHMAN, [facility] Care Coordinator   Telephone: [phone]  Fax: [phone]

## 2022-05-27 NOTE — TELEPHONE ENCOUNTER
Adore with [facility] home care called the clinic to update Dr. Dahl that the home care referral was receive and home care will accept patient.

## 2022-05-28 ENCOUNTER — TELEPHONE (OUTPATIENT)
Dept: CARE COORDINATION | Facility: CLINIC | Age: 87
End: 2022-05-28
Payer: COMMERCIAL

## 2022-05-28 NOTE — TELEPHONE ENCOUNTER
[facility] now requests orders and shares plan of care/discharge summaries for some patients through [employer].  Please REPLY TO THIS MESSAGE OR ROUTE BACK TO THE AUTHOR in order to give authorization for orders when needed.  This is considered a verbal order, you will still receive a faxed copy of orders for signature.  Thank you for your assistance in improving collaboration for our patients.    ORDER    Request for SOC orders for SN 1w9 and 3prn for mental health support, home safety and pain mgmt. OT to evaluate and treat to include proper positioning, equipment needs ( grab bar placement) and caregiver education on behaviors related to lewy body dementia. PT to evaluate and treat to include home safety evaluation, proper transferring and develop HEP.        MICHELE Méndez, RN, [facility]  Office: [phone]  Fax: [phone]  [email or web address]

## 2022-05-31 ENCOUNTER — PATIENT OUTREACH (OUTPATIENT)
Dept: GERIATRIC MEDICINE | Facility: CLINIC | Age: 87
End: 2022-05-31
Payer: COMMERCIAL

## 2022-05-31 NOTE — PROGRESS NOTES
"[facility] Care Coordination Contact    I was notified by the admit nurse from [facility], Carmelo Nielsen RN that the daughter would like to request a toilet safety frame for Thu.   Carmelo saw him on Sat 5/28 to open to homecare.    Texted Nereida to ask if they are able to install the safety frame themselves or do they want [facility] to do it? I also asked how thick his mattress is for the bed railing.   Lamonte texted me back that the toilet is high so doesn't think he needs the safety frame and the mattress is foam and 5\", it lies on the frame, no box spring.   Texted back that Nereida requested the safety frame so Aburto will ask her about this. Told Lamonte the railing won't work with a 5\" foam mattress and no box spring.    Lamonte texted me that she and Nereida would like the toilet safety frame for their dad. They would like it installed.    Called [facility] and ordered the toilet safety frame.       Received email from CMS; from secure [facility] site:  THU GEO 10/20/1931  Non Contracted Auth Requests PCA: Initial Assessment PCA:   5/28/2022 4/30/2023 Medically Necessary [id number]  PCA auth member did not select an agency     Corrie ROTHMAN, JOVAN  [facility] Care Coordinator   Telephone: [phone]  Fax: [phone]  "

## 2022-06-01 ENCOUNTER — PATIENT OUTREACH (OUTPATIENT)
Dept: GERIATRIC MEDICINE | Facility: CLINIC | Age: 87
End: 2022-06-01
Payer: COMMERCIAL

## 2022-06-01 NOTE — PROGRESS NOTES
[facility] Care Coordination Contact    Called K[employer] and ordered vanilla ensure.    Corrie ROTHMAN, [facility] Care Coordinator   Telephone: [phone]  Fax: [phone]

## 2022-06-03 ENCOUNTER — NURSE TRIAGE (OUTPATIENT)
Dept: FAMILY MEDICINE | Facility: CLINIC | Age: 87
End: 2022-06-03
Payer: COMMERCIAL

## 2022-06-03 ENCOUNTER — TELEPHONE (OUTPATIENT)
Dept: FAMILY MEDICINE | Facility: CLINIC | Age: 87
End: 2022-06-03
Payer: COMMERCIAL

## 2022-06-03 NOTE — TELEPHONE ENCOUNTER
The Home Care/Assisted Living/Nursing Facility is calling regarding an established patient.  Has the patient seen Home Care in the past or is currently residing in Assisted Living or Nursing Facility? Yes.     Yahaira calling from [facility] requesting the following orders that are within the Home Care, Assisted Living or Nursing Home Eval and Treatment standing order and can be signed as standing order signature required by RN.    Preferred Call Back Number: [phone]    PT/OT/Speech Therapy OT 1x in the next 30 days to train with bed rail when it is received.     Verbal order provided.     Starr Carcamo RN   [facility]

## 2022-06-03 NOTE — TELEPHONE ENCOUNTER
Patient's daughter called reporting father is constipated. She was not with her father and was unable to answer triage questions, including frequency and date of last BM. Patient is scheduled for an appointment with PCP on Monday. Advised that patient could try Miralax over the weekend. Advised that if patient were to have constant abdominal pain, vomiting, or unable to have bowel movement before appointment he should be seen in ED or UC.    Yary Saleh RN

## 2022-06-06 ENCOUNTER — MEDICAL CORRESPONDENCE (OUTPATIENT)
Dept: HEALTH INFORMATION MANAGEMENT | Facility: CLINIC | Age: 87
End: 2022-06-06
Payer: COMMERCIAL

## 2022-06-08 ENCOUNTER — TELEPHONE (OUTPATIENT)
Dept: FAMILY MEDICINE | Facility: CLINIC | Age: 87
End: 2022-06-08
Payer: COMMERCIAL

## 2022-06-08 ENCOUNTER — PATIENT OUTREACH (OUTPATIENT)
Dept: GERIATRIC MEDICINE | Facility: CLINIC | Age: 87
End: 2022-06-08
Payer: COMMERCIAL

## 2022-06-08 DIAGNOSIS — Z53.9 DIAGNOSIS NOT YET DEFINED: Primary | ICD-10-CM

## 2022-06-08 PROCEDURE — G0180 MD CERTIFICATION HHA PATIENT: HCPCS | Performed by: FAMILY MEDICINE

## 2022-06-08 NOTE — PROGRESS NOTES
[facility] Care Coordination Contact    Received after visit chart from care coordinator.  Completed following tasks: Mailed copy of care plan to client, Updated services in Database, Submitted referrals/auths for MOW and Ensure, Mailed copy of POC signature sheet for member to sign and return in SASE , Mailed [facility] Safe Medication Disposal  and PCA signature sheet for member to sign and return.  Provider Signature - No POC Shared:  Member indicates that they do not want their POC shared with any EW providers.    [facility]:  Emailed completed PCA assessment to [facility] on 05/27/22 and mailed copy to member with POC. PCA agency TBD at this time.  Faxed MD Communication to PCP.     Kalee Fontanez  Care Management Specialist  [facility]  [phone]

## 2022-06-08 NOTE — LETTER
June 8, 2022      [facility]  [address]  [place] [address]      Dear Thu:    At [facility], we are dedicated to improving your health and well-being. Enclosed is the Comprehensive Care Plan that we developed with you on 05/17/2022. Please review the Care Plan carefully.    As a reminder, some of the things we discussed at your visit include:    Your physical and mental health    Ways to reduce falls    Health care needs you may have    Don t forget to contact your care coordinator if you:    Have been hospitalized or plan to be hospitalized     Have had a fall     Have experienced a change in physical health    Are experiencing emotional problems     If you do not agree with your Care Plan, have questions about it, or have experienced a change in your needs, please call me at [phone]. If you are hearing impaired, please call the [place] Relay at 450 or [phone] (yiksnv-rd-umadmj relay service).    Sincerely,    LORNA Mackey, [facility]    E-mail: [email or web address]  Phone: [phone]      [facility] ([facility]) is a health plan that contracts with both Medicare and the [place] Medical Assistance (Medicaid) program to provide benefits of both programs to enrollees. Enrollment in [facility] depends on contract renewal.    MSC+[id number])     [id number] (11/18)

## 2022-06-08 NOTE — TELEPHONE ENCOUNTER
Reason for Call:  Form, our goal is to have forms completed with 72 hours, however, some forms may require a visit or additional information.    Type of letter, form or note:  Home Health Certification    Who is the form from?: Home care    Where did the form come from: form was faxed in    What clinic location was the form placed at?: [facility]    Where the form was placed: Deal Box/Folder    What number is listed as a contact on the form?: fax [phone]       Additional comments: n/a    Call taken on 6/8/2022 at 11:26 AM by Lyudmila Barker

## 2022-06-09 ENCOUNTER — PATIENT OUTREACH (OUTPATIENT)
Dept: GERIATRIC MEDICINE | Facility: CLINIC | Age: 87
End: 2022-06-09
Payer: COMMERCIAL

## 2022-06-09 NOTE — PROGRESS NOTES
[facility] Care Coordination Contact    Received call from Lamonte. She would like to broaden the search for a PCA to include English speaking males. She also said the nurse was out to do her evaluation and doesn't feel they need a nurse to set up meds and put in his eye drops. The OT was out and thinks a suction cup type grab bar for the inside of the shower and another grab bar on the wall outside the shower would work. She also thinks a railing could be attached to the bed frame itself so will research this.  Lamonte said she is coming back next week. The PT still needs to come out.    Sent referrals to [facility]    Corrie ROTHMAN, [facility] Care Coordinator   Telephone: [phone]  Fax: [phone]

## 2022-06-09 NOTE — PROGRESS NOTES
[facility] Care Coordination Contact    Called [facility] as Aburto is in need of the pullups for Thu. Was told they are waiting for the RX to come back from PCP for the bathchair and then will send them together. Asked her to send out the pull ups separate as client needs them.     Was contacted back from [facility]. Asked for more info on Thu's needs and asked to contact Aburto. Texted Aburto for her permission and she agreed. Gave [facility]'s phone number.    Julio from [facility] replied that [place] is a difficult area to find help but will give my referral to the  and see what they come up with..    Corrie ROTHMAN, [facility] Care Coordinator   Telephone: [phone]  Fax: [phone]

## 2022-06-14 ENCOUNTER — PATIENT OUTREACH (OUTPATIENT)
Dept: GERIATRIC MEDICINE | Facility: CLINIC | Age: 87
End: 2022-06-14
Payer: COMMERCIAL

## 2022-06-14 NOTE — TELEPHONE ENCOUNTER
Please call Corrie to see if they have received all these.  I have signed several things for patient recently.    Thanks.    Arie Dahl MD

## 2022-06-15 ENCOUNTER — PATIENT OUTREACH (OUTPATIENT)
Dept: GERIATRIC MEDICINE | Facility: CLINIC | Age: 87
End: 2022-06-15
Payer: COMMERCIAL

## 2022-06-15 NOTE — PROGRESS NOTES
[facility] Care Coordination Contact-6/14/22    Received text from Aburto that her dad does not like the meals so I can cancel them. I asked if the diapers came yet and she said no.  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 Sounds like they do not have staff for him.    Corrie ROTHMAN, [facility] Care Coordinator   Telephone: [phone]  Fax: [phone]    [facility] Coordination Contact-6/15/22    Called MOW and canceled meals for Thu.    On Wed, Mo 15, 2022 at 12:58 PM Corrie Espino <[email or web address]> wrote:  Arian Prater-  Any luck finding someone for this one?     Corrie ROTHMAN,[facility]  Care Coordinator     [facility]    From: "[employer]. <[email or web address]   Sent: Wednesday, Bre 15, 2022 1:08 PM  To: Corrie Espino <[email or web address]>  Subject: Re: PCA      [EXTERNAL] This message comes from an external organization. Exercise caution when opening attachments or clicking links, especially from unknown senders.          Hi,  I talked to the daughter (Lamonte), but my worker he's not able for this client situation, I seguest to her if she can do some hours and we do hiring her, she's not able because she employed in the [employer].    If I get anything, I'll reply to you right away.    Thank you,    Moi    Corrie ROTHMAN, [facility] Care Coordinator   Telephone: [phone]  Fax: [phone]

## 2022-06-19 NOTE — PROGRESS NOTES
[facility] Care Coordination Contact    I had emailed Julio at [facility] on 6/8. He replied on 6/17 they don't have PCA staff in the [place] area.    6/17-I emailed Beatriz at [facility].    From: Beatriz Mcdonough <[email or web address]>   Sent: Friday, June 17, 2022 2:19 PM  To: Corrie Espino <[email or web address]>  Subject: RE: Client referral - [facility].       [EXTERNAL] This message comes from an external organization. Exercise caution when opening attachments or clicking links, especially from unknown senders.          Thank you for sending the PCA assessment for Thu Frankie and the additional information. I forwarded it to Trang, who does our PCA staffing. The staff is David Monahan and it s perfect timing!         Sincerely,    Beatriz Mcdonough (she/they)  Designated Coordinator  [facility]  [address] ? [place] [address]  Phone [phone] ?  Fax [phone]  [email or web address]    Corrie ROTHMAN, [facility] Care Coordinator   Telephone: [phone]  Fax: [phone]

## 2022-06-20 ENCOUNTER — PATIENT OUTREACH (OUTPATIENT)
Dept: GERIATRIC MEDICINE | Facility: CLINIC | Age: 87
End: 2022-06-20
Payer: COMMERCIAL

## 2022-06-20 NOTE — PROGRESS NOTES
[facility] Care Coordination Contact    Received a request to submit a DTR for the terminated of Meals. Documentation completed and faxed to the health plan. Care Coordinator aware.    Sangeeta Ojeda RN  Utilization   [facility]  [phone]

## 2022-06-21 NOTE — PROGRESS NOTES
[facility] Care Coordination Contact    From: Trang Sotomayor <[email or web address]>   Sent: Monday, June 20, 2022 4:02 PM  To: Corrie Espino <[email or web address]>  Subject: Referral      [EXTERNAL] This message comes from an external organization. Exercise caution when opening attachments or clicking links, especially from unknown senders.      Michelle Denton,     I had heard from a coworker that you were looking for PCA services for the client Leona David. I have a worker who is looking for full-time hours and would love to work with this client. He speaks English and is able to drive to the location. Please let me know if there are any special accommodations that need to be met, and let me know if you need anything to send in the service agreement.      Thank you!     Trang Sotomayor  New   [facility]  [address] ? [place] [address]  Phone [phone] ?  Fax [phone]  [email or web address]    From: Corrie Espino  Sent: Monday, June 20, 2022 4:36 PM  To: Trang Sotomayor  Subject: RE: Referral    Hi Trang-  Did I already send the assessment to Beatriz? Have you spoken with the daughter, Lamonte?    Corrie ROTHMAN,[facility]  Care Coordinator    [facility]      From: Trang Sotomayor <[email or web address]>   Sent: Monday, June 20, 2022 4:38 PM  To: Corrie Espino <[email or web address]>  Subject: RE: Referral       [EXTERNAL] This message comes from an external organization. Exercise caution when opening attachments or clicking links, especially from unknown senders.      I have the assessment, but I have not talked to the daughter. I will ask Beatriz if she has done so yet!    From: Corrie Espino  Sent: Monday, June 20, 2022 4:40 PM  To: Trang Sotomayor  Subject: RE: Referral    Ok, do you usually do a meet and greet first? I will send the referral into [facility] once I know everything is a go.   Thank you,  Corrie ROTHMAN,[facility]   Care Coordinator      From: Trang Sotomayor <[email or web address]>   Sent: Monday, June 20, 2022 4:43 PM  To: Corrie Espino <[email or web address]>  Subject: RE: Referral       [EXTERNAL] This message comes from an external organization. Exercise caution when opening attachments or clicking links, especially from unknown senders.          We can do a meet and greet, I m sure the worker would love to do that as well! I can give the daughter the information for the worker and I will have her reach out with her opinions of the worker, if that is okay with you?    From: Corrie Espino   Sent: Monday, June 20, 2022 4:54 PM  To: Trang Sotomayor <[email or web address]>  Subject: RE: Referral    Sure, sounds great!    Corrie ROTHMAN,[facility]  Care Coordinator    Corrie ROTHMAN, [facility] Care Coordinator   Telephone: [phone]  Fax: [phone]

## 2022-06-22 ENCOUNTER — PATIENT OUTREACH (OUTPATIENT)
Dept: GERIATRIC MEDICINE | Facility: CLINIC | Age: 87
End: 2022-06-22

## 2022-06-22 NOTE — PROGRESS NOTES
[facility] Care Coordination Contact    Called Aburto to check to see if the items I ordered arrived. She said the diapers and bath chair came.  I asked if the OT placed the grab bars and bed railing. She said the nurse was out yesterday and discharged her dad. The OT never came back; the PT is coming next week.    Aburto hasn't heard from the OT at all so doesn't know what's going on with her. She will ask the PT next week about it.    Aburto also hasn't heard from Trang at [facility] about the PCA. She will let me know if she doesn't hear anything by tomorrow.    Called [facility] to check on the toilet safety frame. They are waiting for auth from [facility].    Called [facility]. They did not see the referral that was sent 6/8. I emailed it again and was entered. Auth #[id number].    Corrie ROTHMAN, [facility] Care Coordinator   Telephone: [phone]  Fax: [phone]

## 2022-06-23 ENCOUNTER — PATIENT OUTREACH (OUTPATIENT)
Dept: GERIATRIC MEDICINE | Facility: CLINIC | Age: 87
End: 2022-06-23

## 2022-06-24 ENCOUNTER — PATIENT OUTREACH (OUTPATIENT)
Dept: GERIATRIC MEDICINE | Facility: CLINIC | Age: 87
End: 2022-06-24

## 2022-06-24 NOTE — PROGRESS NOTES
[facility] Care Coordination Contact    Internal CC change effective 7/1/2022.  Mailed member CC Change letter.  Additional tasks to be completed by CMS include: update database & EPIC, enter CC Change in MMIS, and move member files on Q drive.    Yoselyn Barragan  Case Management Specialist  [facility]  [phone]

## 2022-06-24 NOTE — LETTER
June 24, 2022    THU X VU  [address]   [place] [address]      Dear Thu:    As a member of [facility] ([facility]) (O \[facility] [place] [facility]\""), you are provided a care coordinator. I will be your new care coordinator as of 7/1/2022. I will be calling you soon to see how you are doing and determine your needs.    If you have any questions, please feel free to call me at  [phone]. If you reach my voice mail, please leave a message and your phone number. If you are hearing impaired, please call the [place] Relay at 202 or [phone] (armtxm-rg-inmyur relay service).    I look forward to speaking with you soon.    Sincerely,    Marialuisa Guillen RN, BSN  [phone]  [email or web address] is a health plan that contracts with both Medicare and the [place] Medical Assistance (Medicaid) program to provide benefits of both programs to enrollees. Enrollment in [facility] depends on contract renewal.      [facility]  [id number] DHS Approved ([id number])  [id number] (11/18)

## 2022-06-27 NOTE — PROGRESS NOTES
[facility] Care Coordination Contact-6/24/22    Received call from [employer]. Staff person said she was not aware Thu cancelled his meals so another order went out today. Told her I canceled on 6/15.  Discussed the auth was for 6/1/22-6/15/22 so asked if they could use the time before 6/10 when he actually started for the 6 meals they need paid for.    She consulted with someone there and was told no so I will write another auth for 6/16/22-6/30/22 for 6 meals.    Corrie ROTHMAN, [facility] Care Coordinator   Telephone: [phone]  Fax: [phone]

## 2022-06-27 NOTE — PROGRESS NOTES
[facility] Care Coordination Contact    From: Beatriz Mcdonough <[email or web address]>   Sent: Thursday, June 23, 2022 5:21 PM  To: Corrie Espino <[email or web address]>  Cc: Trang Sotomayor <[email or web address]>; Beatriz Mcdonough <[email or web address]>  Subject: RE: Client referral - E.H.       [EXTERNAL] This message comes from an external organization. Exercise caution when opening attachments or clicking links, especially from unknown senders.            I couldn t find Aburto s phone number in [facility] s PCA assessment. Do you mind sending it to me? I think we can do an admission once the service agreement comes through. My coworker Trang has been working on it, but tomorrow is her last day. I ll still be able to help.      Thanks for everything! ??     Sincerely,    Beatriz Mcdonough (she/they)  Designated Coordinator  [facility]  [address] ? [facility] [place] [address]  Phone [phone] ?  Fax [phone]  [email or web address]    From: Corrie Espino   Sent: Friday, June 24, 2022 9:22 AM  To: Beatriz Mcdonough <[email or web address]>  Subject: RE: Client referral - E.H.    [phone]-Aburto Bedor  Just let me know if the PCA works out and then I will send in the referral to [facility].      Thanks!    Corrie ROTHMAN,[facility]  Care Coordinator    [facility]  [address]  [place] [address]  [email or web address]  Office: [phone]  Fax: [phone]

## 2022-06-27 NOTE — PROGRESS NOTES
Member will not be internally transferring to a new CC 7/1/22  Yoselyn Barragan  Case Management Specialist  [facility]  [phone]

## 2022-06-29 ENCOUNTER — PATIENT OUTREACH (OUTPATIENT)
Dept: GERIATRIC MEDICINE | Facility: CLINIC | Age: 87
End: 2022-06-29

## 2022-06-29 ENCOUNTER — TELEPHONE (OUTPATIENT)
Dept: FAMILY MEDICINE | Facility: CLINIC | Age: 87
End: 2022-06-29

## 2022-06-29 DIAGNOSIS — M54.50 LOW BACK PAIN, UNSPECIFIED BACK PAIN LATERALITY, UNSPECIFIED CHRONICITY, UNSPECIFIED WHETHER SCIATICA PRESENT: Primary | ICD-10-CM

## 2022-06-29 NOTE — TELEPHONE ENCOUNTER
Patient's daughter, Lamonte sweeney (signed consent to communicate PHI on file)    She stated that, in the past, she requested a referral for acupuncture but Dr. Dahl advised her that placing a referral was not required.  Patient was seen by [facility] yesterday for acupuncture for back pain.  Patient felt it was helpful and would like to see them again.  Their office is requesting that PCP send a referral to them    Diagnoses    Chronic left-sided low back pain with left-sided sciatica     [facility]    [address] [place] [address]    Phone: [phone]   Fax: [phone]      Please advise    Holly Barros RN  [facility]

## 2022-06-29 NOTE — TELEPHONE ENCOUNTER
I did referral but this does not guarantee insurance will cover    Please inform patient's daughter and send her copy of referral    thanks    Arie Dahl MD

## 2022-06-30 NOTE — PROGRESS NOTES
[facility] Care Coordination Contact-6/29/22    Received call from Lamonte. She said the toilet safety frame and ensure came. No word from the PT or OT.  She also said her brother took their dad to have an acupuncture treatment and were told they need a referral from Thu's PCP. I will look into this.    Lamonte said she and her siblings have agreed to let their mother come and visit Thu for a couple of days to see how it goes.     Lamonte said they haven't heard anything about the PCA either. She said she might as well be the PCA and do only 4 hrs/day (as she is doing the work anyways)because she still is working her FT job.  She will call Moi at [employer] as he was the one who tried to recruit her.    Called [facility] and was told there are no charges pending on Thu's account so unless [facility] denies payment and say they need a referral, no referral is needed for acupuncture.     Called Beatriz at [facility] and .    From: Beatriz Mcdonough <[email or web address]>   Sent: Wednesday, June 29, 2022 3:14 PM  To: Corrie Espino <[email or web address]>  Subject: RE: Client referral - E.H.       [EXTERNAL] This message comes from an external organization. Exercise caution when opening attachments or clicking links, especially from unknown senders.      Staff is definite for [facility]; I just met with him.     Sincerely,    Beatriz Mcdonough (she/they)  Designated Coordinator  [facility]  [address] ? [place] [address]  Phone [phone] ?  Fax [phone]  [email or web address]    From: Corrie Espino <[email or web address]>   Sent: Wednesday, June 29, 2022 3:29 PM  To: Beatriz Mcdonough <[email or web address]>  Subject: RE: Client referral - E.H.    Start date?    Corrie Espino  LS,[facility]  Care Coordinator    [facility]  [address]  [place] [address]  [email or web address]  Office:  [phone]  Fax: [phone]    From: Beatriz Mcdonough <[email or web address]>   Sent: Wednesday, June 29, 2022 3:30 PM  To: Corrie Espino <[email or web address]>  Subject: RE: Client referral - E.H.       [EXTERNAL] This message comes from an external organization. Exercise caution when opening attachments or clicking links, especially from unknown senders.          Start date can be the earliest date.     Thank you!    Sincerely,    Beatriz Mcdonough (she/they)  Designated Coordinator  [facility]    Corrie Espino  [facility] Coordinator   Telephone: [phone]  Fax: [phone]

## 2022-07-05 ENCOUNTER — PATIENT OUTREACH (OUTPATIENT)
Dept: GERIATRIC MEDICINE | Facility: CLINIC | Age: 87
End: 2022-07-05

## 2022-07-05 NOTE — PROGRESS NOTES
[facility] Care Coordination Contact    Received text from Lamonte. She said her dad went to  yesterday with her mother and he liked it and wants to go back. She asked what we can do so he can go 3x/week with her.    I called Lamonte. Asked if Beatriz had called about the PCA starting and she said no. I don't know what is going on with that. Lamonte volunteered to call Beatriz.  I explained there isn't enough money in his budget to cover PCA, 3 days of ADC with transportation, ensure, PCA SV and CM. I would have to reduce PCA and/or ADC to fit in her budget. Told her I will first need to find out if there will be a PCA or not.    Next we talked about the bedrail and grab bar for the bathroom. She sent me a link to [employer] for the bedrail which I will see if [facility] can get it.  I will contact the OT about the grab bar.    Called and LM for Jeanie OT. [phone].    Received text from Lamonte that she talked to Beatriz and she is just waiting for the service agreement to come through. She said the PCA is willing to work less than 40 hrs and Lamonte asked me if her dad can go 2 days per week to ADC.    Corrie ROTHMAN, [facility] Care Coordinator   Telephone: [phone]  Fax: [phone]

## 2022-07-06 ENCOUNTER — PATIENT OUTREACH (OUTPATIENT)
Dept: GERIATRIC MEDICINE | Facility: CLINIC | Age: 87
End: 2022-07-06

## 2022-07-06 NOTE — PROGRESS NOTES
"[facility] Care Coordination Contact    Called Lennox Cathy at [facility]. Asked if Thu could start coming there with his wife. She agreed and can come Wed and Friday's. Start date 7/8.  Texted Abruto and let her know above. Also told her I will need to reduce his PCA to 6 hrs/day to fit the ADC into his budget.Asked if this was ok.  She replied \"Yay!, of course it is! We so appreciate you!\"    Received email back from Aston at [facility]. He will order the bed railing, will need auth. Asked CMS to write auth.    From: Corrie Espino  Sent: Wed, 6 Jul 2022 17:50:48 +0000  To: Beatriz Mcdonough  Cc:  Subject: secure        Hi Beatriz-  There's been a change for Leona David's PCA hours. He wants to attend adult day care now so will need to reduce his PCA hours to 6 hr/day instead of 8. A DTR will be sent to [facility] (someone in our office does this)to alert them of the decrease and then you will receive the auth with the date this will start.   I told Yarelis? in your office this change was happening yesterday but could you relay this to her as well?     Thank you,     Corrie ROTHMAN,[facility]  Care Coordinator     [address] [place] [address]    From: [email or web address] <[email or web address]>   Sent: Wednesday, July 6, 2022 1:16 PM  To: Corrie Espino <[email or web address]>  Subject: RE: secure    Arian Denton,  Thank you for the update and I will pass it along to Yarelis.  Beatriz ROTHMAN, [facility] Care Coordinator   Telephone: [phone]  Fax: [phone]    "

## 2022-07-08 NOTE — PROGRESS NOTES
[facility] Care Coordination Contact    Completed following tasks: Updated services in Database and Submitted referrals/auths for ADC/Transportation.    Provider Signature - No POC Shared:  Member indicates that they do not want their POC shared with any EW providers. and Member Signature - POC Change:  Per CC, member has made a change to their POC.  Care Plan Change Letter mailed to member for signature with a self-addressed return envelope.      Kalee Fontanez  Care Management Specialist  [facility]  [phone]

## 2022-07-08 NOTE — PROGRESS NOTES
[facility] Care Coordination Contact    Order placed with [facility] (p: [phone]; f: [phone]) for Bed rail.  Order placed on 7/8/22. Database updated.  As required, authorization submitted to health plan.      Kalee Fontanez  Care Management Specialist  [facility]  [phone]

## 2022-07-12 ENCOUNTER — PATIENT OUTREACH (OUTPATIENT)
Dept: GERIATRIC MEDICINE | Facility: CLINIC | Age: 87
End: 2022-07-12

## 2022-07-12 NOTE — PROGRESS NOTES
[facility] Care Coordination Contact-7/11/22    Received text from Lamonte. She said they got a bill for her dad's eye Dr dwyer on 3/28/22. He didn't get his [facility] till 4/1. [facility] said they don't take MA. She asked what she should do.  She left a message for the financial worker at the [facility].  Told her to wait to see what the fin worker says.   I asked didn't [facility] know he had MA when he was seen? She said yes, they took a copy of his MA card.    Corrie ROTHMAN, [facility] Care Coordinator   Telephone: [phone]  Fax: [phone]    [facility] Care Coordination Contact-7/12/22    Called and left another message for GABRIELLA Ware about the grab bar.    Corrie ROTHMAN, [facility] Care Coordinator   Telephone: [phone]  Fax: [phone]

## 2022-07-12 NOTE — PROGRESS NOTES
[facility] Care Coordination Contact    2nd Attempt: Signed Letter not received from member, resent per process.     Lillian Bowden  Care Management Specialist  [facility]  [phone]

## 2022-07-12 NOTE — PROGRESS NOTES
[facility] Care Coordination Contact    Received a request to submit a DTR for the reduced of PCA. Documentation completed and faxed to the health plan. Care Coordinator aware.    Sangeeta Ojeda RN  Utilization   [facility]  [phone]

## 2022-08-01 ENCOUNTER — PATIENT OUTREACH (OUTPATIENT)
Dept: GERIATRIC MEDICINE | Facility: CLINIC | Age: 87
End: 2022-08-01

## 2022-08-02 NOTE — PROGRESS NOTES
"[facility] Care Coordination Contact    Received text from Lamonte saying the  has a spot for her dad on Monday's effective 9/1 and asked if I could authorize that.    Called Lamonte. I asked how the 2 days are going? She said it is going good but he wants to go 3 days with his wife as he doesn't trust her. His wife is currently going M,W,F and he is going W,F.  Told her I'd like to wait and see how August goes and if it's still going well, I will authorize the extra day.    Lamonte also said the nurse for the PCA was out last week to do the initial opening but there is not a PCA to start yet. This is not what Beatriz had told me. Lamonte is not feeling good about this company as the nurse didn't seem \"on top of her game\" she said.    Lamonte is thinking about getting hired as the PCA herself and will let me know.     Lastly, Lamonte asked if I could reinstate MOW for Thu as he said he will eat them now.  Also I will have [facility] come out and install the grab bar in the bathroom.    Corrie ROTHMAN, [facility] Care Coordinator   Telephone: [phone]  Fax: [phone]    [facility] Care Coordination Contact-8/2/22    Called [employer] and re-ordered meals to start 8/12/22 as the [employer] meals need to be ordered.    Called [facility] and ordered the wall bar with installation.    Corrie ROTHMAN, [facility] Care Coordinator   Telephone: [phone]  Fax: [phone]    "

## 2022-08-02 NOTE — PROGRESS NOTES
[facility] Care Coordination Contact    Per [facility] - bed rail delivered 7/28/22    Kalee Fontanez  Care Management Specialist  [facility]  [phone]

## 2022-08-05 ENCOUNTER — PATIENT OUTREACH (OUTPATIENT)
Dept: GERIATRIC MEDICINE | Facility: CLINIC | Age: 87
End: 2022-08-05

## 2022-08-16 NOTE — PROGRESS NOTES
[facility] Care Coordination Contact    No Letter Received: 60 day tracking of letter complete, no letter received from member. Tracking discontinued.     Lillian Bowden  Care Management Specialist  [facility]  [phone]

## 2022-08-18 NOTE — PROGRESS NOTES
[facility] Care Coordination Contact-8/5/22    Received text from Lamonte asking for in network PCA providers in [place].   8/8-texted her [facility]. She asked if [facility], [facility], or [facility] are options.  I called the 3 places and the first 2 do not work with [facility]. Waiting to hear back from [facility]. Let Aburto know.    Corrie ROTHMAN, [facility] Care Coordinator   Telephone: [phone]  Fax: [phone]    [facility] Care Coordination Contact-8/15/22    Received text from Lamonte. She said her parents had a big fight at 5:15 am this morning and her mom will be going back to her brother's home this afternoon after adult day care.  Lamonte also asked if her dad's meals could be switched to the veggie menu this Friday?  I responded she can go ahead and call them.  I asked if she's picked a PCA company yet. She said she likes [employer] but they are in [facility]. She continues to look for something closer.    Corrie ROTHMAN, [facility] Care Coordinator   Telephone: [phone]  Fax: [phone]

## 2022-08-19 ENCOUNTER — PATIENT OUTREACH (OUTPATIENT)
Dept: GERIATRIC MEDICINE | Facility: CLINIC | Age: 87
End: 2022-08-19

## 2022-08-19 NOTE — PROGRESS NOTES
CC updated program targets and tasks for [employer] launch.    Corrie ROTHMAN, [facility] Coordinator   Telephone: [phone]  Fax: [phone]

## 2022-09-13 ENCOUNTER — PATIENT OUTREACH (OUTPATIENT)
Dept: GERIATRIC MEDICINE | Facility: CLINIC | Age: 87
End: 2022-09-13

## 2022-09-13 NOTE — PROGRESS NOTES
[facility] Care Coordination Contact    Received text from Lamonte that her dad is only eating one meal out of 5 from [employer] and asked me to cancel them.   I asked if she was sure as I won't be starting them again. She said yes, she was sure as he isn't eating them.  Told her I will cancel the.n.    She also said her son is in the process of working with [email or web address] to become Thu's PCA. I asked her to let me know when he is hired and I will authorize.    Called [employer] MOW and canceled meals as of 9/16/22.    Corrie ROTHMAN, [facility] Care Coordinator   Telephone: [phone]  Fax: [phone]

## 2022-09-27 ENCOUNTER — DOCUMENTATION ONLY (OUTPATIENT)
Dept: OPHTHALMOLOGY | Facility: CLINIC | Age: 87
End: 2022-09-27

## 2022-09-27 ENCOUNTER — OFFICE VISIT (OUTPATIENT)
Dept: OPHTHALMOLOGY | Facility: CLINIC | Age: 87
End: 2022-09-27
Payer: COMMERCIAL

## 2022-09-27 DIAGNOSIS — Z96.1 PSEUDOPHAKIA: ICD-10-CM

## 2022-09-27 DIAGNOSIS — H35.3121 EARLY DRY STAGE NONEXUDATIVE AGE-RELATED MACULAR DEGENERATION OF LEFT EYE: ICD-10-CM

## 2022-09-27 DIAGNOSIS — H18.20 CORNEAL EDEMA OF RIGHT EYE: ICD-10-CM

## 2022-09-27 DIAGNOSIS — H40.1131 PRIMARY OPEN ANGLE GLAUCOMA (POAG) OF BOTH EYES, MILD STAGE: ICD-10-CM

## 2022-09-27 DIAGNOSIS — Z94.7 HISTORY OF DESCEMET'S STRIPPING ENDOTHELIAL KERATOPLASTY (DSEK): ICD-10-CM

## 2022-09-27 DIAGNOSIS — Z01.01 ENCOUNTER FOR EXAMINATION OF EYES AND VISION WITH ABNORMAL FINDINGS: Primary | ICD-10-CM

## 2022-09-27 DIAGNOSIS — H52.4 PRESBYOPIA: ICD-10-CM

## 2022-09-27 DIAGNOSIS — F03.91 DEMENTIA WITH BEHAVIORAL DISTURBANCE, UNSPECIFIED DEMENTIA TYPE: ICD-10-CM

## 2022-09-27 PROCEDURE — 92004 COMPRE OPH EXAM NEW PT 1/>: CPT | Performed by: OPHTHALMOLOGY

## 2022-09-27 PROCEDURE — 92015 DETERMINE REFRACTIVE STATE: CPT | Performed by: OPHTHALMOLOGY

## 2022-09-27 RX ORDER — SODIUM CHLORIDE 5 %
1 OINTMENT (GRAM) OPHTHALMIC (EYE) AT BEDTIME
Qty: 1 G | Refills: 4 | Status: SHIPPED | OUTPATIENT
Start: 2022-09-27

## 2022-09-27 RX ORDER — BRIMONIDINE TARTRATE 1.5 MG/ML
1 SOLUTION/ DROPS OPHTHALMIC 2 TIMES DAILY
Qty: 5 ML | Refills: 4 | Status: SHIPPED | OUTPATIENT
Start: 2022-09-27 | End: 2023-02-01

## 2022-09-27 RX ORDER — DORZOLAMIDE HYDROCHLORIDE AND TIMOLOL MALEATE 20; 5 MG/ML; MG/ML
1 SOLUTION/ DROPS OPHTHALMIC 2 TIMES DAILY
COMMUNITY
Start: 2022-04-29 | End: 2022-09-27

## 2022-09-27 RX ORDER — PREDNISOLONE ACETATE 10 MG/ML
1 SUSPENSION/ DROPS OPHTHALMIC EVERY MORNING
Qty: 5 ML | Refills: 3 | Status: SHIPPED | OUTPATIENT
Start: 2022-09-27 | End: 2023-07-17

## 2022-09-27 RX ORDER — SODIUM CHLORIDE 5 %
1 OINTMENT (GRAM) OPHTHALMIC (EYE) AT BEDTIME
COMMUNITY
Start: 2022-05-23 | End: 2022-09-27

## 2022-09-27 RX ORDER — DORZOLAMIDE HYDROCHLORIDE AND TIMOLOL MALEATE 20; 5 MG/ML; MG/ML
1 SOLUTION/ DROPS OPHTHALMIC 2 TIMES DAILY
Qty: 5 ML | Refills: 4 | Status: SHIPPED | OUTPATIENT
Start: 2022-09-27 | End: 2023-05-31

## 2022-09-27 ASSESSMENT — REFRACTION_MANIFEST
OD_AXIS: 005
OS_SPHERE: -0.25
OS_CYLINDER: +1.25
OS_ADD: +3.25
OD_ADD: +3.25
OD_CYLINDER: +0.25
OS_AXIS: 165
OD_SPHERE: +0.25

## 2022-09-27 ASSESSMENT — REFRACTION_WEARINGRX
SPECS_TYPE: BIFOCAL
OS_CYLINDER: SPHERE
OS_ADD: +2.50
OS_SPHERE: +0.25
SPECS_TYPE: BIFOCAL
OD_CYLINDER: +0.25
OS_SPHERE: -0.50
OD_ADD: +2.50
OD_SPHERE: +0.25
OS_CYLINDER: +0.25
OD_SPHERE: -1.00
OD_CYLINDER: +1.00
OD_AXIS: 005
OS_AXIS: 005
OD_AXIS: 160
OD_ADD: +2.50
OS_ADD: 2.50

## 2022-09-27 ASSESSMENT — CONF VISUAL FIELD
OS_INFERIOR_NASAL_RESTRICTION: 3
OD_INFERIOR_TEMPORAL_RESTRICTION: 3
OD_SUPERIOR_TEMPORAL_RESTRICTION: 3
OD_INFERIOR_NASAL_RESTRICTION: 3
OD_SUPERIOR_NASAL_RESTRICTION: 3

## 2022-09-27 ASSESSMENT — TONOMETRY
OD_IOP_MMHG: 28
IOP_METHOD: APPLANATION
OS_IOP_MMHG: 16

## 2022-09-27 ASSESSMENT — EXTERNAL EXAM - LEFT EYE: OS_EXAM: NORMAL

## 2022-09-27 ASSESSMENT — EXTERNAL EXAM - RIGHT EYE: OD_EXAM: NORMAL

## 2022-09-27 ASSESSMENT — VISUAL ACUITY
OD_CC: HM
CORRECTION_TYPE: GLASSES
METHOD: SNELLEN - LINEAR
OS_CC: 20/40
OS_CC+: -2

## 2022-09-27 ASSESSMENT — CUP TO DISC RATIO: OS_RATIO: 0.4

## 2022-09-27 NOTE — PROGRESS NOTES
Current Eye Medications: Alphagan BID both eyes, Cosopt BID both eyes. Macarena 128 qam right eye, Prednisolone qam right eye     Subjective: here for complete eye exam today. Has glaucoma and dementia. Sometimes lets daughter help and sometimes does not let her put them in. Right eye is the worst of the two. Thinks he may have had corneal transplant right eye    Hemoglobin A1C   Date Value Ref Range Status   04/27/2022 5.8 (H) 0.0 - 5.6 % Final     Comment:     Normal <5.7%   Prediabetes 5.7-6.4%    Diabetes 6.5% or higher     Note: Adopted from ADA consensus guidelines.   11/22/2017 8.0 (H) 4.3 - 6.0 % Final   04/18/2017 7.3 (H) 4.3 - 6.0 % Final   11/01/2016 6.9 (H) 4.3 - 6.0 % Final     Had surgeries in [place] prior to 2014 and maybe w Dr. Bartlett in 2018.  Was in [place] from 2018 until now.  Denies pain right eye.     Objective:  See Ophthalmology Exam.       Assessment:  Baseline eye exam in patient with decompensated DSEK corneal transplant right eye, pseudophakia both eyes, and presumed glaucoma.      ICD-10-CM    1. Encounter for examination of eyes and vision with abnormal findings  Z01.01    2. Presbyopia  H52.4    3. Pseudophakia, ou; Yag Caps, os; od?  Z96.1    4. Corneal edema of right eye  H18.20    5. Primary open angle glaucoma (POAG) of both eyes, mild stage  H40.1131    6. History of Descemet's stripping endothelial keratoplasty (DSEK), od  Z94.7    7. 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 Dementia with behavioral disturbance, unspecified dementia type  F03.91         Plan:  Continue:  Brimonidine (purple top) twice daily both eyes. About 12 hours apart.  Dorzolamide/Timolol (Cosopt - dark blue top) twice daily both eyes. About 12 hours apart.  Prednisolone (white or pink top) eyedrops every morning right eye.  Macarena 128 ointment at bedtime right eye.  Wait at least 5 minutes between drops if using more than one at a time.     Glasses prescription  "given - optional  May use artificial tears up to four times a day (like Refresh Optive, Systane Balance, TheraTears, or generic artificial tears are ok. Avoid \"get the red out\" drops).   Will obtain records from Dr. Bartlett.  Return visit 6 months for an intraocular pressure check, glaucoma OCT, retinal OCT, and Cordon Visual Field.   Rodney [place] M.D.  [phone]      "

## 2022-09-27 NOTE — PATIENT INSTRUCTIONS
"Continue:  Brimonidine (purple top) twice daily both eyes. About 12 hours apart.  Dorzolamide/Timolol (Cosopt - dark blue top) twice daily both eyes. About 12 hours apart.  Prednisolone (white or pink top) eyedrops every morning right eye.  Macarena 128 ointment at bedtime right eye.  Wait at least 5 minutes between drops if using more than one at a time.     Glasses prescription given - optional  May use artificial tears up to four times a day (like Refresh Optive, Systane Balance, TheraTears, or generic artificial tears are ok. Avoid \"get the red out\" drops).   Return visit 6 months for an intraocular pressure check, glaucoma OCT, retinal OCT, and Cordon Visual Field.   Rodney Colorado M.D.  [phone]     Patient Education   Diabetes weakens the blood vessels all over the body, including the eyes. Damage to the blood vessels in the eyes can cause swelling or bleeding into part of the eye (called the retina). This is called diabetic retinopathy ([facility]-tin--[facility]-the). If not treated, this disease can cause vision loss or blindness.   Symptoms may include blurred or distorted vision, but many people have no symptoms. It's important to see your eye doctor regularly to check for problems.   Early treatment and good control can help protect your vision. Here are the things you can do to help prevent vision loss:      1. Keep your blood sugar levels under tight control.      2. Bring high blood pressure under control.      3. No smoking.      4. Have yearly dilated eye exams.       "

## 2022-09-27 NOTE — PROGRESS NOTES
Per [employer], wall bar delivered 08/31/22. CC notified.    Marialuisa Blevins  Care Management Specialist Manager  [facility]  [phone]

## 2022-09-27 NOTE — LETTER
9/27/2022         RE: Leona David  [address]  [place] [address]        Dear Colleague,    Thank you for referring your patient, Leona David, to the [facility]. Please see a copy of my visit note below.     Current Eye Medications: Alphagan BID both eyes, Cosopt BID both eyes. Macarena 128 qam right eye, Prednisolone qam right eye     Subjective: here for complete eye exam today. Has glaucoma and dementia. Sometimes lets daughter help and sometimes does not let her put them in. Right eye is the worst of the two. Thinks he may have had corneal transplant right eye    Hemoglobin A1C   Date Value Ref Range Status   04/27/2022 5.8 (H) 0.0 - 5.6 % Final     Comment:     Normal <5.7%   Prediabetes 5.7-6.4%    Diabetes 6.5% or higher     Note: Adopted from ADA consensus guidelines.   11/22/2017 8.0 (H) 4.3 - 6.0 % Final   04/18/2017 7.3 (H) 4.3 - 6.0 % Final   11/01/2016 6.9 (H) 4.3 - 6.0 % Final     Had surgeries in [place] prior to 2014 and maybe w Dr. Bartlett in 2018.  Was in [place] from 2018 until now.  Denies pain right eye.     Objective:  See Ophthalmology Exam.       Assessment:  Baseline eye exam in patient with decompensated DSEK corneal transplant right eye, pseudophakia both eyes, and presumed glaucoma.      ICD-10-CM    1. Encounter for examination of eyes and vision with abnormal findings  Z01.01    2. Presbyopia  H52.4    3. Pseudophakia, ou; Yag Caps, os; od?  Z96.1    4. Corneal edema of right eye  H18.20    5. Primary open angle glaucoma (POAG) of both eyes, mild stage  H40.1131    6. History of Descemet's stripping endothelial keratoplasty (DSEK), od  Z94.7    7. 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 Dementia with behavioral disturbance, unspecified dementia type  F03.91         Plan:  Continue:  Brimonidine (purple top) twice daily both eyes. About 12 hours apart.  Dorzolamide/Timolol (Cosopt - dark blue top) twice daily both  "eyes. About 12 hours apart.  Prednisolone (white or pink top) eyedrops every morning right eye.  Macarena 128 ointment at bedtime right eye.  Wait at least 5 minutes between drops if using more than one at a time.     Glasses prescription given - optional  May use artificial tears up to four times a day (like Refresh Optive, Systane Balance, TheraTears, or generic artificial tears are ok. Avoid \"get the red out\" drops).   Will obtain records from Dr. Bartlett.  Return visit 6 months for an intraocular pressure check, glaucoma OCT, retinal OCT, and Cordon Visual Field.   Rodney Colorado M.D.  [phone]          Again, thank you for allowing me to participate in the care of your patient.        Sincerely,        Rodney Colorado MD    "

## 2022-10-19 ENCOUNTER — PATIENT OUTREACH (OUTPATIENT)
Dept: GERIATRIC MEDICINE | Facility: CLINIC | Age: 87
End: 2022-10-19

## 2022-10-19 NOTE — PROGRESS NOTES
[facility] Care Coordination Contact-10/13/22    Received call from Moi at [facility]. He asked for PCA assessment be faxed to him. He said Thu's grandson is now hired and can begin working.    ASked CMS to fax over the assessment.    Corrie ROTHMAN, [facility] Care Coordinator   Telephone: [phone]  Fax: [phone]

## 2022-10-19 NOTE — PROGRESS NOTES
[facility] Care Coordination Contact 10/17/22    Received text from Lamonte. She asked how many hours her dad gets per day and week.  Replied 6 hrs/day x 7 days/week.    Was asked by [facility] to have new PCA provider fill out transfer notification form.  Called Moi at [facility] and . Asked him to send in form. He replied by text that he did this today.    Corrie ROTHMAN, [facility] Care Coordinator   Telephone: [phone]  Fax: [phone]

## 2022-10-25 ENCOUNTER — PATIENT OUTREACH (OUTPATIENT)
Dept: GERIATRIC MEDICINE | Facility: CLINIC | Age: 87
End: 2022-10-25

## 2022-10-25 NOTE — PROGRESS NOTES
[facility] Care Coordination Contact-10/19/22    Received email from CMS; from secure [facility] site:  Corrie REAVES  [id number] [facility] 10/20/1931  [employer] PCA: Initial Assessment PCA:   10/13/2022 10/31/2022 Change of Agency 456   Corrie REAVES  [phone] [facility] 10/20/1931  [employer] PCA: Initial Assessment PCA:   11/1/2022 4/30/2023 Change of Agency [phone]   Corrie ROTHMAN, [facility] Coordinator   Telephone: [phone]  Fax: [phone]

## 2022-10-27 ENCOUNTER — APPOINTMENT (OUTPATIENT)
Dept: OPTOMETRY | Facility: CLINIC | Age: 87
End: 2022-10-27
Payer: COMMERCIAL

## 2022-10-27 PROCEDURE — 92341 FIT SPECTACLES BIFOCAL: CPT | Performed by: OPHTHALMOLOGY

## 2022-11-21 ENCOUNTER — PATIENT OUTREACH (OUTPATIENT)
Dept: GERIATRIC MEDICINE | Facility: CLINIC | Age: 87
End: 2022-11-21

## 2022-11-21 NOTE — PROGRESS NOTES
[facility] Care Coordination Contact-11/14/22    Received text from Aburto. She said they received a bill from [employer] and asked me to help with it.    Called [employer] and got the dates and total number of meals that were delivered.  Called [facility], no auth had been entered so I gave info to CMS to submit to [facility].   Let Aburto know a new auth has been submitted to [facility].    Corrie ROTHMAN, [facility] Care Coordinator   Telephone: [phone]  Fax: [phone]
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
## 2022-12-01 ENCOUNTER — TELEPHONE (OUTPATIENT)
Dept: FAMILY MEDICINE | Facility: CLINIC | Age: 87
End: 2022-12-01

## 2022-12-01 NOTE — TELEPHONE ENCOUNTER
Routing to PCP    Patient not sleeping well.    Patient wife states patient has been taking 2 Seroquel for 4-5 month will little effect on sleep..    Patient wife asking if there is Seroquel can be increased or if there is something different patient can take?        RN received call from patients wife regarding above..  Consent to communicate on file.    RN advised she would send message to provider.    Riki Vasquez RN, BSN, PHN  [facility]

## 2022-12-03 NOTE — TELEPHONE ENCOUNTER
I am very reluctant to increase dose or add another prescription sleep med in this 89 year old patient    He could try some over the counter melatonin as needed, start like 1 or 3 mg.  Could increase slowly.  Max of 10 mg .    Please inform patient/family    Arie Dahl MD

## 2022-12-05 NOTE — TELEPHONE ENCOUNTER
"Spoke with daughter and gave PCP message:     \"I am very reluctant to increase dose or add another prescription sleep med in this 89 year old patient     He could try some over the counter melatonin as needed, start like 1 or 3 mg.  Could increase slowly.  Max of 10 mg .     Please inform patient/family\"    Daughter states that she has been using 10mg melatonin 1 tab a night for a few months; does fall asleep ok, but is waking up at about 2 am and stays awake for a few hours.    Is also taking 2 tabs of seroquel at nighttime, does not really notice that it is helpful; does report behavior overall has improved but still trouble sleeping.     Daughter states it is becoming difficult for her as he will keep her up at nighttime.     Looking for any additional recommendations.    Jayna Delgado RN  [facility]    "

## 2022-12-06 NOTE — TELEPHONE ENCOUNTER
Spoke with patient's daughter. Relayed provider's message as written. Patient's daughter verbalized understanding and has no further questions at this time.    Patient's daughter states patient's symptoms have improved and will continue to monitor.     Jed Padilla RN  [facility]

## 2022-12-06 NOTE — TELEPHONE ENCOUNTER
I don't have a good solution.  As I mention in my clinic note from earlier this year, they may need to look at other housing/ living options.  May not be realistic to live with daughter anymore.    They are free to see another provider if desired    Please inform patient/family    Arie Dahl MD

## 2023-02-01 DIAGNOSIS — H40.1131 PRIMARY OPEN ANGLE GLAUCOMA (POAG) OF BOTH EYES, MILD STAGE: ICD-10-CM

## 2023-02-01 RX ORDER — BRIMONIDINE TARTRATE 1.5 MG/ML
1 SOLUTION/ DROPS OPHTHALMIC 2 TIMES DAILY
Qty: 5 ML | Refills: 4 | Status: SHIPPED | OUTPATIENT
Start: 2023-02-01 | End: 2023-06-12

## 2023-02-01 NOTE — TELEPHONE ENCOUNTER
Refilling drops per Dr. Colorado's last visit note: Continue:  Brimonidine (purple top) twice daily both eyes. Has follow up scheduled 03/28/23

## 2023-02-28 ENCOUNTER — PATIENT OUTREACH (OUTPATIENT)
Dept: GERIATRIC MEDICINE | Facility: CLINIC | Age: 88
End: 2023-02-28
Payer: COMMERCIAL

## 2023-02-28 NOTE — LETTER
February 28, 2023    THU X VU  [address]  [place] [address]      Dear Thu:    As a member of [facility] ([facility] ([facility]), you are provided a care coordinator. I will be your new care coordinator as of 3/1/2023. I will be calling you soon to see how you are doing and determine your needs.    If you have any questions, please feel free to call me at [phone]. If you reach my voice mail, please leave a message and your phone number. If you are hearing impaired, please call the [place] Relay at 997 or [phone] (rpvdxq-tn-avwpuc relay service).    I look forward to speaking with you soon.    Sincerely,      Lennie Saeed RN, N  [phone]  [email or web address]      [facility] is a health plan that contracts with both Medicare and the [place] Medical Assistance (Medicaid) program to provide benefits of both programs to enrollees. Enrollment in [facility] depends on contract renewal.      [facility]  [id number] DHS Approved ([id number])  [id number] (11/18)

## 2023-02-28 NOTE — PROGRESS NOTES
[facility] Care Coordination Contact    Internal CC change effective 3/1/2023.  Mailed member CC Change letter.  Additional tasks to be completed by CMS include: update database & EPIC, enter CC Change in MMIS, and move member files on Q drive.    Yoselyn Barragan  Case Management Specialist  [facility]  [phone]

## 2023-03-13 DIAGNOSIS — E78.5 HYPERLIPIDEMIA WITH TARGET LDL LESS THAN 130: ICD-10-CM

## 2023-03-13 RX ORDER — ATORVASTATIN CALCIUM 10 MG/1
10 TABLET, FILM COATED ORAL DAILY
Qty: 90 TABLET | Refills: 3 | Status: SHIPPED | OUTPATIENT
Start: 2023-03-13 | End: 2024-03-04

## 2023-03-20 ENCOUNTER — PATIENT OUTREACH (OUTPATIENT)
Dept: GERIATRIC MEDICINE | Facility: CLINIC | Age: 88
End: 2023-03-20
Payer: COMMERCIAL

## 2023-03-20 NOTE — PROGRESS NOTES
Encounter opened due to Regulatory Comp[employer] Erica Update to open FVP Program.    Marcy Agee  Care Management Specialist   [facility]   [phone]

## 2023-03-20 NOTE — PROGRESS NOTES
Encounter opened due to Regulatory Comp[employer] Erica Update to close FVP Program.    Marcy Agee  Care Management Specialist   [facility]   [phone]

## 2023-03-20 NOTE — PROGRESS NOTES
[facility] Care Coordination Contact    Called adult daughter Lamonte to schedule annual HRA home visit. HRA has been scheduled for 03/27/23 at 2:00pm at member's home..  Lamonte reports she will be present and will be the responsible party for the Personal Care Attendant assessment again this year. She will provide interpretation for member.     Lennie Saeed RN PHN  [facility]  [phone]

## 2023-03-27 ENCOUNTER — PATIENT OUTREACH (OUTPATIENT)
Dept: GERIATRIC MEDICINE | Facility: CLINIC | Age: 88
End: 2023-03-27
Payer: COMMERCIAL

## 2023-03-27 DIAGNOSIS — G31.83 LEWY BODY DEMENTIA WITH BEHAVIORAL DISTURBANCE (H): Primary | ICD-10-CM

## 2023-03-27 DIAGNOSIS — F02.818 LEWY BODY DEMENTIA WITH BEHAVIORAL DISTURBANCE (H): Primary | ICD-10-CM

## 2023-03-27 DIAGNOSIS — R32 URINARY INCONTINENCE, UNSPECIFIED TYPE: ICD-10-CM
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
## 2023-03-29 SDOH — HEALTH STABILITY: PHYSICAL HEALTH: ON AVERAGE, HOW MANY MINUTES DO YOU ENGAGE IN EXERCISE AT THIS LEVEL?: 0 MIN

## 2023-03-29 SDOH — ECONOMIC STABILITY: FOOD INSECURITY: WITHIN THE PAST 12 MONTHS, YOU WORRIED THAT YOUR FOOD WOULD RUN OUT BEFORE YOU GOT MONEY TO BUY MORE.: NEVER TRUE

## 2023-03-29 SDOH — ECONOMIC STABILITY: FOOD INSECURITY: WITHIN THE PAST 12 MONTHS, THE FOOD YOU BOUGHT JUST DIDN'T LAST AND YOU DIDN'T HAVE MONEY TO GET MORE.: NEVER TRUE

## 2023-03-29 SDOH — ECONOMIC STABILITY: INCOME INSECURITY: IN THE LAST 12 MONTHS, WAS THERE A TIME WHEN YOU WERE NOT ABLE TO PAY THE MORTGAGE OR RENT ON TIME?: NO

## 2023-03-29 SDOH — HEALTH STABILITY: PHYSICAL HEALTH: ON AVERAGE, HOW MANY DAYS PER WEEK DO YOU ENGAGE IN MODERATE TO STRENUOUS EXERCISE (LIKE A BRISK WALK)?: 0 DAYS

## 2023-03-29 ASSESSMENT — LIFESTYLE VARIABLES
HOW MANY STANDARD DRINKS CONTAINING ALCOHOL DO YOU HAVE ON A TYPICAL DAY: PATIENT DOES NOT DRINK
SKIP TO QUESTIONS 9-10: 1
AUDIT-C TOTAL SCORE: 0
HOW OFTEN DO YOU HAVE A DRINK CONTAINING ALCOHOL: NEVER
HOW OFTEN DO YOU HAVE SIX OR MORE DRINKS ON ONE OCCASION: NEVER

## 2023-03-29 ASSESSMENT — SOCIAL DETERMINANTS OF HEALTH (SDOH)
WITHIN THE LAST YEAR, HAVE YOU BEEN AFRAID OF YOUR PARTNER OR EX-PARTNER?: NO
HOW OFTEN DO YOU ATTENT MEETINGS OF THE CLUB OR ORGANIZATION YOU BELONG TO?: NEVER
HOW OFTEN DO YOU GET TOGETHER WITH FRIENDS OR RELATIVES?: MORE THAN THREE TIMES A WEEK
WITHIN THE LAST YEAR, HAVE YOU BEEN KICKED, HIT, SLAPPED, OR OTHERWISE PHYSICALLY HURT BY YOUR PARTNER OR EX-PARTNER?: NO
HOW HARD IS IT FOR YOU TO PAY FOR THE VERY BASICS LIKE FOOD, HOUSING, MEDICAL CARE, AND HEATING?: NOT HARD AT ALL
WITHIN THE LAST YEAR, HAVE TO BEEN RAPED OR FORCED TO HAVE ANY KIND OF SEXUAL ACTIVITY BY YOUR PARTNER OR EX-PARTNER?: NO
WITHIN THE LAST YEAR, HAVE YOU BEEN HUMILIATED OR EMOTIONALLY ABUSED IN OTHER WAYS BY YOUR PARTNER OR EX-PARTNER?: NO
HOW OFTEN DO YOU ATTEND CHURCH OR RELIGIOUS SERVICES?: NEVER
DO YOU BELONG TO ANY CLUBS OR ORGANIZATIONS SUCH AS CHURCH GROUPS UNIONS, FRATERNAL OR ATHLETIC GROUPS, OR SCHOOL GROUPS?: NO
IN A TYPICAL WEEK, HOW MANY TIMES DO YOU TALK ON THE PHONE WITH FAMILY, FRIENDS, OR NEIGHBORS?: MORE THAN THREE TIMES A WEEK

## 2023-03-31 ENCOUNTER — PATIENT OUTREACH (OUTPATIENT)
Dept: GERIATRIC MEDICINE | Facility: CLINIC | Age: 88
End: 2023-03-31
Payer: COMMERCIAL

## 2023-03-31 NOTE — PROGRESS NOTES
[facility] Care Coordination Contact    Received after visit chart from care coordinator.  Completed following tasks: Mailed copy of care plan to client, Updated services in Database, Submitted referrals/auths for [employer] and Mailed Safe Medication Disposal   , Provider Signature - No POC Shared:  Member indicates that they do not want their POC shared with any EW providers.  [facility]:  Emailed completed PCA assessment to [facility].  Faxed copy of PCA assessment to PCA Agency and mailed copy to member.  Faxed MD Communication to PCP.     Marcy Agee  Care Management Specialist   [facility]   [phone]

## 2023-03-31 NOTE — LETTER
March 31, 2023      THU X VU  [address]  [place] [address]      Dear Thu:    At [facility], we re dedicated to improving your health and wellness. Enclosed is the Care Plan developed with you on 3/27/2023. Please review the Care Plan carefully.    As a reminder, during your visit we talked about:    Ways to manage your physical and mental health    Using health care to maintain and improve your health     Your preventive care needs     Remember to contact your care coordinator if you:    Are hospitalized, or plan to be hospitalized     Have a fall      Have a change in your physical or mental health    Need help finding support or services    If you have questions, or don t agree with your Care Plan, call me at [phone]. You can also call me if your needs change. TTY users, call the [place] Relay at (394) or [phone] (ydqxiz-sp-vapruy relay service).    Sincerely,      Lennie Saeed RN, PHN  [phone]  [email or web address]      [facility]") is a health plan that contracts with both Medicare and the [place] Medical Assistance (Medicaid) program to provide benefits of both programs to enrollees. Enrollment in [facility] depends on contract renewal.    [id number] accepted    [id number] (07/2022)

## 2023-04-04 ENCOUNTER — MEDICAL CORRESPONDENCE (OUTPATIENT)
Dept: HEALTH INFORMATION MANAGEMENT | Facility: CLINIC | Age: 88
End: 2023-04-04
Payer: COMMERCIAL

## 2023-04-18 ENCOUNTER — PATIENT OUTREACH (OUTPATIENT)
Dept: GERIATRIC MEDICINE | Facility: CLINIC | Age: 88
End: 2023-04-18
Payer: COMMERCIAL

## 2023-04-18 NOTE — PROGRESS NOTES
[facility] Care Coordination Contact    Received a call from [employer] member daughter reporting that she received a bill from [employer]. Care Coordinator called [employer] for clarification on why they are billing member when he is on elderly waiver. Care Coordinator was told that a total of 17 meals were sent after the service agreement ended. Care Coordinator asked her cms to send an auth request for [facility] to extend the auth until 08/31/2022 to cover these meals so that the member was not charged for this.     Care Coordinator called again to verify that the auth was received, was told to call Paula Hirsch at [phone] to confirm. Paula confirms that they received the auth but that there would still be an outstanding balance. Care Coordinator is not understanding, the auth extended 45 days to bill for 17 meals. Paula reports she will look into this and give Care Coordinator a call back    Lennie Saeed RN PHN  [facility]  [phone]

## 2023-04-21 DIAGNOSIS — R73.01 IMPAIRED FASTING GLUCOSE: ICD-10-CM

## 2023-04-21 DIAGNOSIS — G47.00 INSOMNIA, UNSPECIFIED TYPE: ICD-10-CM

## 2023-04-21 DIAGNOSIS — F03.918 DEMENTIA WITH BEHAVIORAL DISTURBANCE (H): ICD-10-CM

## 2023-04-21 RX ORDER — QUETIAPINE FUMARATE 25 MG/1
TABLET, FILM COATED ORAL
Qty: 180 TABLET | Refills: 0 | Status: SHIPPED | OUTPATIENT
Start: 2023-04-21 | End: 2023-06-12

## 2023-04-25 ENCOUNTER — PATIENT OUTREACH (OUTPATIENT)
Dept: GERIATRIC MEDICINE | Facility: CLINIC | Age: 88
End: 2023-04-25
Payer: COMMERCIAL

## 2023-04-25 NOTE — PROGRESS NOTES
[facility] Care Coordination Contact    Received a request to submit a DTR for the terminated of Adult Day Care. Documentation completed and faxed to the health plan. Care Coordinator aware.    Sangeeta Ojeda RN  Utilization   [facility]  [phone]

## 2023-05-08 ENCOUNTER — OFFICE VISIT (OUTPATIENT)
Dept: FAMILY MEDICINE | Facility: CLINIC | Age: 88
End: 2023-05-08
Payer: COMMERCIAL

## 2023-05-08 ENCOUNTER — ANCILLARY PROCEDURE (OUTPATIENT)
Dept: GENERAL RADIOLOGY | Facility: CLINIC | Age: 88
End: 2023-05-08
Attending: FAMILY MEDICINE
Payer: COMMERCIAL

## 2023-05-08 VITALS
OXYGEN SATURATION: 100 % | RESPIRATION RATE: 12 BRPM | SYSTOLIC BLOOD PRESSURE: 142 MMHG | DIASTOLIC BLOOD PRESSURE: 70 MMHG | BODY MASS INDEX: 25.13 KG/M2 | HEART RATE: 70 BPM | WEIGHT: 128 LBS | TEMPERATURE: 97.1 F | HEIGHT: 60 IN

## 2023-05-08 DIAGNOSIS — K59.00 CONSTIPATION, UNSPECIFIED CONSTIPATION TYPE: ICD-10-CM

## 2023-05-08 DIAGNOSIS — R10.84 ABDOMINAL PAIN, GENERALIZED: Primary | ICD-10-CM

## 2023-05-08 DIAGNOSIS — I10 HYPERTENSION GOAL BP (BLOOD PRESSURE) < 140/90: ICD-10-CM

## 2023-05-08 PROCEDURE — 74019 RADEX ABDOMEN 2 VIEWS: CPT | Mod: TC | Performed by: RADIOLOGY

## 2023-05-08 PROCEDURE — 99213 OFFICE O/P EST LOW 20 MIN: CPT | Performed by: FAMILY MEDICINE

## 2023-05-08 RX ORDER — POLYETHYLENE GLYCOL 3350 17 G/17G
1 POWDER, FOR SOLUTION ORAL 2 TIMES DAILY PRN
Qty: 225 G | Refills: 1 | Status: SHIPPED | OUTPATIENT
Start: 2023-05-08

## 2023-05-08 RX ORDER — DOCUSATE SODIUM 100 MG/1
100 CAPSULE, LIQUID FILLED ORAL 2 TIMES DAILY PRN
Qty: 30 CAPSULE | Refills: 1 | Status: SHIPPED | OUTPATIENT
Start: 2023-05-08 | End: 2023-05-22

## 2023-05-08 ASSESSMENT — ENCOUNTER SYMPTOMS: CRAMPS: 1

## 2023-05-08 ASSESSMENT — PAIN SCALES - GENERAL: PAINLEVEL: MILD PAIN (2)

## 2023-05-08 NOTE — PATIENT INSTRUCTIONS
Increase fluid intake    Take the stool softener twice daily as needer    Take miralax, mix with water and drink  up to 2x daily as needed    If symptoms improve, fine    If not, follow up     If symptoms get worse, to emergency room

## 2023-05-08 NOTE — PROGRESS NOTES
Subjective   Thu is a 89 year old, presenting for the following health issues:  Abdominal Cramping        5/8/2023     2:23 PM   Additional Questions   Roomed by Jamila Mcmanus     Abdominal Cramping    History of Present Illness       Reason for visit:  Belly ache  Symptom onset:  3-7 days ago  Symptoms include:  Belly ache  Symptom intensity:  Severe  Symptom progression:  Staying the same  Had these symptoms before:  No  What makes it worse:  When i lie down  What makes it better:  Warm water at my belly    He eats 2-3 servings of fruits and vegetables daily.He consumes 1 sweetened beverage(s) daily.He exercises with enough effort to increase his heart rate 10 to 19 minutes per day.  He exercises with enough effort to increase his heart rate 3 or less days per week.                   Review of Systems   About one  Week    Eating less    Not real hungry    Daughter heard about it a couple days ago    No vomiting    bms okay    Had one today? Daughter not sure about his memory    Eating  Some  But less than  Usual    No bloody or black stools    No fever    Not feeling constipated      Objective    BP (!) 157/84 (BP Location: Left arm, Patient Position: Chair, Cuff Size: Adult Regular)   Pulse 70   Temp 97.1  F (36.2  C) (Temporal)   Resp 12   Ht 1.524 m (5')   Wt 58.1 kg (128 lb)   SpO2 100%   BMI 25.00 kg/m    Body mass index is 25 kg/m .  Physical Exam  Constitutional:       Appearance: He is well-developed.   HENT:      Head: Normocephalic and atraumatic.   Eyes:      Conjunctiva/sclera: Conjunctivae normal.   Neck:      Vascular: No carotid bruit.   Cardiovascular:      Rate and Rhythm: Normal rate and regular rhythm.      Heart sounds: Normal heart sounds.   Pulmonary:      Effort: Pulmonary effort is normal. No respiratory distress.      Breath sounds: Normal breath sounds.   Abdominal:      Palpations: Abdomen is soft.      Comments: Small bulge on right side of abdomen but abd very soft,  nontender even to deep palpation.  No reaction to palpation at all.     Neurological:      Mental Status: He is alert and oriented to person, place, and time.      Cranial Nerves: No cranial nerve deficit.   Psychiatric:         Speech: Speech normal.         Behavior: Behavior normal.                Xray abd show lots of stool, no obstruction       ASSESSMENT / PLAN:  (R10.84) Abdominal pain, generalized  (primary encounter diagnosis)  Comment: discussed in detail with patient and daughter.    Plan: XR Abdomen 2 Views        Exam here is reassuring.      (I10) Hypertension goal BP (blood pressure) < 140/90  Comment: blood pressure is a bit high but improved   Plan: monitor    (K59.00) Constipation, unspecified constipation type  Comment: discussed in detail.  Trial of prn meds.  Stressed that these are prn.   Increase fluid intake and physical activity.    Plan: docusate sodium (COLACE) 100 MG capsule,         polyethylene glycol (MIRALAX) 17 GM/Dose powder          Follow up as needed based on symptoms     To emergency room if symptoms worsen    They agreed     Patient looks very comfortable on exam       I reviewed the patient's medications, allergies, medical history, family history, and social history.    Arie Dahl MD

## 2023-05-22 ENCOUNTER — PATIENT OUTREACH (OUTPATIENT)
Dept: GERIATRIC MEDICINE | Facility: CLINIC | Age: 88
End: 2023-05-22
Payer: COMMERCIAL

## 2023-05-22 ENCOUNTER — NURSE TRIAGE (OUTPATIENT)
Dept: NURSING | Facility: CLINIC | Age: 88
End: 2023-05-22
Payer: COMMERCIAL

## 2023-05-22 DIAGNOSIS — K59.00 CONSTIPATION, UNSPECIFIED CONSTIPATION TYPE: ICD-10-CM

## 2023-05-22 RX ORDER — DOCUSATE SODIUM 100 MG/1
100 CAPSULE, LIQUID FILLED ORAL 2 TIMES DAILY PRN
Qty: 30 CAPSULE | Refills: 3 | Status: SHIPPED | OUTPATIENT
Start: 2023-05-22 | End: 2023-09-12

## 2023-05-22 NOTE — PROGRESS NOTES
[facility] Care Coordination Contact  CC received notification of Emergency Room visit.  ER visit occurred on 5/14/2023 at [facility]  with Dx of Abdominal pain. Late notification    CC contacted adult daughter Lamonte and reviewed discharge summary.  Member has a follow-up appointment with PCP: No: Offered Assistance with setting up a follow up appointment  Member has had a change in condition: No  New referrals placed: No  Home Visit Needed: No  Care plan reviewed and updated.  PCP notified of ED visit via EMR.  ml encouraged family to schedule an appt witht he member's PCP but family stated the member is feeling better and does not want to go. Aburto will call PCP for a refill for colace. Kellie Jacobsen RN,BSN  [facility] Case Coordinator   [phone]

## 2023-05-22 NOTE — TELEPHONE ENCOUNTER
"Spoke with daughter, Aburto, and gave PCP message:    \"                            I reviewed the emergency room report  He can follow up in clinic as needed  I refilled the docusate                    \"    Verbalized understanding.    Jayna Delgado RN  [facility]    "

## 2023-05-22 NOTE — TELEPHONE ENCOUNTER
I reviewed the emergency room report  He can follow up in clinic as needed  I refilled the docusate    Please inform patient/family    Thanks    Arie Dahl MD

## 2023-05-22 NOTE — TELEPHONE ENCOUNTER
Nurse Triage SBAR    Is this a 2nd Level Triage? NO    Situation: patient refusing ED follow up and asking for refill    Background:  Aburto, patient daughter calling, consent on file. Patient was seen in the ED and was told to have a follow up with PCP but patient is feeling better now and is refusing. Patient was seen in [facility] ED on 5/14 and had an ultra sound, xray, and blood work completed.    Patient is feeling better. No complaints of pain and appetite is better and he is eating well. Daughter believes that he is having regular bowel movements. Patient was prescribed Miralax and docusate sodium by PCP. Patient does not like and is refusing the Miralax but daughter is giving him the docusate 100 mg twice a day with his other medication.  This seems to be working well.  Daughter asking for refill of the docusate be sent to [facility].   Pending medication to [facility] pharmacy and routing message and refill request to PCP.     Patient last seen in office with PCP on 5/8/2023    Assessment: refill medication, monitor at home    Protocol Recommended Disposition:   Discuss With PCP And Callback By Nurse Today    Recommendation: refill medication, recommendations on follow up.      Routed to provider    Does the patient meet one of the following criteria for ADS visit consideration? 16+ years old, with an [facility] PCP     TIP  Providers, please consider if this condition is appropriate for management at one of our Acute and Diagnostic Services sites.     If patient is a good candidate, please use dotphrase <dot>triageresponse and select Refer to ADS to document.             Nupur Hernández RN   05/22/23 9:11 AM  [facility] Nurse Advisor    Reason for Disposition    Caller has NON-URGENT medicine question about med that PCP prescribed and triager unable to answer question    Additional Information    Negative: New-onset or worsening symptoms, see that protocol (e.g., diarrhea, runny nose, sore throat)    Negative: Medicine question  not related to refill or renewal    Negative: Caller (e.g., patient or pharmacist) requesting information about a new medicine    Negative: Caller requesting information unrelated to medicine    Negative: Prescription refill request for ESSENTIAL medicine (i.e., likelihood of harm to patient if not taken) and triager unable to refill per department policy    Negative: Prescription not at pharmacy and was prescribed by PCP recently  (Exception: triager has access to EMR and prescription is recorded there. Go to Home Care and confirm for pharmacy.)    Negative: Pharmacy calling with prescription questions and triager unable to answer question    Negative: Caller requesting a CONTROLLED substance prescription refill (e.g., narcotics, ADHD medicines)    Negative: Prescription refill request for NON-ESSENTIAL medicine (i.e., no harm to patient if med not taken) and triager unable to refill per department policy    Protocols used: MEDICATION REFILL AND RENEWAL CALL-A-OH

## 2023-05-31 DIAGNOSIS — H40.1131 PRIMARY OPEN ANGLE GLAUCOMA (POAG) OF BOTH EYES, MILD STAGE: ICD-10-CM

## 2023-05-31 RX ORDER — DORZOLAMIDE HYDROCHLORIDE AND TIMOLOL MALEATE 20; 5 MG/ML; MG/ML
1 SOLUTION/ DROPS OPHTHALMIC 2 TIMES DAILY
Qty: 5 ML | Refills: 4 | Status: SHIPPED | OUTPATIENT
Start: 2023-05-31 | End: 2024-05-30

## 2023-05-31 NOTE — TELEPHONE ENCOUNTER
Refill of Dorzolamide sent [facility].   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

## 2023-06-12 DIAGNOSIS — H40.1131 PRIMARY OPEN ANGLE GLAUCOMA (POAG) OF BOTH EYES, MILD STAGE: ICD-10-CM

## 2023-06-12 RX ORDER — BRIMONIDINE TARTRATE 1.5 MG/ML
1 SOLUTION/ DROPS OPHTHALMIC 2 TIMES DAILY
Qty: 5 ML | Refills: 4 | Status: SHIPPED | OUTPATIENT
Start: 2023-06-12 | End: 2024-04-15

## 2023-07-17 ENCOUNTER — TELEPHONE (OUTPATIENT)
Dept: OPHTHALMOLOGY | Facility: CLINIC | Age: 88
End: 2023-07-17
Payer: COMMERCIAL

## 2023-07-17 DIAGNOSIS — H18.20 CORNEAL EDEMA OF RIGHT EYE: ICD-10-CM

## 2023-07-17 RX ORDER — PREDNISOLONE ACETATE 10 MG/ML
1 SUSPENSION/ DROPS OPHTHALMIC EVERY MORNING
Qty: 5 ML | Refills: 3 | Status: SHIPPED | OUTPATIENT
Start: 2023-07-17

## 2023-07-17 NOTE — TELEPHONE ENCOUNTER
[facility] Call Center    Phone Message    May a detailed message be left on voicemail: yes     Reason for Call: Medication Refill Request    Has the patient contacted the pharmacy for the refill? Yes   Name of medication being requested: prednisoLONE acetate (PRED FORTE) 1 % ophthalmic suspension  Provider who prescribed the medication: Dr. Colorado   Pharmacy: [id number] [place] - [address] [place] [facility]. AT [address] [place]  Date medication is needed: ASAP     Action Taken: Other: eye    Travel Screening: Not Applicable

## 2023-08-07 DIAGNOSIS — G47.00 INSOMNIA, UNSPECIFIED TYPE: ICD-10-CM

## 2023-08-07 DIAGNOSIS — F03.918 DEMENTIA WITH BEHAVIORAL DISTURBANCE (H): ICD-10-CM

## 2023-08-07 DIAGNOSIS — R73.01 IMPAIRED FASTING GLUCOSE: ICD-10-CM

## 2023-08-08 RX ORDER — QUETIAPINE FUMARATE 25 MG/1
TABLET, FILM COATED ORAL
Qty: 180 TABLET | Refills: 0 | OUTPATIENT
Start: 2023-08-08

## 2023-09-12 DIAGNOSIS — K59.00 CONSTIPATION, UNSPECIFIED CONSTIPATION TYPE: ICD-10-CM

## 2023-09-12 RX ORDER — DOCUSATE SODIUM 100 MG/1
CAPSULE, LIQUID FILLED ORAL
Qty: 30 CAPSULE | Refills: 3 | Status: SHIPPED | OUTPATIENT
Start: 2023-09-12 | End: 2024-01-02

## 2023-09-22 ENCOUNTER — PATIENT OUTREACH (OUTPATIENT)
Dept: GERIATRIC MEDICINE | Facility: CLINIC | Age: 88
End: 2023-09-22
Payer: COMMERCIAL

## 2023-09-22 NOTE — PROGRESS NOTES
[facility] Care Coordination Contact      [facility] Six-Month Telephone Assessment    6 month telephone assessment completed on 08/22/2023 with daughter Lamonte.    ER visits: Yes -  [facility]   Hospitalizations: No  TCU stays: No  Significant health status changes: none reported  Falls/Injuries: No  ADL/IADL changes: No  Changes in services: No    Caregiver Assessment follow up:  Lamonte reports her father remains the same. He can be difficult to care for at times with the dementia but she manages. Care Coordinator told her of the care giver assistance program with [facility] and [facility]. She will think about this.     Goals: See POC in chart for goal progress documentation.      Will see member in 6 months for an annual health risk assessment.   Encouraged member to call CC with any questions or concerns in the meantime.     Lennie Saeed RN PHN  Care Coordinator  [facility]  [phone]

## 2023-10-10 ENCOUNTER — IMMUNIZATION (OUTPATIENT)
Dept: NURSING | Facility: CLINIC | Age: 88
End: 2023-10-10
Payer: COMMERCIAL

## 2023-10-10 PROCEDURE — 90662 IIV NO PRSV INCREASED AG IM: CPT

## 2023-10-10 PROCEDURE — 91320 SARSCV2 VAC 30MCG TRS-SUC IM: CPT

## 2023-10-10 PROCEDURE — 90480 ADMN SARSCOV2 VAC 1/ONLY CMP: CPT

## 2023-10-10 PROCEDURE — G0008 ADMIN INFLUENZA VIRUS VAC: HCPCS

## 2023-12-31 DIAGNOSIS — R73.01 IMPAIRED FASTING GLUCOSE: ICD-10-CM

## 2023-12-31 DIAGNOSIS — K59.00 CONSTIPATION, UNSPECIFIED CONSTIPATION TYPE: ICD-10-CM

## 2023-12-31 DIAGNOSIS — G47.00 INSOMNIA, UNSPECIFIED TYPE: ICD-10-CM

## 2023-12-31 DIAGNOSIS — F03.918 DEMENTIA WITH BEHAVIORAL DISTURBANCE (H): ICD-10-CM

## 2024-01-02 RX ORDER — QUETIAPINE FUMARATE 25 MG/1
TABLET, FILM COATED ORAL
Qty: 180 TABLET | Refills: 0 | Status: SHIPPED | OUTPATIENT
Start: 2024-01-02 | End: 2024-04-01

## 2024-01-02 RX ORDER — DOCUSATE SODIUM 100 MG/1
CAPSULE, LIQUID FILLED ORAL
Qty: 60 CAPSULE | Refills: 1 | Status: SHIPPED | OUTPATIENT
Start: 2024-01-02 | End: 2024-03-20

## 2024-01-02 NOTE — TELEPHONE ENCOUNTER
Okay refills but needs to be seen in next couple months    Please inform patient    Arie Dahl MD

## 2024-01-31 ENCOUNTER — PATIENT OUTREACH (OUTPATIENT)
Dept: GERIATRIC MEDICINE | Facility: CLINIC | Age: 89
End: 2024-01-31
Payer: COMMERCIAL

## 2024-01-31 NOTE — PROGRESS NOTES
[facility] Care Coordination Contact    Called adult daughter Lamonte  to schedule annual HRA home visit. HRA has been scheduled for 2/13/24 at 3:00pm. Daughter is responsible party for member, and will interpret for him.     Lennie Saeed RN PHN  Care Coordinator  [facility]  O: [phone]  C:[phone]  F:[phone]

## 2024-02-14 ENCOUNTER — PATIENT OUTREACH (OUTPATIENT)
Dept: GERIATRIC MEDICINE | Facility: CLINIC | Age: 89
End: 2024-02-14
Payer: COMMERCIAL

## 2024-02-14 NOTE — PROGRESS NOTES
[facility] Care Coordination Contact    Called adult daughter Lamonte  to reschedule annual HRA home visit due to Care Coordinator illness. HRA has been scheduled for 2/27/24.    Lenine Saeed RN PHN  Care Coordinator  [facility]  O: [phone]  C:[phone]  F:[phone]

## 2024-02-27 ENCOUNTER — PATIENT OUTREACH (OUTPATIENT)
Dept: GERIATRIC MEDICINE | Facility: CLINIC | Age: 89
End: 2024-02-27
Payer: COMMERCIAL

## 2024-02-27 NOTE — Clinical Note
Hello- you are receiving the message because this member is on the state program MSHO/MSC+. As the Care Coordinator we are required to notify the Physician when we complete an annual assessment for this program. If you have further questions please contact me.  Lennie Saeed RN N [facility] Care Coordinator [phone]

## 2024-02-28 ASSESSMENT — LIFESTYLE VARIABLES
AUDIT-C TOTAL SCORE: 0
SKIP TO QUESTIONS 9-10: 1

## 2024-02-28 NOTE — COMMUNITY RESOURCES LIST (ENGLISH)
02/28/2024    [facility]  N/A  For questions about this resource list or additional care needs, please contact your primary care clinic or care manager.  Phone: [phone]   Email: N/A   Address: [address] [place] [id number]   Hours: N/A        Exercise and Recreation       Gym or workout facility  1  [employer] [facility] [place] Distance: 1.74 miles      In-Person   [address] [place] [address]  Language: English  Hours: Mon - Sun Open 24 Hours  Fees: Insurance, Self Pay, Sliding Fee   Phone: [phone] Email: [email or web address] Website: [email or web address]://[email or web address]/gyms/3547/mbocqx-pc-97285/     2  [employer] [facility] [place] Distance: 4.99 miles      In-Person   [address] [place] [address]  Language: English  Hours: Mon - Sun Open 24 Hours  Fees: Insurance, Self Pay, Sliding Fee   Phone: [phone] [phone] Website: https://wwwRail Yard/gyms/525/ptscpml-dr-85071/          Important Numbers & Websites       Emergency Services   911  [facility]   311  Poison Control   (487) 967-6315  Suicide Prevention Lifeline   (588) 330-6432 (TALK)  Child Abuse Hotline   (701) 963-1380 (4-A-Child)  Sexual Assault Hotline   (876) 632-1986 (HOPE)  National Runaway Safeline   (513) 333-1544 (RUNAWAY)  All-Options Talkline   (298) 356-6768  Substance Abuse Referral   (787) 830-5373 (HELP)

## 2024-02-28 NOTE — COMMUNITY RESOURCES LIST (PATIENT PREFERRED LANGUAGE)
02/28/2024   [facility]  N/A  N?u có th?c m?c v? gerald sách rosa ngmiracle này ho?c các jake c?u ch?m sóc b? sung, vui lòng liên h? v?i phòng khám ch?m sóc chính ho?c ng??i qu?n lý ch?m sóc c?a b?n.  Phone: [phone]   Email: N/A   Address: [address] [place] [address]   Hours: N/A        T?p th? d?c và gi?i trí       Phòng t?p th? d?c ho?c c? s? t?p luy?n  1  Th? hình m?i lúc - [place] Dannielle?ng cách: 1.74 d?m      M?t ??i m?t   [address] [place] [address]  Ngôn ng?: Ti?ng Tiffanie  Gi?: Th? vahe - ch? nh?t M? c?a 24 gi?  Phí: B?o hi?m, Phí tr??t, T? tr?   Phone: [phone] Email: [email or web address] Website: [email or web address]/[email or web address]/xdkuve-[email or web address]     2  T?p th? d?c m?i lúc - [place] Dannielle?ng cách: 4.99 d?m      M?t ??i m?t   [address] [place] [address]  Ngôn ng?: Ti?ng Tiffanie  Gi?: Th? vahe - ch? nh?t M? c?a 24 gi?  Phí: B?o hi?m, Phí tr??t, T? tr?   Phone: [phone] Website: [email or web address]://[email or web address]/Uevocs/525/gdwbxzb-yw-70978/          Nh?ng con s? và tereso web pete tr?ng       Các d?ch v? kh?n c?p   911  D?ch v? thành ph?   311  Ki?m soát ??c   [phone]  ???ng dây phòng ch?ng t? t?   [phone] (TALK)  ???ng dây nóng l?m d?ng tr? em   [phone] (4-A-Child)  ???ng dây nóng t?n công tình d?c   [phone] (HOPE)  ???ng dây ch?y tr?n qu?c garcia   [phone] (RUNAWAY)  T?t c? các tùy ch?n Talkline   [phone]  Gi?i thi?u l?m d?ng d??c ch?t   [phone] (HELP)

## 2024-03-02 DIAGNOSIS — R73.01 IMPAIRED FASTING GLUCOSE: ICD-10-CM

## 2024-03-04 DIAGNOSIS — E78.5 HYPERLIPIDEMIA WITH TARGET LDL LESS THAN 130: ICD-10-CM

## 2024-03-04 RX ORDER — ATORVASTATIN CALCIUM 10 MG/1
10 TABLET, FILM COATED ORAL DAILY
Qty: 90 TABLET | Refills: 0 | Status: SHIPPED | OUTPATIENT
Start: 2024-03-04 | End: 2024-06-03

## 2024-03-05 NOTE — PROGRESS NOTES
[facility] Care Coordination Contact    [facility] Home Visit Assessment     Home visit for Health Risk Assessment with Leona David completed on February 27, 2024    Type of residence:: Private home - stairs  Current living arrangement:: I live in a private home with family     Assessment completed with:: Patient, Children    Current Care Plan  Member currently receiving the following home care services:     Member currently receiving the following community resources: [facility], County Worker      Medication Review  Medication reconciliation completed in Epic: Yes  Medication set-up & administration: Family/informal caregiver sets up daily.  Family caregiver administers medications.  Medication Risk Assessment Medication (1 or more, place referral to MTM): N/A: No risk factors identified  MTM Referral Placed: No: No risk factors idenified    Mental/Behavioral Health   Depression Screening:           Mental health DX:: Yes        Falls Assessment:   Fallen 2 or more times in the past year?: No   Any fall with injury in the past year?: No    ADL/IADL Dependencies:   Dependent ADLs:: Ambulation-cane, Dressing, Grooming, Eating, Positioning, Toileting, Wheelchair-with assist, Incontinence  Dependent IADLs:: Laundry, Cleaning, Cooking, Shopping, Money Management, Medication Management, Meal Preparation, Transportation, Incontinence    [facility]: Shared information regarding [employer]. Reviewed preventative health screening and health plan supplemental benefits/incentives. Reviewed medication disposal form.    PCA Assessment completed at visit: Yes Annual PCA assessment indicated 8 hours per day of PCA. This is the same as the previous assessment.     Elderly Waiver Eligibility: Yes-will continue on EW    Care Plan & Recommendations: member cognition remains the same from last years assessment. He is upset and angry that his wife lives in a different home  then he. Daughter reports that this if for her safety and wellbeing. Member continues this year with PCA and Care Coordinator can authorize respite care as needed for member.     See [facility] for detailed assessment information.    Follow-Up Plan: Member informed of future contact, plan to f/u with member with a 6 month telephone assessment.  Contact information shared with member and family, encouraged member to call with any questions or concerns at any time.    [place] care continuum providers: Please see Snapshot and Care Management Flowsheets for Specific details of care plan.    This CC note routed to PCP, Arie Dahl RN PHN  Care Coordinator  [facility]  O: [phone]  C:[phone]  F:[phone]

## 2024-03-07 ENCOUNTER — PATIENT OUTREACH (OUTPATIENT)
Dept: GERIATRIC MEDICINE | Facility: CLINIC | Age: 89
End: 2024-03-07
Payer: COMMERCIAL

## 2024-03-07 NOTE — LETTER
March 7, 2024      THU X VU  [address]  [place] [address]      Dear Thu:    At [facility], we re dedicated to improving your health and wellness. Enclosed is the Care Plan developed with you on 2/27/24. Please review the Care Plan carefully.    As a reminder, during your visit we talked about:  Ways to manage your physical and mental health  Using health care to maintain and improve your health   Your preventive care needs     Remember to contact your care coordinator if you:  Are hospitalized, or plan to be hospitalized   Have a fall    Have a change in your physical or mental health  Need help finding support or services    If you have questions, or don t agree with your Care Plan, call me at [phone]. You can also call me if your needs change. TTY users, call the [place] Relay at (365) or 1-886.906.6466 (wjnuje-yo-mvltqp relay service).    Sincerely,      Lennie Saeed RN, PHN  [phone]  [email or web address]      [facility] ([facility]) is a health plan that contracts with both Medicare and the [place] Medical Assistance (Medicaid) program to provide benefits of both programs to enrollees. Enrollment in [facility] depends on contract renewal.    [id number] accepted    [id number] (07/2022)

## 2024-03-07 NOTE — PROGRESS NOTES
[facility] Care Coordination Contact    Received after visit chart from care coordinator.  Completed following tasks: Mailed copy of care plan/support plan to member, Mailed Safe Medication Disposal , and Updated services in Database  , Provider Signature - No POC Shared:  Member indicates that they do not want their POC shared with any EW providers.  [facility]:  Emailed required PCA documents to [facility].  Faxed copy of PCA assessment to PCA Agency and mailed copy to member.  Faxed MD Communication to PCP.       Marcy Agee  Care Management Specialist   [facility]   [phone]

## 2024-04-02 DIAGNOSIS — R73.01 IMPAIRED FASTING GLUCOSE: ICD-10-CM

## 2024-04-04 ENCOUNTER — TELEPHONE (OUTPATIENT)
Dept: OPHTHALMOLOGY | Facility: CLINIC | Age: 89
End: 2024-04-04
Payer: COMMERCIAL

## 2024-04-04 NOTE — TELEPHONE ENCOUNTER
Pharmacy requested refill of Prednisolone 1%. Patient hasn't been seen since 9/27/22, Was do back in 6 months for an intraocular pressure check, glaucoma OCT, retinal OCT, and [facility]. Has made appointments but had to cancel do to illness. Elmo talked to the daughter and she said that its hard to bring him to appointments now that hes 89 and said they are going to hold off on booking an appointment.  Will also forward to Dr. Colorado to review.

## 2024-04-08 ENCOUNTER — OFFICE VISIT (OUTPATIENT)
Dept: OPHTHALMOLOGY | Facility: CLINIC | Age: 89
End: 2024-04-08
Payer: COMMERCIAL

## 2024-04-08 DIAGNOSIS — H52.4 PRESBYOPIA: ICD-10-CM

## 2024-04-08 DIAGNOSIS — H18.20 CORNEAL EDEMA OF RIGHT EYE: ICD-10-CM

## 2024-04-08 DIAGNOSIS — Z94.7 HISTORY OF DESCEMET'S STRIPPING ENDOTHELIAL KERATOPLASTY (DSEK): ICD-10-CM

## 2024-04-08 DIAGNOSIS — H35.3121 EARLY DRY STAGE NONEXUDATIVE AGE-RELATED MACULAR DEGENERATION OF LEFT EYE: ICD-10-CM

## 2024-04-08 DIAGNOSIS — Z96.1 PSEUDOPHAKIA: ICD-10-CM

## 2024-04-08 DIAGNOSIS — E11.9 TYPE 2 DIABETES MELLITUS WITHOUT COMPLICATION, WITHOUT LONG-TERM CURRENT USE OF INSULIN (H): ICD-10-CM

## 2024-04-08 DIAGNOSIS — Z01.01 ENCOUNTER FOR EXAMINATION OF EYES AND VISION WITH ABNORMAL FINDINGS: Primary | ICD-10-CM

## 2024-04-08 DIAGNOSIS — H54.40 VISUAL LOSS, ONE EYE, NO LIGHT PERCEPTION (NLP): ICD-10-CM

## 2024-04-08 DIAGNOSIS — H40.1131 PRIMARY OPEN ANGLE GLAUCOMA (POAG) OF BOTH EYES, MILD STAGE: ICD-10-CM

## 2024-04-08 PROCEDURE — 92015 DETERMINE REFRACTIVE STATE: CPT | Performed by: OPHTHALMOLOGY

## 2024-04-08 PROCEDURE — 92014 COMPRE OPH EXAM EST PT 1/>: CPT | Performed by: OPHTHALMOLOGY

## 2024-04-08 RX ORDER — PREDNISOLONE ACETATE 10 MG/ML
1 SUSPENSION/ DROPS OPHTHALMIC EVERY MORNING
Qty: 5 ML | Refills: 3 | Status: CANCELLED | OUTPATIENT
Start: 2024-04-08

## 2024-04-08 ASSESSMENT — REFRACTION_WEARINGRX
OS_AXIS: 010
SPECS_TYPE: BIFOCAL
OS_ADD: +2.50
OS_CYLINDER: +0.25
OD_CYLINDER: +0.50
OD_AXIS: 010
OS_SPHERE: +0.25
OD_ADD: +2.50
OD_SPHERE: +0.25

## 2024-04-08 ASSESSMENT — REFRACTION_MANIFEST
OS_CYLINDER: +1.50
OS_SPHERE: -0.25
OS_AXIS: 175
OS_ADD: +3.00

## 2024-04-08 ASSESSMENT — CONF VISUAL FIELD
OS_SUPERIOR_NASAL_RESTRICTION: 0
OS_SUPERIOR_TEMPORAL_RESTRICTION: 0
OD_INFERIOR_TEMPORAL_RESTRICTION: 1
OD_SUPERIOR_TEMPORAL_RESTRICTION: 1
OS_NORMAL: 1
OD_SUPERIOR_NASAL_RESTRICTION: 1
OS_INFERIOR_NASAL_RESTRICTION: 0
OD_INFERIOR_NASAL_RESTRICTION: 1
OS_INFERIOR_TEMPORAL_RESTRICTION: 0

## 2024-04-08 ASSESSMENT — VISUAL ACUITY
OD_CC: NLP
CORRECTION_TYPE: GLASSES
OS_CC: 20/50
METHOD: SNELLEN - LINEAR
OS_CC+: +2

## 2024-04-08 ASSESSMENT — CUP TO DISC RATIO: OS_RATIO: 0.4

## 2024-04-08 ASSESSMENT — TONOMETRY
OS_IOP_MMHG: 13
OD_IOP_MMHG: 23
IOP_METHOD: APPLANATION

## 2024-04-08 ASSESSMENT — EXTERNAL EXAM - LEFT EYE: OS_EXAM: NORMAL

## 2024-04-08 ASSESSMENT — EXTERNAL EXAM - RIGHT EYE: OD_EXAM: NORMAL

## 2024-04-08 NOTE — LETTER
4/8/2024         RE: Leona David  [address]  [place] [address]        Dear Colleague,    Thank you for referring your patient, Leona David, [facility]. Please see a copy of my visit note below.     Current Eye Medications:  Prednisolone every day right eye. Has been refusing all other drops per daughter.      Subjective:  here for vision loss today. Hasn't been in since 2022, refusing appointments when his sight gets better and then it gets worse and doesn't want to go. Cycle of non compliance.  Right eye is no good per patient, left eye is the only one that sees anything.  No flashes or floaters, but does have dementia, so he is seeing ants and bugs at night that aren't there. No pain today. Daughter Lamonte is with today, interpreting today.      Hemoglobin A1C   Date Value Ref Range Status   04/27/2022 5.8 (H) 0.0 - 5.6 % Final     Comment:     Normal <5.7%   Prediabetes 5.7-6.4%    Diabetes 6.5% or higher     Note: Adopted from ADA consensus guidelines.   11/22/2017 8.0 (H) 4.3 - 6.0 % Final         Objective:  See Ophthalmology Exam.       Assessment:  NLP right eye; stable intraocular pressure and discs left eye in patient who is diabetic.  No diabetic retinopathy left eye.      ICD-10-CM    1. Encounter for examination of eyes and vision with abnormal findings  Z01.01       2. Presbyopia  H52.4       3. Type 2 diabetes mellitus without complication, without long-term current use of insulin (H)  E11.9       4. Visual loss, one eye, no light perception (NLP), od  H54.40       5. Corneal edema of right eye  H18.20       6. Primary open angle glaucoma (POAG) of both eyes, mild stage  H40.1131       7. Pseudophakia, ou; Yag Caps, os; od?  Z96.1       8. History of Descemet's stripping endothelial keratoplasty (DSEK), od  Z94.7       9. Early dry stage nonexudative age-related macular degeneration of left eye  H35.3121            Plan:  Okay to discontinue all drops in the right  eye (Prednisolone, Brimonidine, and Cosopt) -- there is no vision or light perception in the right eye. Call if there is pain in the right eye. Can use Macarena Ointment in the right eye for pain.    Continue:   Cosopt (dorzolamide-timolol -- dark blue top) twice daily in the left eye. About 12 hours apart.   Brimonidine (purple top) twice daily in the left eye. About 12 hours apart.     Wait at least 5 minutes between drops if using more than one at a time.     Return visit in 6 months for an intraocular pressure check.     Rodney Colorado M.D.  [phone]          Again, thank you for allowing me to participate in the care of your patient.        Sincerely,        Rodney Colorado MD

## 2024-04-08 NOTE — PATIENT INSTRUCTIONS
Okay to discontinue all drops in the right eye (Prednisolone, Brimonidine, and Cosopt) -- there is no vision or light perception in the right eye. Call if there is pain in the right eye. Can use Macarena Ointment in the right eye for pain.    Continue:   Cosopt (dorzolamide-timolol -- dark blue top) twice daily in the left eye. About 12 hours apart.   Brimonidine (purple top) twice daily in the left eye. About 12 hours apart.     Wait at least 5 minutes between drops if using more than one at a time.     Return visit in 6 months for an intraocular pressure check.     Rodney Colorado M.D.  [phone]      Patient Education   Diabetes weakens the blood vessels all over the body, including the eyes. Damage to the blood vessels in the eyes can cause swelling or bleeding into part of the eye (called the retina). This is called diabetic retinopathy (KATLYN-tin--[facility]-the). If not treated, this disease can cause vision loss or blindness.   Symptoms may include blurred or distorted vision, but many people have no symptoms. It's important to see your eye doctor regularly to check for problems.   Early treatment and good control can help protect your vision. Here are the things you can do to help prevent vision loss:      1. Keep your blood sugar levels under tight control.      2. Bring high blood pressure under control.      3. No smoking.      4. Have yearly dilated eye exams.

## 2024-04-08 NOTE — PROGRESS NOTES
Current Eye Medications:  Prednisolone every day right eye. Has been refusing all other drops per daughter.      Subjective:  here for vision loss today. Hasn't been in since 2022, refusing appointments when his sight gets better and then it gets worse and doesn't want to go. Cycle of non compliance.  Right eye is no good per patient, left eye is the only one that sees anything.  No flashes or floaters, but does have dementia, so he is seeing ants and bugs at night that aren't there. No pain today. Daughter Lamonte is with today, interpreting today.      Hemoglobin A1C   Date Value Ref Range Status   04/27/2022 5.8 (H) 0.0 - 5.6 % Final     Comment:     Normal <5.7%   Prediabetes 5.7-6.4%    Diabetes 6.5% or higher     Note: Adopted from ADA consensus guidelines.   11/22/2017 8.0 (H) 4.3 - 6.0 % Final         Objective:  See Ophthalmology Exam.       Assessment:  NLP right eye; stable intraocular pressure and discs left eye in patient who is diabetic.  No diabetic retinopathy left eye.      ICD-10-CM    1. Encounter for examination of eyes and vision with abnormal findings  Z01.01       2. Presbyopia  H52.4       3. Type 2 diabetes mellitus without complication, without long-term current use of insulin (H)  E11.9       4. Visual loss, one eye, no light perception (NLP), od  H54.40       5. Corneal edema of right eye  H18.20       6. Primary open angle glaucoma (POAG) of both eyes, mild stage  H40.1131       7. Pseudophakia, ou; Yag Caps, os; od?  Z96.1       8. History of Descemet's stripping endothelial keratoplasty (DSEK), od  Z94.7       9. Early dry stage nonexudative age-related macular degeneration of left eye  H35.3121            Plan:  Okay to discontinue all drops in the right eye (Prednisolone, Brimonidine, and Cosopt) -- there is no vision or light perception in the right eye. Call if there is pain in the right eye. Can use Macarena Ointment in the right eye for pain.    Continue:   Cosopt (dorzolamide-timolol --  dark blue top) twice daily in the left eye. About 12 hours apart.   Brimonidine (purple top) twice daily in the left eye. About 12 hours apart.     Wait at least 5 minutes between drops if using more than one at a time.     Return visit in 6 months for an intraocular pressure check.     Rodney Colorado M.D.  [phone]

## 2024-04-15 DIAGNOSIS — H40.1131 PRIMARY OPEN ANGLE GLAUCOMA (POAG) OF BOTH EYES, MILD STAGE: ICD-10-CM

## 2024-04-15 RX ORDER — BRIMONIDINE TARTRATE 1.5 MG/ML
1 SOLUTION/ DROPS OPHTHALMIC 2 TIMES DAILY
Qty: 10 ML | Refills: 3 | Status: SHIPPED | OUTPATIENT
Start: 2024-04-15

## 2024-04-20 PROBLEM — H54.40 VISUAL LOSS, ONE EYE, NO LIGHT PERCEPTION (NLP): Status: ACTIVE | Noted: 2024-04-20

## 2024-05-30 DIAGNOSIS — H40.1131 PRIMARY OPEN ANGLE GLAUCOMA (POAG) OF BOTH EYES, MILD STAGE: ICD-10-CM

## 2024-05-30 RX ORDER — DORZOLAMIDE HYDROCHLORIDE AND TIMOLOL MALEATE 20; 5 MG/ML; MG/ML
1 SOLUTION/ DROPS OPHTHALMIC 2 TIMES DAILY
Qty: 10 ML | Refills: 4 | Status: SHIPPED | OUTPATIENT
Start: 2024-05-30

## 2024-05-30 NOTE — TELEPHONE ENCOUNTER
Received refill request for Cosopt. Says both eyes BID, but in the chart it says BID left eye only. Will update the script for patient and send to Dr. Colorado for refilling.

## 2024-06-03 DIAGNOSIS — E78.5 HYPERLIPIDEMIA WITH TARGET LDL LESS THAN 130: ICD-10-CM

## 2024-06-03 DIAGNOSIS — N40.1 BENIGN PROSTATIC HYPERPLASIA WITH LOWER URINARY TRACT SYMPTOMS, SYMPTOM DETAILS UNSPECIFIED: ICD-10-CM

## 2024-06-03 RX ORDER — ATORVASTATIN CALCIUM 10 MG/1
10 TABLET, FILM COATED ORAL DAILY
Qty: 90 TABLET | Refills: 0 | Status: SHIPPED | OUTPATIENT
Start: 2024-06-03 | End: 2024-06-10

## 2024-06-03 RX ORDER — TAMSULOSIN HYDROCHLORIDE 0.4 MG/1
CAPSULE ORAL
Qty: 90 CAPSULE | Refills: 0 | Status: SHIPPED | OUTPATIENT
Start: 2024-06-03 | End: 2024-06-10

## 2024-06-03 NOTE — TELEPHONE ENCOUNTER
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     Please advise     Thank you  LS

## 2024-06-03 NOTE — TELEPHONE ENCOUNTER
Noted    At some point patient may need to change providers    With his medications he should be seen once per year at least     Please inform patient/family    Arie Dahl MD

## 2024-06-03 NOTE — TELEPHONE ENCOUNTER
I called and spoke to patient's daughter and informed her of the message below.  She will try to get the patient to come in for a visit  Jamila Mcmanus CMA

## 2024-06-10 ENCOUNTER — OFFICE VISIT (OUTPATIENT)
Dept: FAMILY MEDICINE | Facility: CLINIC | Age: 89
End: 2024-06-10
Payer: COMMERCIAL

## 2024-06-10 VITALS
TEMPERATURE: 97.2 F | OXYGEN SATURATION: 99 % | WEIGHT: 129.8 LBS | DIASTOLIC BLOOD PRESSURE: 54 MMHG | RESPIRATION RATE: 18 BRPM | HEART RATE: 78 BPM | BODY MASS INDEX: 25.48 KG/M2 | HEIGHT: 60 IN | SYSTOLIC BLOOD PRESSURE: 148 MMHG

## 2024-06-10 DIAGNOSIS — F03.918 DEMENTIA WITH BEHAVIORAL DISTURBANCE (H): ICD-10-CM

## 2024-06-10 DIAGNOSIS — G47.00 INSOMNIA, UNSPECIFIED TYPE: ICD-10-CM

## 2024-06-10 DIAGNOSIS — N40.1 BENIGN PROSTATIC HYPERPLASIA WITH LOWER URINARY TRACT SYMPTOMS, SYMPTOM DETAILS UNSPECIFIED: ICD-10-CM

## 2024-06-10 DIAGNOSIS — E11.9 TYPE 2 DIABETES MELLITUS WITHOUT COMPLICATION, WITHOUT LONG-TERM CURRENT USE OF INSULIN (H): ICD-10-CM

## 2024-06-10 DIAGNOSIS — R53.83 FATIGUE, UNSPECIFIED TYPE: ICD-10-CM

## 2024-06-10 DIAGNOSIS — Z00.00 ROUTINE GENERAL MEDICAL EXAMINATION AT A HEALTH CARE FACILITY: ICD-10-CM

## 2024-06-10 DIAGNOSIS — I10 HYPERTENSION GOAL BP (BLOOD PRESSURE) < 140/90: ICD-10-CM

## 2024-06-10 DIAGNOSIS — E78.5 HYPERLIPIDEMIA WITH TARGET LDL LESS THAN 130: ICD-10-CM

## 2024-06-10 DIAGNOSIS — R73.01 IMPAIRED FASTING GLUCOSE: ICD-10-CM

## 2024-06-10 DIAGNOSIS — Z00.00 ENCOUNTER FOR MEDICARE ANNUAL WELLNESS EXAM: Primary | ICD-10-CM

## 2024-06-10 LAB
BASOPHILS # BLD AUTO: 0 10E3/UL (ref 0–0.2)
BASOPHILS NFR BLD AUTO: 1 %
CREAT UR-MCNC: 147 MG/DL
EOSINOPHIL # BLD AUTO: 0.2 10E3/UL (ref 0–0.7)
EOSINOPHIL NFR BLD AUTO: 6 %
ERYTHROCYTE [DISTWIDTH] IN BLOOD BY AUTOMATED COUNT: 13.1 % (ref 10–15)
HBA1C MFR BLD: 6 % (ref 0–5.6)
HCT VFR BLD AUTO: 41.6 % (ref 40–53)
HGB BLD-MCNC: 13.5 G/DL (ref 13.3–17.7)
IMM GRANULOCYTES # BLD: 0 10E3/UL
IMM GRANULOCYTES NFR BLD: 0 %
LYMPHOCYTES # BLD AUTO: 1 10E3/UL (ref 0.8–5.3)
LYMPHOCYTES NFR BLD AUTO: 24 %
MCH RBC QN AUTO: 31.1 PG (ref 26.5–33)
MCHC RBC AUTO-ENTMCNC: 32.5 G/DL (ref 31.5–36.5)
MCV RBC AUTO: 96 FL (ref 78–100)
MICROALBUMIN UR-MCNC: <12 MG/L
MICROALBUMIN/CREAT UR: NORMAL MG/G{CREAT}
MONOCYTES # BLD AUTO: 0.4 10E3/UL (ref 0–1.3)
MONOCYTES NFR BLD AUTO: 11 %
NEUTROPHILS # BLD AUTO: 2.4 10E3/UL (ref 1.6–8.3)
NEUTROPHILS NFR BLD AUTO: 59 %
PLATELET # BLD AUTO: 219 10E3/UL (ref 150–450)
RBC # BLD AUTO: 4.34 10E6/UL (ref 4.4–5.9)
WBC # BLD AUTO: 4 10E3/UL (ref 4–11)

## 2024-06-10 PROCEDURE — 83036 HEMOGLOBIN GLYCOSYLATED A1C: CPT | Performed by: FAMILY MEDICINE

## 2024-06-10 PROCEDURE — 82570 ASSAY OF URINE CREATININE: CPT | Performed by: FAMILY MEDICINE

## 2024-06-10 PROCEDURE — 85025 COMPLETE CBC W/AUTO DIFF WBC: CPT | Performed by: FAMILY MEDICINE

## 2024-06-10 PROCEDURE — 84443 ASSAY THYROID STIM HORMONE: CPT | Performed by: FAMILY MEDICINE

## 2024-06-10 PROCEDURE — 80061 LIPID PANEL: CPT | Performed by: FAMILY MEDICINE

## 2024-06-10 PROCEDURE — 36415 COLL VENOUS BLD VENIPUNCTURE: CPT | Performed by: FAMILY MEDICINE

## 2024-06-10 PROCEDURE — 82043 UR ALBUMIN QUANTITATIVE: CPT | Performed by: FAMILY MEDICINE

## 2024-06-10 PROCEDURE — G0438 PPPS, INITIAL VISIT: HCPCS | Performed by: FAMILY MEDICINE

## 2024-06-10 PROCEDURE — 80053 COMPREHEN METABOLIC PANEL: CPT | Performed by: FAMILY MEDICINE

## 2024-06-10 RX ORDER — RESPIRATORY SYNCYTIAL VIRUS VACCINE 120MCG/0.5
0.5 KIT INTRAMUSCULAR ONCE
Qty: 1 EACH | Refills: 0 | Status: CANCELLED | OUTPATIENT
Start: 2024-06-10 | End: 2024-06-10

## 2024-06-10 RX ORDER — QUETIAPINE FUMARATE 25 MG/1
TABLET, FILM COATED ORAL
Qty: 180 TABLET | Refills: 3 | Status: SHIPPED | OUTPATIENT
Start: 2024-06-10

## 2024-06-10 RX ORDER — ATORVASTATIN CALCIUM 10 MG/1
10 TABLET, FILM COATED ORAL DAILY
Qty: 90 TABLET | Refills: 3 | Status: SHIPPED | OUTPATIENT
Start: 2024-06-10

## 2024-06-10 RX ORDER — TAMSULOSIN HYDROCHLORIDE 0.4 MG/1
0.4 CAPSULE ORAL DAILY
Qty: 90 CAPSULE | Refills: 3 | Status: SHIPPED | OUTPATIENT
Start: 2024-06-10

## 2024-06-10 SDOH — HEALTH STABILITY: PHYSICAL HEALTH: ON AVERAGE, HOW MANY DAYS PER WEEK DO YOU ENGAGE IN MODERATE TO STRENUOUS EXERCISE (LIKE A BRISK WALK)?: 0 DAYS

## 2024-06-10 ASSESSMENT — SOCIAL DETERMINANTS OF HEALTH (SDOH): HOW OFTEN DO YOU GET TOGETHER WITH FRIENDS OR RELATIVES?: ONCE A WEEK

## 2024-06-10 NOTE — LETTER
June 12, 2024    u BOOM David  [address]   [place] [address]          Dear ,    We are writing to inform you of your test results.  Triglycerides high but cholesterol is normal.     The high urea nitrogen indicates dehydration.  Increase fluid intake.     Hemoglobin a1c ( diabetes test ) is fine.     Other labs are good.       Resulted Orders   HEMOGLOBIN A1C   Result Value Ref Range    Hemoglobin A1C 6.0 (H) 0.0 - 5.6 %      Comment:      Normal <5.7%   Prediabetes 5.7-6.4%    Diabetes 6.5% or higher     Note: Adopted from ADA consensus guidelines.   Lipid panel reflex to direct LDL Non-fasting   Result Value Ref Range    Cholesterol 140 <200 mg/dL    Triglycerides 294 (H) <150 mg/dL    Direct Measure HDL 37 (L) >=40 mg/dL    LDL Cholesterol Calculated 44 <=100 mg/dL    Non HDL Cholesterol 103 <130 mg/dL    Patient Fasting > 8hrs? No     Narrative    Cholesterol  Desirable:  <200 mg/dL    Triglycerides  Normal:  Less than 150 mg/dL  Borderline High:  150-199 mg/dL  High:  200-499 mg/dL  Very High:  Greater than or equal to 500 mg/dL    Direct Measure HDL  Female:  Greater than or equal to 50 mg/dL   Male:  Greater than or equal to 40 mg/dL    LDL Cholesterol  Desirable:  <100mg/dL  Above Desirable:  100-129 mg/dL   Borderline High:  130-159 mg/dL   High:  160-189 mg/dL   Very High:  >= 190 mg/dL    Non HDL Cholesterol  Desirable:  130 mg/dL  Above Desirable:  130-159 mg/dL  Borderline High:  160-189 mg/dL  High:  190-219 mg/dL  Very High:  Greater than or equal to 220 mg/dL   Comprehensive metabolic panel   Result Value Ref Range    Sodium 140 135 - 145 mmol/L      Comment:      Reference intervals for this test were updated on 09/26/2023 to more accurately reflect our healthy population. There may be differences in the flagging of prior results with similar values performed with this method. Interpretation of those prior results can be made in the context of the updated reference intervals.      Potassium 4.2 3.4 - 5.3 mmol/L    Carbon Dioxide (CO2) 26 22 - 29 mmol/L    Anion Gap 9 7 - 15 mmol/L    Urea Nitrogen 32.2 (H) 8.0 - 23.0 mg/dL    Creatinine 0.90 0.67 - 1.17 mg/dL    GFR Estimate 80 >60 mL/min/1.73m2    Calcium 9.0 8.2 - 9.6 mg/dL    Chloride 105 98 - 107 mmol/L    Glucose 137 (H) 70 - 99 mg/dL    Alkaline Phosphatase 91 40 - 150 U/L    AST 24 0 - 45 U/L      Comment:      Reference intervals for this test were updated on 6/12/2023 to more accurately reflect our healthy population. There may be differences in the flagging of prior results with similar values performed with this method. Interpretation of those prior results can be made in the context of the updated reference intervals.    ALT 14 0 - 70 U/L      Comment:      Reference intervals for this test were updated on 6/12/2023 to more accurately reflect our healthy population. There may be differences in the flagging of prior results with similar values performed with this method. Interpretation of those prior results can be made in the context of the updated reference intervals.      Protein Total 6.8 6.4 - 8.3 g/dL    Albumin 3.9 3.5 - 5.2 g/dL    Bilirubin Total 0.2 <=1.2 mg/dL    Patient Fasting > 8hrs? No    TSH with free T4 reflex   Result Value Ref Range    TSH 1.13 0.30 - 4.20 uIU/mL   CBC with platelets and differential   Result Value Ref Range    WBC Count 4.0 4.0 - 11.0 10e3/uL    RBC Count 4.34 (L) 4.40 - 5.90 10e6/uL    Hemoglobin 13.5 13.3 - 17.7 g/dL    Hematocrit 41.6 40.0 - 53.0 %    MCV 96 78 - 100 fL    MCH 31.1 26.5 - 33.0 pg    MCHC 32.5 31.5 - 36.5 g/dL    RDW 13.1 10.0 - 15.0 %    Platelet Count 219 150 - 450 10e3/uL    % Neutrophils 59 %    % Lymphocytes 24 %    % Monocytes 11 %    % Eosinophils 6 %    % Basophils 1 %    % Immature Granulocytes 0 %    Absolute Neutrophils 2.4 1.6 - 8.3 10e3/uL    Absolute Lymphocytes 1.0 0.8 - 5.3 10e3/uL    Absolute Monocytes 0.4 0.0 - 1.3 10e3/uL    Absolute Eosinophils 0.2 0.0 -  0.7 10e3/uL    Absolute Basophils 0.0 0.0 - 0.2 10e3/uL    Absolute Immature Granulocytes 0.0 <=0.4 10e3/uL   Albumin Random Urine Quantitative with Creat Ratio   Result Value Ref Range    Creatinine Urine mg/dL 147.0 mg/dL      Comment:      The reference ranges have not been established in urine creatinine. The results should be integrated into the clinical context for interpretation.    Albumin Urine mg/L <12.0 mg/L      Comment:      The reference ranges have not been established in urine albumin. The results should be integrated into the clinical context for interpretation.    Albumin Urine mg/g Cr        Comment:      Unable to calculate, urine albumin and/or urine creatinine is outside detectable limits.  Microalbuminuria is defined as an albumin:creatinine ratio of 17 to 299 for males and 25 to 299 for females. A ratio of albumin:creatinine of 300 or higher is indicative of overt proteinuria.  Due to biologic variability, positive results should be confirmed by a second, first-morning random or 24-hour timed urine specimen. If there is discrepancy, a third specimen is recommended. When 2 out of 3 results are in the microalbuminuria range, this is evidence for incipient nephropathy and warrants increased efforts at glucose control, blood pressure control, and institution of therapy with an angiotensin-converting-enzyme (ACE) inhibitor (if the patient can tolerate it).         If you have any questions or concerns, please call the clinic at the number listed above.       Sincerely,      Arie Dahl MD

## 2024-06-10 NOTE — PROGRESS NOTES
Preventive Care Visit  [facility] BRIAN Dahl MD, Family Medicine  Mo 10, 2024          Subjective   Thu is a 89 year old, presenting for the following:  Medicare Visit        6/10/2024     1:49 PM   Additional Questions   Roomed by Jen         Health Care Directive  Patient does not have a Health Care Directive or Living Will: Discussed advance care planning with patient; however, patient declined at this time.    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 Good   Feel stress (tense, anxious, or unable to sleep) Rather much   (!) STRESS CONCERN      6/10/2024   Nutrition   Diet: Regular (no restrictions)         6/10/2024   Exercise   Days per week of moderate/strenous exercise 0 days   (!) EXERCISE CONCERN      6/10/2024   Social Factors   Frequency of gathering with friends or relatives Once a week   Worry food won't last until get money to buy more No   Food not last or not have enough money for food? No   Do you have housing?  Yes   Are you worried about losing your housing? No   Lack of transportation? No   Unable to get utilities (heat,electricity)? No         6/10/2024   Fall Risk   Fallen 2 or more times in the past year? No   Trouble with walking or balance? Yes   Reason Gait Speed Test Not Completed Patient does not tolerate an upright or standing position (e.g. wheelchair)          6/10/2024   Activities of Daily Living- Home Safety   Needs help with the following daily activites Telephone use    Transportation    Shopping    Preparing meals    Housework    Laundry    Medication administration    Money management   Safety concerns in the home None of the above          6/10/2024   Dental   Dentist two times every year? (!) NO         6/10/2024   Hearing Screening   Hearing concerns? (!) I FEEL THAT PEOPLE ARE MUMBLING OR NOT SPEAKING CLEARLY.    (!) I NEED TO ASK PEOPLE TO SPEAK UP OR REPEAT THEMSELVES.    (!) IT'S HARD TO FOLLOW A CONVERSATION IN A NOISY RESTAURANT OR CROWDED ROOM.    (!) TROUBLE UNDERSTANDING SOFT OR WHISPERED SPEECH.    (!) TROUBLE UNDERSTANDING SPEECH ON THE TELEPHONE         6/10/2024   Driving Risk Screening   Patient/family members have concerns about driving No         6/10/2024   General Alertness/Fatigue Screening   Have you been more tired than usual lately? (!) YES         6/10/2024   Urinary Incontinence Screening   Bothered by leaking urine in past 6 months Yes         6/10/2024   TB Screening   Were you born outside of the [place]? Yes         Today's PHQ-2 Score:       6/10/2024     1:50 PM   PHQ-2 (  [employer])   Q1: Little interest or pleasure in doing things 1   Q2: Feeling down, depressed or hopeless 1   PHQ-2 Score 2   Q1: Little interest or pleasure in doing things Several days   Q2: Feeling down, depressed or hopeless Several days   PHQ-2 Score 2           6/10/2024   Substance Use   Alcohol more than 3/day or more than 7/wk Not Applicable   Do you have a current opioid prescription? No   How severe/bad is pain from 1 to 10? 0/10 (No Pain)   Do you use any other substances recreationally? No     Social History     Tobacco Use    Smoking status: Former     Current packs/day: 0.00     Types: Cigarettes     Quit date: 9/3/1994     Years since quittin.7    Smokeless tobacco: Never   Vaping Use    Vaping status: Never Used   Substance Use Topics    Alcohol use: Yes     Comment: wine    Drug use: No             Reviewed and updated as needed this visit by Provider                       Current providers sharing in care for this patient include:  Patient Care Team:  Arie Dahl MD as PCP - General (Family Practice)  Arie Dahl,  MD (Family Practice)  Arie Dahl MD as Assigned PCP  Rodney Colorado MD as MD (Ophthalmology)  Rodney Colorado MD as Assigned Surgical Provider  Lennie Saeed, RN as Lead Care Coordinator    The following health maintenance items are reviewed in Epic and correct as of today:  Health Maintenance   Topic Date Due    MICROALBUMIN  Never done    DIABETIC FOOT EXAM  Never done    ANNUAL REVIEW OF HM ORDERS  Never done    RSV VACCINE (Pregnancy & 60+) (1 - 1-dose 60+ series) Never done    ZOSTER IMMUNIZATION (2 of 3) 08/28/2009    A1C  07/27/2022    MEDICARE ANNUAL WELLNESS VISIT  04/27/2023    BMP  04/27/2023    LIPID  04/27/2023    COVID-19 Vaccine (7 - 2023-24 season) 02/10/2024    EYE EXAM  04/08/2025    FALL RISK ASSESSMENT  06/10/2025    ADVANCE CARE PLANNING  04/27/2027    DTAP/TDAP/TD IMMUNIZATION (2 - Td or Tdap) 11/01/2031    PHQ-2 (once per calendar year)  Completed    INFLUENZA VACCINE  Completed    Pneumococcal Vaccine: 65+ Years  Completed    IPV IMMUNIZATION  Aged Out    HPV IMMUNIZATION  Aged Out    MENINGITIS IMMUNIZATION  Aged Out    RSV MONOCLONAL ANTIBODY  Aged Out       Has seen neurology in past    History dementia         Objective    Exam  BP (!) 162/75 (BP Location: Right arm, Patient Position: Sitting, Cuff Size: Adult Regular)   Pulse 78   Temp 97.2  F (36.2  C) (Temporal)   Resp 18   Ht 1.524 m (5')   Wt 58.9 kg (129 lb 12.8 oz)   SpO2 99%   BMI 25.35 kg/m     Estimated body mass index is 25.35 kg/m  as calculated from the following:    Height as of this encounter: 1.524 m (5').    Weight as of this encounter: 58.9 kg (129 lb 12.8 oz).    Physical Exam  GENERAL: alert and no distress  EYES: Eyes grossly normal to inspection, PERRL and conjunctivae and sclerae normal  HENT: ear canals and TM's normal, nose and mouth without ulcers or lesions  NECK: no adenopathy, no asymmetry, masses, or scars  RESP: lungs clear to auscultation - no rales, rhonchi or wheezes  CV: regular  rate and rhythm, normal S1 S2, no S3 or S4, no murmur, click or rub, no peripheral edema  ABDOMEN: soft, nontender, no hepatosplenomegaly, no masses and bowel sounds normal  MS: no gross musculoskeletal defects noted, no edema  SKIN: no suspicious lesions or rashes  NEURO: Normal strength and tone, mentation intact and speech normal  PSYCH: mentation appears normal, affect normal/bright  Patient is able to walk to exam table but very slowly, almost parkinsonia type gait.  Also has tremor but not con        6/10/2024   Mini Cog   Mini-Cog Not Completed (choose reason) Language barrier and no  present            1. Encounter for Medicare annual wellness exam    2. Routine general medical examination at a health care facility    3. Type 2 diabetes mellitus without complication, without long-term current use of insulin (H)    4. Hypertension goal BP (blood pressure) < 140/90    5. Hyperlipidemia with target LDL less than 130    6. Fatigue, unspecified type    7. Impaired fasting glucose    8. Dementia with behavioral disturbance (H)    9. Insomnia, unspecified type    10. Benign prostatic hyperplasia with lower urinary tract symptoms, symptom details unspecified      Discussed several issues with patient and daughter  He has seen neurology in past. They declined new referral for this.  Encouraged more walking/ activity.  Check labs.  Refill meds.  See AVS         Signed Electronically by: Arie Dahl MD

## 2024-06-10 NOTE — PATIENT INSTRUCTIONS
"We will send you lab results    Refills available as needed    Increase walking as able    See dentist     See eye doctor    Could get RSV and shingles shots at pharmacy           Preventive Care Advice   This is general advice we often give to help people stay healthy. Your care team may have specific advice just for you. Please talk to your care team about your own preventive care needs.  Lifestyle  Exercise at least 150 minutes each week (30 minutes a day, 5 days a week).  Do muscle strengthening activities 2 days a week. These help control your weight and prevent disease.  No smoking.  Wear sunscreen to prevent skin cancer.  Have your home tested for radon every 2 to 5 years. Radon is a colorless, odorless gas that can harm your lungs. To learn more, go to www.health.[facility].mn.us and search for \"Radon in Homes.\"  Keep guns unloaded and locked up in a safe place like a safe or gun vault, or, use a gun lock and hide the keys. Always lock away bullets separately. To learn more, visit [place].gov and search for \"safe gun storage.\"  Nutrition  Eat 5 or more servings of fruits and vegetables each day.  Try wheat bread, brown rice and whole grain pasta (instead of white bread, rice, and pasta).  Get enough calcium and vitamin D. Check the label on foods and aim for 100% of the RDA (recommended daily allowance).  Regular exams  Have a dental exam and cleaning every 6 months.  See your health care team every year to talk about:  Any changes in your health.  Any medicines your care team has prescribed.  Preventive care, family planning, and ways to prevent chronic diseases.  Shots (vaccines)   HPV shots (up to age 26), if you've never had them before.  Hepatitis B shots (up to age 59), if you've never had them before.  COVID-19 shot: Get this shot when it's due.  Flu shot: Get a flu shot every year.  Tetanus shot: Get a tetanus shot every 10 years.  Pneumococcal, hepatitis A, and RSV shots: Ask your care team if you need " these based on your risk.  Shingles shot (for age 50 and up).  General health tests  Diabetes screening:  Starting at age 35, Get screened for diabetes at least every 3 years.  If you are younger than age 35, ask your care team if you should be screened for diabetes.  Cholesterol test: At age 39, start having a cholesterol test every 5 years, or more often if advised.  Bone density scan (DEXA): At age 50, ask your care team if you should have this scan for osteoporosis (brittle bones).  Hepatitis C: Get tested at least once in your life.  Abdominal aortic aneurysm screening: Talk to your doctor about having this screening if you:  Have ever smoked; and  Are biologically male; and  Are between the ages of 65 and 75.  STIs (sexually transmitted infections)  Before age 24: Ask your care team if you should be screened for STIs.  After age 24: Get screened for STIs if you're at risk. You are at risk for STIs (including HIV) if:  You are sexually active with more than one person.  You don't use condoms every time.  You or a partner was diagnosed with a sexually transmitted infection.  If you are at risk for HIV, ask about PrEP medicine to prevent HIV.  Get tested for HIV at least once in your life, whether you are at risk for HIV or not.  Cancer screening tests  Cervical cancer screening: If you have a cervix, begin getting regular cervical cancer screening tests at age 21. Most people who have regular screenings with normal results can stop after age 65. Talk about this with your provider.  Breast cancer scan (mammogram): If you've ever had breasts, begin having regular mammograms starting at age 40. This is a scan to check for breast cancer.  Colon cancer screening: It is important to start screening for colon cancer at age 45.  Have a colonoscopy test every 10 years (or more often if you're at risk) Or, ask your provider about stool tests like a FIT test every year or Cologuard test every 3 years.  To learn more about  your testing options, visit: www.[employer]/295546.pdf.  For help making a decision, visit: mary ann.[email or web address]/yc28744.  Prostate cancer screening test: If you have a prostate and are age 55 to 69, ask your provider if you would benefit from a yearly prostate cancer screening test.  Lung cancer screening: If you are a current or former smoker age 50 to 80, ask your care team if ongoing lung cancer screenings are right for you.  For informational purposes only. Not to replace the advice of your health care provider. Copyright   2023 [employer]. All rights reserved. Clinically reviewed by the [facility]. [employer] 532489 - REV 04/24.    Learning About Activities of Daily Living  What are activities of daily living?     Activities of daily living (ADLs) are the basic self-care tasks you do every day. These include eating, bathing, dressing, and moving around.  As you age, and if you have health problems, you may find that it's harder to do some of these tasks. If so, your doctor can suggest ideas that may help.  To measure what kind of help you may need, your doctor will ask how well you are able to do ADLs. Let your doctor know if there are any tasks that you are having trouble doing. This is an important first step to getting help. And when you have the help you need, you can stay as independent as possible.  How will a doctor assess your ADLs?  Asking about ADLs is part of a routine health checkup your doctor will likely do as you age. Your health check might be done in a doctor's office, in your home, or at a hospital. The goal is to find out if you are having any problems that could make it hard to care for yourself or that make it unsafe for you to be on your own.  To measure your ADLs, your doctor will ask how hard it is for you to do routine tasks. Your doctor may also want to know if you have changed the way you do a task because of a health problem. Your doctor may watch how  you:  Walk back and forth.  Keep your balance while you stand or walk.  Move from sitting to standing or from a bed to a chair.  Button or unbutton a shirt or sweater.  Remove and put on your shoes.  It's common to feel a little worried or anxious if you find you can't do all the things you used to be able to do. Talking with your doctor about ADLs is a way to make sure you're as safe as possible and able to care for yourself as well as you can. You may want to bring a caregiver, friend, or family member to your checkup. They can help you talk to your doctor.  Follow-up care is a key part of your treatment and safety. Be sure to make and go to all appointments, and call your doctor if you are having problems. It's also a good idea to know your test results and keep a list of the medicines you take.  Current as of: October 24, 2023               Content Version: 14.0    7451-7690 [employer].   Care instructions adapted under license by your healthcare professional. If you have questions about a medical condition or this instruction, always ask your healthcare professional. [employer] disclaims any warranty or liability for your use of this information.      Preventing Falls: Care Instructions  Injuries and health problems such as trouble walking or poor eyesight can increase your risk of falling. So can some medicines. But there are things you can do to help prevent falls. You can exercise to get stronger. You can also arrange your home to make it safer.    Talk to your doctor about the medicines you take. Ask if any of them increase the risk of falls and whether they can be changed or stopped.   Try to exercise regularly. It can help improve your strength and balance. This can help lower your risk of falling.     Practice fall safety and prevention.    Wear low-heeled shoes that fit well and give your feet good support. Talk to your doctor if you have foot problems that make this  "hard.  Carry a cellphone or wear a medical alert device that you can use to call for help.  Use stepladders instead of chairs to reach high objects. Don't climb if you're at risk for falls. Ask for help, if needed.  Wear the correct eyeglasses, if you need them.    Make your home safer.    Remove rugs, cords, clutter, and furniture from walkways.  Keep your house well lit. Use night-lights in hallways and bathrooms.  Install and use sturdy handrails on stairways.  Wear nonskid footwear, even inside. Don't walk barefoot or in socks without shoes.    Be safe outside.    Use handrails, curb cuts, and ramps whenever possible.  Keep your hands free by using a shoulder bag or backpack.  Try to walk in well-lit areas. Watch out for uneven ground, changes in pavement, and debris.  Be careful in the winter. Walk on the grass or gravel when sidewalks are slippery. Use de-icer on steps and walkways. Add non-slip devices to shoes.    Put grab bars and nonskid mats in your shower or tub and near the toilet. Try to use a shower chair or bath bench when bathing.   Get into a tub or shower by putting in your weaker leg first. Get out with your strong side first. Have a phone or medical alert device in the bathroom with you.   Where can you learn more?  Go to https://www.[employer]icabit.net/patiented  Enter G117 in the search box to learn more about \"Preventing Falls: Care Instructions.\"  Current as of: July 17, 2023               Content Version: 14.0    8228-6328 [employer].   Care instructions adapted under license by your healthcare professional. If you have questions about a medical condition or this instruction, always ask your healthcare professional. [employer] disclaims any warranty or liability for your use of this information.      Hearing Loss: Care Instructions  Overview     Hearing loss is a sudden or slow decrease in how well you hear. It can range from slight to profound. Permanent hearing loss " can occur with aging. It also can happen when you are exposed long-term to loud noise. Examples include listening to loud music, riding motorcycles, or being around other loud machines.  Hearing loss can affect your work and home life. It can make you feel lonely or depressed. You may feel that you have lost your independence. But hearing aids and other devices can help you hear better and feel connected to others.  Follow-up care is a key part of your treatment and safety. Be sure to make and go to all appointments, and call your doctor if you are having problems. It's also a good idea to know your test results and keep a list of the medicines you take.  How can you care for yourself at home?  Avoid loud noises whenever possible. This helps keep your hearing from getting worse.  Always wear hearing protection around loud noises.  Wear a hearing aid as directed.  A professional can help you pick a hearing aid that will work best for you.  You can also get hearing aids over the counter for mild to moderate hearing loss.  Have hearing tests as your doctor suggests. They can show whether your hearing has changed. Your hearing aid may need to be adjusted.  Use other devices as needed. These may include:  Telephone amplifiers and hearing aids that can connect to a television, stereo, radio, or microphone.  Devices that use lights or vibrations. These alert you to the doorbell, a ringing telephone, or a baby monitor.  Television closed-captioning. This shows the words at the bottom of the screen. Most new TVs can do this.  TTY (text telephone). This lets you type messages back and forth on the telephone instead of talking or listening. These devices are also called TDD. When messages are typed on the keyboard, they are sent over the phone line to a receiving TTY. The message is shown on a monitor.  Use text messaging, social media, and email if it is hard for you to communicate by telephone.  Try to learn a listening  "technique called speechreading. It is not lipreading. You pay attention to people's gestures, expressions, posture, and tone of voice. These clues can help you understand what a person is saying. Face the person you are talking to, and have them face you. Make sure the lighting is good. You need to see the other person's face clearly.  Think about counseling if you need help to adjust to your hearing loss.  When should you call for help?  Watch closely for changes in your health, and be sure to contact your doctor if:    You think your hearing is getting worse.     You have new symptoms, such as dizziness or nausea.   Where can you learn more?  Go to https://www.[employer].net/patiented  Enter R798 in the search box to learn more about \"Hearing Loss: Care Instructions.\"  Current as of: September 27, 2023               Content Version: 14.0    3815-8260 [employer].   Care instructions adapted under license by your healthcare professional. If you have questions about a medical condition or this instruction, always ask your healthcare professional. [employer] disclaims any warranty or liability for your use of this information.      Learning About Stress  What is stress?     Stress is your body's response to a hard situation. Your body can have a physical, emotional, or mental response. Stress is a fact of life for most people, and it affects everyone differently. What causes stress for you may not be stressful for someone else.  A lot of things can cause stress. You may feel stress when you go on a job interview, take a test, or run a race. This kind of short-term stress is normal and even useful. It can help you if you need to work hard or react quickly. For example, stress can help you finish an important job on time.  Long-term stress is caused by ongoing stressful situations or events. Examples of long-term stress include long-term health problems, ongoing problems at work, or conflicts " in your family. Long-term stress can harm your health.  How does stress affect your health?  When you are stressed, your body responds as though you are in danger. It makes hormones that speed up your heart, make you breathe faster, and give you a burst of energy. This is called the fight-or-flight stress response. If the stress is over quickly, your body goes back to normal and no harm is done.  But if stress happens too often or lasts too long, it can have bad effects. Long-term stress can make you more likely to get sick, and it can make symptoms of some diseases worse. If you tense up when you are stressed, you may develop neck, shoulder, or low back pain. Stress is linked to high blood pressure and heart disease.  Stress also harms your emotional health. It can make you carreon, tense, or depressed. Your relationships may suffer, and you may not do well at work or school.  What can you do to manage stress?  You can try these things to help manage stress:   Do something active. Exercise or activity can help reduce stress. Walking is a great way to get started. Even everyday activities such as housecleaning or yard work can help.  Try yoga or villa chi. These techniques combine exercise and meditation. You may need some training at first to learn them.  Do something you enjoy. For example, listen to music or go to a movie. Practice your hobby or do volunteer work.  Meditate. This can help you relax, because you are not worrying about what happened before or what may happen in the future.  Do guided imagery. Imagine yourself in any setting that helps you feel calm. You can use online videos, books, or a teacher to guide you.  Do breathing exercises. For example:  From a standing position, bend forward from the waist with your knees slightly bent. Let your arms dangle close to the floor.  Breathe in slowly and deeply as you return to a standing position. Roll up slowly and lift your head last.  Hold your breath for  "just a few seconds in the standing position.  Breathe out slowly and bend forward from the waist.  Let your feelings out. Talk, laugh, cry, and express anger when you need to. Talking with supportive friends or family, a counselor, or a terry leader about your feelings is a healthy way to relieve stress. Avoid discussing your feelings with people who make you feel worse.  Write. It may help to write about things that are bothering you. This helps you find out how much stress you feel and what is causing it. When you know this, you can find better ways to cope.  What can you do to prevent stress?  You might try some of these things to help prevent stress:  Manage your time. This helps you find time to do the things you want and need to do.  Get enough sleep. Your body recovers from the stresses of the day while you are sleeping.  Get support. Your family, friends, and community can make a difference in how you experience stress.  Limit your news feed. Avoid or limit time on social media or news that may make you feel stressed.  Do something active. Exercise or activity can help reduce stress. Walking is a great way to get started.  Where can you learn more?  Go to https://www.[employer].net/patiented  Enter N032 in the search box to learn more about \"Learning About Stress.\"  Current as of: October 24, 2023               Content Version: 14.0    5799-3099 [employer].   Care instructions adapted under license by your healthcare professional. If you have questions about a medical condition or this instruction, always ask your healthcare professional. [employer] disclaims any warranty or liability for your use of this information.      Learning About Sleeping Well  What does sleeping well mean?     Sleeping well means getting enough sleep to feel good and stay healthy. How much sleep is enough varies among people.  The number of hours you sleep and how you feel when you wake up are both " important. If you do not feel refreshed, you probably need more sleep. Another sign of not getting enough sleep is feeling tired during the day.  Experts recommend that adults get at least 7 or more hours of sleep per day. Children and older adults need more sleep.  Why is getting enough sleep important?  Getting enough quality sleep is a basic part of good health. When your sleep suffers, your physical health, mood, and your thoughts can suffer too. You may find yourself feeling more grumpy or stressed. Not getting enough sleep also can lead to serious problems, including injury, accidents, anxiety, and depression.  What might cause poor sleeping?  Many things can cause sleep problems, including:  Changes to your sleep schedule.  Stress. Stress can be caused by fear about a single event, such as giving a speech. Or you may have ongoing stress, such as worry about work or school.  Depression, anxiety, and other mental or emotional conditions.  Changes in your sleep habits or surroundings. This includes changes that happen where you sleep, such as noise, light, or sleeping in a different bed. It also includes changes in your sleep pattern, such as having jet lag or working a late shift.  Health problems, such as pain, breathing problems, and restless legs syndrome.  Lack of regular exercise.  Using alcohol, nicotine, or caffeine before bed.  How can you help yourself?  Here are some tips that may help you sleep more soundly and wake up feeling more refreshed.  Your sleeping area   Use your bedroom only for sleeping and sex. A bit of light reading may help you fall asleep. But if it doesn't, do your reading elsewhere in the house. Try not to use your TV, computer, smartphone, or tablet while you are in bed.  Be sure your bed is big enough to stretch out comfortably, especially if you have a sleep partner.  Keep your bedroom quiet, dark, and cool. Use curtains, blinds, or a sleep mask to block out light. To block out  "noise, use earplugs, soothing music, or a \"white noise\" machine.  Your evening and bedtime routine   Create a relaxing bedtime routine. You might want to take a warm shower or bath, or listen to soothing music.  Go to bed at the same time every night. And get up at the same time every morning, even if you feel tired.  What to avoid   Limit caffeine (coffee, tea, caffeinated sodas) during the day, and don't have any for at least 6 hours before bedtime.  Avoid drinking alcohol before bedtime. Alcohol can cause you to wake up more often during the night.  Try not to smoke or use tobacco, especially in the evening. Nicotine can keep you awake.  Limit naps during the day, especially close to bedtime.  Avoid lying in bed awake for too long. If you can't fall asleep or if you wake up in the middle of the night and can't get back to sleep within about 20 minutes, get out of bed and go to another room until you feel sleepy.  Avoid taking medicine right before bed that may keep you awake or make you feel hyper or energized. Your doctor can tell you if your medicine may do this and if you can take it earlier in the day.  If you can't sleep   Imagine yourself in a peaceful, pleasant scene. Focus on the details and feelings of being in a place that is relaxing.  Get up and do a quiet or boring activity until you feel sleepy.  Avoid drinking any liquids before going to bed to help prevent waking up often to use the bathroom.  Where can you learn more?  Go to https://www.[employer].net/patiented  Enter J942 in the search box to learn more about \"Learning About Sleeping Well.\"  Current as of: July 10, 2023               Content Version: 14.0    4159-6736 [employer].   Care instructions adapted under license by your healthcare professional. If you have questions about a medical condition or this instruction, always ask your healthcare professional. [employer] disclaims any warranty or liability for your " use of this information.      Bladder Training: Care Instructions  Your Care Instructions     Bladder training is used to treat urge incontinence and stress incontinence. Urge incontinence means that the need to urinate comes on so fast that you can't get to a toilet in time. Stress incontinence means that you leak urine because of pressure on your bladder. For example, it may happen when you laugh, cough, or lift something heavy.  Bladder training can increase how long you can wait before you have to urinate. It can also help your bladder hold more urine. And it can give you better control over the urge to urinate.  It is important to remember that bladder training takes a few weeks to a few months to make a difference. You may not see results right away, but don't give up.  Follow-up care is a key part of your treatment and safety. Be sure to make and go to all appointments, and call your doctor if you are having problems. It's also a good idea to know your test results and keep a list of the medicines you take.  How can you care for yourself at home?  Work with your doctor to come up with a bladder training program that is right for you. You may use one or more of the following methods.  Delayed urination  In the beginning, try to keep from urinating for 5 minutes after you first feel the need to go.  While you wait, take deep, slow breaths to relax. Kegel exercises can also help you delay the need to go to the bathroom.  After some practice, when you can easily wait 5 minutes to urinate, try to wait 10 minutes before you urinate.  Slowly increase the waiting period until you are able to control when you have to urinate.  Scheduled urination  Empty your bladder when you first wake up in the morning.  Schedule times throughout the day when you will urinate.  Start by going to the bathroom every hour, even if you don't need to go.  Slowly increase the time between trips to the bathroom.  When you have found a  "schedule that works well for you, keep doing it.  If you wake up during the night and have to urinate, do it. Apply your schedule to waking hours only.  Kegel exercises  These tighten and strengthen pelvic muscles, which can help you control the flow of urine. (If doing these exercises causes pain, stop doing them and talk with your doctor.) To do Kegel exercises:  Squeeze your muscles as if you were trying not to pass gas. Or squeeze your muscles as if you were stopping the flow of urine. Your belly, legs, and buttocks shouldn't move.  Hold the squeeze for 3 seconds, then relax for 5 to 10 seconds.  Start with 3 seconds, then add 1 second each week until you are able to squeeze for 10 seconds.  Repeat the exercise 10 times a session. Do 3 to 8 sessions a day.  When should you call for help?  Watch closely for changes in your health, and be sure to contact your doctor if:    Your incontinence is getting worse.     You do not get better as expected.   Where can you learn more?  Go to https://[email or web address].net/patiented  Enter V684 in the search box to learn more about \"Bladder Training: Care Instructions.\"  Current as of: November 15, 2023               Content Version: 14.0    4827-4438 [employer].   Care instructions adapted under license by your healthcare professional. If you have questions about a medical condition or this instruction, always ask your healthcare professional. [employer] disclaims any warranty or liability for your use of this information.      "

## 2024-06-11 LAB
ALBUMIN SERPL BCG-MCNC: 3.9 G/DL (ref 3.5–5.2)
ALP SERPL-CCNC: 91 U/L (ref 40–150)
ALT SERPL W P-5'-P-CCNC: 14 U/L (ref 0–70)
ANION GAP SERPL CALCULATED.3IONS-SCNC: 9 MMOL/L (ref 7–15)
AST SERPL W P-5'-P-CCNC: 24 U/L (ref 0–45)
BILIRUB SERPL-MCNC: 0.2 MG/DL
BUN SERPL-MCNC: 32.2 MG/DL (ref 8–23)
CALCIUM SERPL-MCNC: 9 MG/DL (ref 8.2–9.6)
CHLORIDE SERPL-SCNC: 105 MMOL/L (ref 98–107)
CHOLEST SERPL-MCNC: 140 MG/DL
CREAT SERPL-MCNC: 0.9 MG/DL (ref 0.67–1.17)
DEPRECATED HCO3 PLAS-SCNC: 26 MMOL/L (ref 22–29)
EGFRCR SERPLBLD CKD-EPI 2021: 80 ML/MIN/1.73M2
FASTING STATUS PATIENT QL REPORTED: NO
FASTING STATUS PATIENT QL REPORTED: NO
GLUCOSE SERPL-MCNC: 137 MG/DL (ref 70–99)
HDLC SERPL-MCNC: 37 MG/DL
LDLC SERPL CALC-MCNC: 44 MG/DL
NONHDLC SERPL-MCNC: 103 MG/DL
POTASSIUM SERPL-SCNC: 4.2 MMOL/L (ref 3.4–5.3)
PROT SERPL-MCNC: 6.8 G/DL (ref 6.4–8.3)
SODIUM SERPL-SCNC: 140 MMOL/L (ref 135–145)
TRIGL SERPL-MCNC: 294 MG/DL
TSH SERPL DL<=0.005 MIU/L-ACNC: 1.13 UIU/ML (ref 0.3–4.2)

## 2024-06-11 NOTE — RESULT ENCOUNTER NOTE
Triglycerides high but cholesterol is normal.    The high urea nitrogen indicates dehydration.  Increase fluid intake.    Hemoglobin a1c ( diabetes test ) is fine.    Other labs are good.    Arie Dahl MD

## 2024-06-17 ENCOUNTER — APPOINTMENT (OUTPATIENT)
Dept: OPTOMETRY | Facility: CLINIC | Age: 89
End: 2024-06-17
Payer: COMMERCIAL

## 2024-06-17 PROCEDURE — 92341 FIT SPECTACLES BIFOCAL: CPT | Performed by: OPHTHALMOLOGY

## 2024-08-01 ENCOUNTER — PATIENT OUTREACH (OUTPATIENT)
Dept: GERIATRIC MEDICINE | Facility: CLINIC | Age: 89
End: 2024-08-01
Payer: COMMERCIAL

## 2024-08-01 NOTE — PROGRESS NOTES
[facility] Care Coordination Contact      [facility] Six-Month Telephone Assessment    6 month telephone assessment completed on 8/1/24.    ER visits: No  Hospitalizations: No  TCU stays: No  Significant health status changes: None  Falls/Injuries: No  ADL/IADL changes: No  Changes in services: No    Caregiver Assessment follow up:  NA    Goals: See POC in chart for goal progress documentation.      Will see member in 6 months for an annual health risk assessment.   Encouraged member to call CC with any questions or concerns in the meantime.         Lennie Saeed RN PHN  Care Coordinator  [facility]  O: [phone]  C:[phone]  F:[phone]

## 2024-08-23 ENCOUNTER — NURSE TRIAGE (OUTPATIENT)
Dept: NURSING | Facility: CLINIC | Age: 89
End: 2024-08-23
Payer: COMMERCIAL

## 2024-08-23 NOTE — TELEPHONE ENCOUNTER
Information only. Red flag triage.Daughter GREENFIELD calls for dad pt, blurry vision left eye x 2 days, maybe longer. Doesn't see from rt eye.  Dad in br  now,no further assessment.Daughter will ask about pain, ,redness, drainage when he is out. Because of age and only one functional eye is very worried. Please call daughter, [phone].    NATHAN Marquez tele triage  Eye problems  pg [phone].

## 2024-08-23 NOTE — TELEPHONE ENCOUNTER
Spoke to pt and daughter at 1100    Right eye with intermittent blurring of vision.     Pt still able to read, but becomes more blurred with reading activities as day goes on.    No acute vision loss and pt states vision goes to baseline at times    Has been going on for about a month.    No eye pain  No redness    Encouraged starting OTC lubricating eye drops-- refresh.    Pt sees Dr. Orourke at [facility] and will forward to community team to review scheduling at [facility]/regular eye provider in next week or two.    Pt/daughter seemed satisfied with information/plan.    Note to on call eye provider for review/amendment.    Gerry Knutson RN 11:13 AM 08/23/24

## 2024-08-27 ENCOUNTER — OFFICE VISIT (OUTPATIENT)
Dept: OPHTHALMOLOGY | Facility: CLINIC | Age: 89
End: 2024-08-27
Payer: COMMERCIAL

## 2024-08-27 DIAGNOSIS — H35.3121 EARLY DRY STAGE NONEXUDATIVE AGE-RELATED MACULAR DEGENERATION OF LEFT EYE: Primary | ICD-10-CM

## 2024-08-27 PROCEDURE — 92134 CPTRZ OPH DX IMG PST SGM RTA: CPT | Performed by: OPHTHALMOLOGY

## 2024-08-27 PROCEDURE — 92012 INTRM OPH EXAM EST PATIENT: CPT | Performed by: OPHTHALMOLOGY

## 2024-08-27 ASSESSMENT — REFRACTION_MANIFEST
OD_SPHERE: BALANCE
OS_SPHERE: +0.25
OS_CYLINDER: +1.00
OS_AXIS: 176
OS_ADD: +3.00

## 2024-08-27 ASSESSMENT — TONOMETRY
OS_IOP_MMHG: 11
OD_IOP_MMHG: 23
IOP_METHOD: APPLANATION

## 2024-08-27 ASSESSMENT — REFRACTION_WEARINGRX
SPECS_TYPE: BIFOCAL
OD_SPHERE: BALANCE
OS_CYLINDER: +1.50
OS_SPHERE: -0.25
OS_AXIS: 175
OS_ADD: +3.00

## 2024-08-27 ASSESSMENT — CONF VISUAL FIELD
OS_SUPERIOR_NASAL_RESTRICTION: 0
OD_INFERIOR_TEMPORAL_RESTRICTION: 1
OS_INFERIOR_TEMPORAL_RESTRICTION: 0
OS_INFERIOR_NASAL_RESTRICTION: 0
OS_SUPERIOR_TEMPORAL_RESTRICTION: 0
OD_INFERIOR_NASAL_RESTRICTION: 1
OS_NORMAL: 1
OD_SUPERIOR_NASAL_RESTRICTION: 1
OD_SUPERIOR_TEMPORAL_RESTRICTION: 1

## 2024-08-27 ASSESSMENT — CUP TO DISC RATIO: OS_RATIO: 0.4

## 2024-08-27 ASSESSMENT — VISUAL ACUITY
CORRECTION_TYPE: GLASSES
OS_CC: 20/60
METHOD: SNELLEN - LINEAR

## 2024-08-27 ASSESSMENT — EXTERNAL EXAM - RIGHT EYE: OD_EXAM: NORMAL

## 2024-08-27 ASSESSMENT — EXTERNAL EXAM - LEFT EYE: OS_EXAM: NORMAL

## 2024-08-27 NOTE — PROGRESS NOTES
" Current Eye Medications:    Cosopt (dorzolamide-timolol -- dark blue top) twice daily in the left eye.   Brimonidine (purple top) twice daily in the left eye     Subjective: Here for evaluation of decreased vision. Patient complains of blurry vision in left eye. Started about 1 month ago and seems to be worsening. No eye pain or discomfort.     Denies pain right eye.     Objective:  See Ophthalmology Exam.       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 Their office will will contact you to schedule an appointment for an in home evaluation.     Continue:   Cosopt (dorzolamide-timolol -- dark blue top) twice daily both eyes. About 12 hours apart.   Brimonidine (purple top) twice daily both eyes. About 12 hours apart.     May use artificial tears up to four times a day (like Refresh Optive, Systane Balance, or TheraTears. Avoid \"get the red out\" drops and generic artifical tears).     Continue to take a multiple vitamin or \"eye vitamin\" daily (AREDS2).  Protect your eyes outdoors from ultraviolet rays with sunglasses and/or brimmed hat.  Have spinach (cooked or raw), colorful fruits, walnuts, hazelnuts, almonds in your diet.  Monitor the vision in each eye weekly - call if any sudden persistent changes.    Wait at least 5 minutes between drops if using more than one at a time.     Return visit in April 2024 for a complete exam     Rodney Colorado M.D.  [phone]       "

## 2024-08-27 NOTE — PATIENT INSTRUCTIONS
"Referral sent to the [facility] for an evaluation for vision aids. Their office will will contact you to schedule an appointment for an in home evaluation.     Continue:   Cosopt (dorzolamide-timolol -- dark blue top) twice daily both eyes. About 12 hours apart.   Brimonidine (purple top) twice daily both eyes. About 12 hours apart.     May use artificial tears up to four times a day (like Refresh Optive, Systane Balance, or TheraTears. Avoid \"get the red out\" drops and generic artifical tears).     Continue to take a multiple vitamin or \"eye vitamin\" daily (AREDS2).  Protect your eyes outdoors from ultraviolet rays with sunglasses and/or brimmed hat.  Have spinach (cooked or raw), colorful fruits, walnuts, hazelnuts, almonds in your diet.  Monitor the vision in each eye weekly - call if any sudden persistent changes.    Wait at least 5 minutes between drops if using more than one at a time.     Return visit in April 2024 for a complete exam     Rodney Colorado M.D.  [phone]    "

## 2024-08-27 NOTE — LETTER
"8/27/2024      Leona David  [address]   [place] [address]      Dear Colleague,    Thank you for referring your patient, Leona David, to the [facility] [place]. Please see a copy of my visit note below.     Current Eye Medications:    Cosopt (dorzolamide-timolol -- dark blue top) twice daily in the left eye.   Brimonidine (purple top) twice daily in the left eye     Subjective: Here for evaluation of decreased vision. Patient complains of blurry vision in left eye. Started about 1 month ago and seems to be worsening. No eye pain or discomfort.     Denies pain right eye.     Objective:  See Ophthalmology Exam.       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 Their office will will contact you to schedule an appointment for an in home evaluation.     Continue:   Cosopt (dorzolamide-timolol -- dark blue top) twice daily both eyes. About 12 hours apart.   Brimonidine (purple top) twice daily both eyes. About 12 hours apart.     May use artificial tears up to four times a day (like Refresh Optive, Systane Balance, or TheraTears. Avoid \"get the red out\" drops and generic artifical tears).     Continue to take a multiple vitamin or \"eye vitamin\" daily (AREDS2).  Protect your eyes outdoors from ultraviolet rays with sunglasses and/or brimmed hat.  Have spinach (cooked or raw), colorful fruits, walnuts, hazelnuts, almonds in your diet.  Monitor the vision in each eye weekly - call if any sudden persistent changes.    Wait at least 5 minutes between drops if using more than one at a time.     Return visit in April 2024 for a complete exam     Rodney Colorado M.D.  [phone]         Again, thank you for allowing me to participate in the care of your patient.        Sincerely,        Rodney Colorado MD  "

## 2024-08-30 ENCOUNTER — MEDICAL CORRESPONDENCE (OUTPATIENT)
Dept: HEALTH INFORMATION MANAGEMENT | Facility: CLINIC | Age: 89
End: 2024-08-30
Payer: COMMERCIAL

## 2024-10-03 ENCOUNTER — TELEPHONE (OUTPATIENT)
Dept: FAMILY MEDICINE | Facility: CLINIC | Age: 89
End: 2024-10-03

## 2024-10-03 NOTE — TELEPHONE ENCOUNTER
Called with  and daughter answered phone, declined , father with her ok to talk to.  Patient is extremely fatigued, not eating, fever.    Stated VV inappropriate and would recommend UC. Reviewed close UC or Ed and call dropped.    Called daughter back and she will take Father to ED, due to increasing symptoms. Reviewd if lethargic, SOB, CP to call 911. Daughter verbalized understanding and all questions answered.     Shanice Regalado RN  [facility] - Registered Nurse  Clinic Triage [place]   October 3, 2024

## 2024-10-04 ENCOUNTER — PATIENT OUTREACH (OUTPATIENT)
Dept: GERIATRIC MEDICINE | Facility: CLINIC | Age: 89
End: 2024-10-04
Payer: COMMERCIAL

## 2024-10-04 NOTE — Clinical Note
You are receiving this message because this member is on the state government program [facility] [facility] or [facility] [facility].  are required to notify the primary care physician with all ED visits, admissions and discharges from hospitals and nursing homes. If you have further questions please feel free to contact me.  Lennie Saeed RN N [facility] Coordinator [phone]

## 2024-10-04 NOTE — Clinical Note
You are receiving this message because this member is on the state government program [facility] [facility] or [facility] [facility].  are required to notify the primary care physician with all ED visits, admissions and discharges from hospitals and nursing homes. If you have further questions please feel free to contact me.  Lennie Saeed RN N [facility] Coordinator [phone]

## 2024-10-05 ENCOUNTER — NURSE TRIAGE (OUTPATIENT)
Dept: NURSING | Facility: CLINIC | Age: 89
End: 2024-10-05
Payer: COMMERCIAL

## 2024-10-05 NOTE — TELEPHONE ENCOUNTER
Nurse Triage [facility]    Is this a 2nd Level Triage? NO    Situation: Fall    Background: Patient fell out of chair this afternoon and hit his head. Daughter was not present for the fall but her son said he heard a loud noise and came out to see the patient laying on the floor holding his head and his chair was tipped over. Was just in ED last night and diagnosed with pyelonephritis.     Assessment:  Unsure of the action mechanism of injury. Patient is currently complaining of head ache, neck pain and back pain. Daughter states patient seems more confused today as well and his speech sounds more hoarse but could be from his recent stay in the ED.     Protocol Recommended Disposition:   Call  Now, See PCP Within 3 Days    Recommendation: Call  now due to not knowing how accident/fall occurred and patient is complaining of neck and head pain with confusion and speech changes.          Reason for Disposition   MILD weakness (i.e., does not interfere with ability to work, go to school, normal activities)  (Exception: Mild weakness is a chronic symptom.)   [1] Dangerous mechanism of injury (e.g., MVA, diving, trampoline, contact sports, fall > 10 feet or 3 meters) AND [2] NECK pain AND [3] began < 1 hour after injury   [1] ACUTE NEURO SYMPTOM AND [2] present now  (DEFINITION: difficult to awaken OR confused thinking and talking OR slurred speech OR weakness of arms OR unsteady walking)    Additional Information   Negative: Major injury from dangerous force (e.g., fall > 10 feet or 3 meters)   Negative: [1] Major bleeding (e.g., actively dripping or spurting) AND [2] can't be stopped   Negative: Shock suspected (e.g., cold/pale/clammy skin, too weak to stand)   Negative: Difficult to awaken or acting confused (e.g., disoriented, slurred speech)   Negative: [1] SEVERE weakness (i.e., unable to walk or barely able to walk, requires support) AND [2] new-onset or worsening   Negative: [1] Can't stand (bear weight)  or walk AND [2] new-onset after fall   Negative: Sounds like a life-threatening emergency to the triager   Negative: Injury (or injuries) that need emergency care   Negative: Sounds like a serious injury to the triager   Negative: [1] Muscle pain AND [2] dark (cola colored) or red-colored urine   Negative: [1] Unable to get up until help (e.g., caregiver, family, friend) arrived AND [2] on the ground 1 hour or more   Negative: Patient sounds very sick or weak to the triager   Negative: [1] MODERATE weakness (i.e., interferes with work, school, normal activities) AND [2] new-onset or worsening   Negative: [1] Fever > 101 F (38.3 C) AND [2] age > 60 years   Negative: [1] Fever > 100.0 F (37.8 C) AND [2] bedridden (e.g., CVA, chronic illness, recovering from surgery)   Negative: [1] Fever > 100.0 F (37.8 C) AND [2] diabetes mellitus or weak immune system (e.g., HIV positive, cancer chemo, splenectomy, organ transplant, chronic steroids)   Negative: Suspicious history for the fall   Negative: [1] Caller has URGENT question AND [2] triager unable to answer question   Negative: [1] No prior tetanus shots (or is not fully vaccinated) AND [2] any wound (e.g., cut or scrape)   Negative: [1] HIV positive or severe immunodeficiency (severely weak immune system) AND [2] DIRTY cut or scrape   Negative: [1] Caller has NON-URGENT question AND [2] triager unable to answer question   Negative: Knocked out (unconscious) > 1 minute   Negative: Seizure (convulsion) occurred  (Exception: Prior history of seizures and now alert and without Acute Neuro Symptoms.)   Negative: Penetrating head injury (e.g., knife, gun shot wound, metal object)   Negative: [1] Major bleeding (e.g., actively dripping or spurting) AND [2] can't be stopped    Protocols used: Falls and Dloqldf-X-WJ, Head Injury-A-AH

## 2024-10-23 ENCOUNTER — IMMUNIZATION (OUTPATIENT)
Dept: FAMILY MEDICINE | Facility: CLINIC | Age: 89
End: 2024-10-23
Payer: COMMERCIAL

## 2024-10-23 PROCEDURE — 91320 SARSCV2 VAC 30MCG TRS-SUC IM: CPT

## 2024-10-23 PROCEDURE — 90480 ADMN SARSCOV2 VAC 1/ONLY CMP: CPT

## 2024-11-05 ENCOUNTER — OFFICE VISIT (OUTPATIENT)
Dept: OPHTHALMOLOGY | Facility: CLINIC | Age: 89
End: 2024-11-05
Payer: COMMERCIAL

## 2024-11-05 DIAGNOSIS — H10.31 ACUTE CONJUNCTIVITIS OF RIGHT EYE, UNSPECIFIED ACUTE CONJUNCTIVITIS TYPE: Primary | ICD-10-CM

## 2024-11-05 PROCEDURE — 92012 INTRM OPH EXAM EST PATIENT: CPT | Performed by: OPHTHALMOLOGY

## 2024-11-05 RX ORDER — NEOMYCIN SULFATE, POLYMYXIN B SULFATE, AND DEXAMETHASONE 3.5; 10000; 1 MG/G; [USP'U]/G; MG/G
OINTMENT OPHTHALMIC
Qty: 3.5 G | Refills: 3 | Status: SHIPPED | OUTPATIENT
Start: 2024-11-05

## 2024-11-05 ASSESSMENT — VISUAL ACUITY
METHOD: SNELLEN - LINEAR
OS_CC: 20/50
OD_SC: LP

## 2024-11-05 ASSESSMENT — EXTERNAL EXAM - LEFT EYE: OS_EXAM: NORMAL

## 2024-11-05 ASSESSMENT — EXTERNAL EXAM - RIGHT EYE: OD_EXAM: NORMAL

## 2024-11-05 ASSESSMENT — TONOMETRY
OS_IOP_MMHG: 6
IOP_METHOD: ICARE
OD_IOP_MMHG: 16

## 2024-11-05 NOTE — PROGRESS NOTES
Current Eye Medications:    Cosopt (dorzolamide-timolol -- dark blue top) twice daily left eye.    Brimonidine (purple top) twice daily left eye.  Artifical tears in the right eye twice daily.       Subjective: Here for evaluation of right eye problem. Daughter states that right eye is swollen, red, and itchy. Started 4 days ago. Discharge and crust noticed yesterday around right eye. No eye pain. Vision seems stable.     Here with daughter.      Objective:  See Ophthalmology Exam.       Assessment:  Conjunctivitis/blepharitis right eye.      Plan:  Continue:  Cosopt (dorzolamide-timolol -- dark blue top) twice daily left eye.    Brimonidine (purple top) twice daily left eye.    Begin:  Luisito/Dex ointment - apply to the outside and inside of the right eye three times daily     Call in 1-2 weeks to update on symptoms     Rodney Colorado M.D.  [phone]

## 2024-11-05 NOTE — LETTER
11/5/2024      Leona David  [address]   [place] [address]      Dear Colleague,    [facility] Please see a copy of my visit note below.     Current Eye Medications:    Cosopt (dorzolamide-timolol -- dark blue top) twice daily left eye.    Brimonidine (purple top) twice daily left eye.  Artifical tears in the right eye twice daily.       Subjective: Here for evaluation of right eye problem. Daughter states that right eye is swollen, red, and itchy. Started 4 days ago. Discharge and crust noticed yesterday around right eye. No eye pain. Vision seems stable.     Here with daughter.      Objective:  See Ophthalmology Exam.       Assessment:  Conjunctivitis/blepharitis right eye.      Plan:  Continue:  Cosopt (dorzolamide-timolol -- dark blue top) twice daily left eye.    Brimonidine (purple top) twice daily left eye.    Begin:  Luisito/Dex ointment - apply to the outside and inside of the right eye three times daily     Call in 1-2 weeks to update on symptoms     Rodney Colorado M.D.  [phone]         Again, thank you for allowing me to participate in the care of your patient.        Sincerely,        Rodney Colorado MD

## 2024-11-05 NOTE — PATIENT INSTRUCTIONS
Continue:  Cosopt (dorzolamide-timolol -- dark blue top) twice daily left eye.    Brimonidine (purple top) twice daily left eye.    Begin:  Luisito/Dex ointment - apply to the outside and inside of the right eye three times daily     Call in 1-2 weeks to update on symptoms     Rodney Colorado M.D.  [phone]

## 2024-11-15 ENCOUNTER — TELEPHONE (OUTPATIENT)
Dept: OPHTHALMOLOGY | Facility: CLINIC | Age: 89
End: 2024-11-15
Payer: COMMERCIAL

## 2024-11-15 NOTE — TELEPHONE ENCOUNTER
[facility] Call Center    Phone Message    May a detailed message be left on voicemail: yes     Reason for Call: Medication Question or concern regarding medication   Prescription Clarification  Name of Medication: neomycin-polymyxin-dexAMETHasone (MAXITROL) [id number] ophthalmic ointment   Prescribing Provider: Dr Colorado   Pharmacy: N/A   What on the order needs clarification? Daughter Aburto calling that Dr Colorado prescribed ointment and patients symptoms is gone. Patient is insisting he don't need to use it anymore but daughter would like to check with Dr Colorado to see if patient should continue to use until out or can stop if symptoms are gone.     Please call Aburto back at [phone]. Thank you.    Action Taken: Other: [facility]    Travel Screening: Not Applicable

## 2024-11-15 NOTE — TELEPHONE ENCOUNTER
Per Dr. Stanislav camarillo to stop maxitrol nadine. I called daughter, Lamonte and notified her.

## 2024-11-26 ENCOUNTER — NURSE TRIAGE (OUTPATIENT)
Dept: FAMILY MEDICINE | Facility: CLINIC | Age: 89
End: 2024-11-26
Payer: COMMERCIAL

## 2024-11-26 NOTE — TELEPHONE ENCOUNTER
"Daughter, Lamonte, calling, we have consent to communicate on file.    Says patient complaining of severe pain with urination last evening and was unable to void much.   Recently treated for bladder infection.    I see he was in [facility] for pyelonephritis 10/3/24.    See triage below, limited by patient's dementia and that he is currently angry with Aburto about something and does not want to talk.    GO TO OFFICE OR VIDEO VISIT NOW:   * You need to be examined or have a video telemedicine visit.  * PCP OFFICE VISIT: Come into the office right now.  Nothing available in person or virtual at [facility], [facility], [facility], [facility], or [facility].   Is already scheduled for tomorrow AM in person but Aburto says her sister in [place] feels like he needs to be seen today.  * IF NO AVAILABLE OFFICE OR VIDEO APPOINTMENTS: You need to be seen in an Urgent Care Center. Go to the one at nearby.  She thinks she will try to go to [facility] on [address].   She is not sure patient will go with her as he is angry with her/dementia issues. Leave now. A nearby Urgent Care Center is often a good source of care. Another choice is to go to the Emergency Department.    Aburto verbalized understanding of and agreement with plan.    Ivette DIXON RN  [facility] Triage      Reason for Disposition   SEVERE pain with urination    Additional Information   Negative: Shock suspected (e.g., cold/pale/clammy skin, too weak to stand, low BP, rapid pulse)   Negative: Sounds like a life-threatening emergency to the triager   Negative: Unable to urinate (or only a few drops) and bladder feels very full   Negative: Swollen scrotum   Negative: Pain in scrotum or testicle that persists > 1 hour   Negative: Fever > 100.4 F (38.0 C)   Negative: Vomiting   Negative: Patient sounds very sick or weak to the triager    Answer Assessment - Initial Assessment Questions  1. SEVERITY: \"How bad is the pain?\"  (e.g., Scale 1-10; mild, moderate, or severe)    - MILD (1-3): " "Complains slightly about urination hurting.    - MODERATE (4-7): Interferes with normal activities.      - SEVERE (8-10): Excruciating, unwilling or unable to urinate because of the pain.       Moderate to severe last evening  2. FREQUENCY: \"How many times have you had painful urination today?\"       Daughter says patient has urinated this AM, he is angry at her and not talking to her at present  3. PATTERN: \"Is pain present every time you urinate or just sometimes?\"       unsure  4. ONSET: \"When did the painful urination start?\"       At least last evening  5. FEVER: \"Do you have a fever?\" If Yes, ask: \"What is your temperature, how was it measured, and when did it start?\"      No fever  6. PAST UTI: \"Have you had a urine infection before?\" If Yes, ask: \"When was the last time?\" and \"What happened that time?\"       Hospitalized 10/3/24 for pyelonephritis  7. CAUSE: \"What do you think is causing the painful urination?\"       Possible recurrence of bladder infection  8. OTHER SYMPTOMS: \"Do you have any other symptoms?\" (e.g., flank pain, penis discharge, scrotal pain, blood in urine)      Unknown, patient did not complain of that last evening, today is not wanting to talk to daughter, has dementia    Protocols used: Urination Pain - Male-A-OH    "

## 2024-11-27 ENCOUNTER — OFFICE VISIT (OUTPATIENT)
Dept: INTERNAL MEDICINE | Facility: CLINIC | Age: 89
End: 2024-11-27
Payer: COMMERCIAL

## 2024-11-27 VITALS
BODY MASS INDEX: 23.83 KG/M2 | TEMPERATURE: 97.5 F | HEART RATE: 56 BPM | RESPIRATION RATE: 16 BRPM | DIASTOLIC BLOOD PRESSURE: 63 MMHG | WEIGHT: 122 LBS | SYSTOLIC BLOOD PRESSURE: 118 MMHG | OXYGEN SATURATION: 99 %

## 2024-11-27 DIAGNOSIS — N39.0 ACUTE UTI (URINARY TRACT INFECTION): ICD-10-CM

## 2024-11-27 DIAGNOSIS — I10 HYPERTENSION GOAL BP (BLOOD PRESSURE) < 140/90: ICD-10-CM

## 2024-11-27 DIAGNOSIS — R30.9 PAINFUL URINATION: Primary | ICD-10-CM

## 2024-11-27 DIAGNOSIS — G31.83 LEWY BODY DEMENTIA WITH BEHAVIORAL DISTURBANCE (H): ICD-10-CM

## 2024-11-27 DIAGNOSIS — F02.818 LEWY BODY DEMENTIA WITH BEHAVIORAL DISTURBANCE (H): ICD-10-CM

## 2024-11-27 DIAGNOSIS — E11.9 TYPE 2 DIABETES MELLITUS WITHOUT COMPLICATION, WITHOUT LONG-TERM CURRENT USE OF INSULIN (H): ICD-10-CM

## 2024-11-27 DIAGNOSIS — E78.5 HYPERLIPIDEMIA WITH TARGET LDL LESS THAN 130: ICD-10-CM

## 2024-11-27 LAB
ALBUMIN UR-MCNC: 100 MG/DL
APPEARANCE UR: ABNORMAL
BACTERIA #/AREA URNS HPF: ABNORMAL /HPF
BILIRUB UR QL STRIP: NEGATIVE
COLOR UR AUTO: YELLOW
GLUCOSE UR STRIP-MCNC: NEGATIVE MG/DL
HGB UR QL STRIP: ABNORMAL
KETONES UR STRIP-MCNC: ABNORMAL MG/DL
LEUKOCYTE ESTERASE UR QL STRIP: ABNORMAL
NITRATE UR QL: NEGATIVE
PH UR STRIP: 7.5 [PH] (ref 5–7)
RBC #/AREA URNS AUTO: ABNORMAL /HPF
SP GR UR STRIP: 1.02 (ref 1–1.03)
SQUAMOUS #/AREA URNS AUTO: ABNORMAL /LPF
UROBILINOGEN UR STRIP-ACNC: 0.2 E.U./DL
WBC #/AREA URNS AUTO: >100 /HPF

## 2024-11-27 PROCEDURE — 99214 OFFICE O/P EST MOD 30 MIN: CPT

## 2024-11-27 PROCEDURE — 81001 URINALYSIS AUTO W/SCOPE: CPT

## 2024-11-27 RX ORDER — SULFAMETHOXAZOLE AND TRIMETHOPRIM 800; 160 MG/1; MG/1
1 TABLET ORAL 2 TIMES DAILY
Qty: 6 TABLET | Refills: 0 | Status: SHIPPED | OUTPATIENT
Start: 2024-11-27 | End: 2024-11-30

## 2024-11-27 NOTE — PATIENT INSTRUCTIONS
Urinary Tract Infections (UTI) in Men: Care Instructions  Overview     A urinary tract infection, or UTI, is a term for an infection anywhere between the kidneys and the urethra. (The urethra is the tube that carries urine from the bladder to outside the body.) Most UTIs are bladder infections. They often cause pain or burning when you urinate.  UTIs are caused by bacteria. This means they can be cured with antibiotics. Be sure to complete your treatment so that the infection does not get worse.  Follow-up care is a key part of your treatment and safety. Be sure to make and go to all appointments, and call your doctor if you are having problems. It's also a good idea to know your test results and keep a list of the medicines you take.  How can you care for yourself at home?  Take your antibiotics as prescribed. Do not stop taking them just because you feel better. You need to take the full course of antibiotics.  Take your medicines exactly as prescribed. Your doctor may have prescribed a medicine, such as phenazopyridine (Pyridium), to help relieve pain when you urinate. This turns your urine orange. You may stop taking it when your symptoms get better. But be sure to take all of your antibiotics, which treat the infection.  Drink extra water for the next day or two. This will help make the urine less concentrated and help wash out the bacteria causing the infection. (If you have kidney, heart, or liver disease and have to limit your fluids, talk with your doctor before you increase your fluid intake.)  Avoid drinks that are carbonated or have caffeine. They can irritate the bladder.  Urinate often. Try to empty your bladder each time.  To relieve pain, take a hot bath or lay a heating pad (set on low) over your lower belly or genital area. Never go to sleep with a heating pad in place.  To help prevent UTIs  Drink plenty of fluids. If you have kidney, heart, or liver disease and have to limit fluids, talk with  your doctor before you increase the amount of fluids you drink.  Urinate when you have the urge. Do not hold your urine for a long time. Urinate before you go to sleep.  Keep your penis clean.  Catheter care  If you have a drainage tube (catheter) in place, the following steps will help you care for it.  Always wash your hands before and after touching your catheter.  Check the area around the urethra for inflammation or signs of infection. Signs of infection include irritated, swollen, red, or tender skin, or pus around the catheter.  Clean the area around the catheter with soap and water two times a day. Dry with a clean towel afterward.  Do not apply powder or lotion to the skin around the catheter.  To empty the urine collection bag   Wash your hands with soap and water.  Without touching the drain spout, remove the spout from its sleeve at the bottom of the collection bag. Open the valve on the spout.  Let the urine flow out of the bag and into the toilet or a container. Do not let the tubing or drain spout touch anything.  After you empty the bag, clean the end of the drain spout with tissue and water. Close the valve and put the drain spout back into its sleeve at the bottom of the collection bag.  Wash your hands with soap and water.  When should you call for help?   Call your doctor now or seek immediate medical care if:    Symptoms such as a fever, chills, nausea, or vomiting get worse or happen for the first time.     You have new pain in your back just below your rib cage. This is called flank pain.     There is new blood or pus in your urine.     You are not able to take or keep down your antibiotics.   Watch closely for changes in your health, and be sure to contact your doctor if:    You are not getting better after taking an antibiotic for 2 days.     Your symptoms go away but then come back.   Where can you learn more?  Go to https://www.healthwise.net/patiented  Enter S351 in the search box to  "learn more about \"Urinary Tract Infections (UTI) in Men: Care Instructions.\"  Current as of: November 15, 2023  Content Version: 14.2 2024 [facility] [employer], [facility].   Care instructions adapted under license by your healthcare professional. If you have questions about a medical condition or this instruction, always ask your healthcare professional. [employer] disclaims any warranty or liability for your use of this information.    "

## 2024-11-27 NOTE — PROGRESS NOTES
Assessment & Plan     (R30.9) Painful urination  (primary encounter diagnosis)  Comment: Discussed checking for UTI due to patient symptoms. Patient positive for UTI.   Plan: UA with Microscopic reflex to Culture - Clinic         Collect, REVIEW OF HEALTH MAINTENANCE PROTOCOL         ORDERS, UA Microscopic with Reflex to Culture,         Urine Culture        Positive.   (N39.0) Acute UTI (urinary tract infection)  Comment: Positive for UTI discussed treating due to symptoms, will update if need to change antibiotic.   Plan: sulfamethoxazole-trimethoprim (BACTRIM DS)         800-160 MG tablet        Prescription sent to pharmacy     (E11.9) Type 2 diabetes mellitus without complication, without long-term current use of insulin (H)  Comment: Chronic   Plan: No change to current plan of care.     (G31.83,  F02.818) Lewy body dementia with behavioral disturbance (H)  Comment: Chronic.   Plan: No change to current plan of care.     (I10) Hypertension goal BP (blood pressure) < 140/90  Comment: Chronic, stable. At goal.   Plan: No change to current plan of care.     (E78.5) Hyperlipidemia with target LDL less than 130  Comment: Chronic, stable. At goal.   Plan: No change to current plan of care.             BMI  Estimated body mass index is 23.83 kg/m  as calculated from the following:    Height as of 6/10/24: 1.524 m (5').    Weight as of this encounter: 55.3 kg (122 lb).             Eboni Delgadillo is a 89 year old, presenting for the following health issues:  UTI    History of Present Illness       Reason for visit:  Bladder infection  Symptom onset:  1-3 days ago  Symptoms include:  Burning or hurt  Symptom intensity:  Severe  Symptom progression:  Improving  Had these symptoms before:  Yes  Has tried/received treatment for these symptoms:  Yes  Previous treatment was successful:  Yes  Prior treatment description:  Antibiotics  What makes it worse:  When i tried to urinate  What makes it better:  Nothing   He is  taking medications regularly.                 Review of Systems  Constitutional, HEENT, cardiovascular, pulmonary, gi and gu systems are negative, except as otherwise noted.      Objective    /63 (BP Location: Left arm, Patient Position: Sitting, Cuff Size: Adult Regular)   Pulse 56   Temp 97.5  F (36.4  C) (Temporal)   Resp 16   Wt 55.3 kg (122 lb)   SpO2 99%   BMI 23.83 kg/m    Body mass index is 23.83 kg/m .  Physical Exam   GENERAL: alert and no distress  NECK: no adenopathy, no asymmetry, masses, or scars  RESP: lungs clear to auscultation - no rales, rhonchi or wheezes  CV: regular rate and rhythm, normal S1 S2, no S3 or S4, no murmur, click or rub, no peripheral edema  ABDOMEN: soft, nontender, no hepatosplenomegaly, no masses and bowel sounds normal  MS: no gross musculoskeletal defects noted, no edema    Results for orders placed or performed in visit on 11/27/24   UA with Microscopic reflex to Culture - Clinic Collect     Status: Abnormal    Specimen: Urine, Midstream   Result Value Ref Range    Color Urine Yellow Colorless, Straw, Light Yellow, Yellow    Appearance Urine Cloudy (A) Clear    Glucose Urine Negative Negative mg/dL    Bilirubin Urine Negative Negative    Ketones Urine Trace (A) Negative mg/dL    Specific Gravity Urine 1.020 1.003 - 1.035    Blood Urine Moderate (A) Negative    pH Urine 7.5 (H) 5.0 - 7.0    Protein Albumin Urine 100 (A) Negative mg/dL    Urobilinogen Urine 0.2 0.2, 1.0 E.U./dL    Nitrite Urine Negative Negative    Leukocyte Esterase Urine Large (A) Negative   UA Microscopic with Reflex to Culture     Status: Abnormal   Result Value Ref Range    Bacteria Urine Many (A) None Seen /HPF    RBC Urine 10-25 (A) 0-2 /HPF /HPF    WBC Urine >100 (A) 0-5 /HPF /HPF    Squamous Epithelials Urine Few (A) None Seen /LPF           Signed Electronically by: RICHARDSON Trevino CNP

## 2024-11-28 LAB — BACTERIA UR CULT: ABNORMAL

## 2024-11-30 LAB — BACTERIA UR CULT: ABNORMAL

## 2025-02-10 ENCOUNTER — NURSE TRIAGE (OUTPATIENT)
Dept: FAMILY MEDICINE | Facility: CLINIC | Age: OVER 89
End: 2025-02-10
Payer: COMMERCIAL

## 2025-02-10 NOTE — TELEPHONE ENCOUNTER
"Ha, daughter, calling back (consent on file)     Does not have chest pain - has mild back pain. He had 2 stools today. Stools are solid. Unknown if there was blood in stools. His stomach does not hurt \"much\" anymore, but endorses some pain residing yet. His pain was constant.     She was unable to answer further questions because she does not know and her father was in the bathroom.     RN did say that if he has ANY abdominal pain. She became defensive and stated \"they will not take him back\" RN stated that there should be a triage nurse that evaluates him shortly after walking in. She continued to speak over this RN. RN encouraged her to seek care at another ER if she was uncomfortable with a specific one ER. She verbalized understanding on recommendations.     Laurita Hendrix RN on 2/10/2025 at 1:49 PM        Reason for Disposition   MILD TO MODERATE constant pain lasting > 2 hours    Protocols used: Abdominal Pain - Male-A-[place]    "

## 2025-02-10 NOTE — TELEPHONE ENCOUNTER
"Patient's daughter, Lamonte, calling - consent on file    She said around 11:15, he dad was in the bathroom stating he has severe abdominal pain and \"felt like he was going to die\"    Lamonte was unable to answer majority of the triage questions and she said he is sleeping right now, so she will have to ask him when he wakes up.     She at first said he went to the bathroom 3-4 times emptying out his stool, but then switched the story to him being constipated. RN asked for clarification and she said she will have to ask the patient when he wakes up what he meant.     RN asked if he had vomiting, blood in stool, diarrhea, fever. She did not know and she will have to ask him when he wakes up. RN said if his pain is severe, he needs to go to ED. Aburto said she does not want to take a 89 year old to ED and she is wondering if PCP can \"just prescribe a med\". RN said that he needs to go to ED if having severe pain or blood in his stool especially at his age.     She said she will ask him more of the questions when he wakes up and then call us back. Patient was argumentative with nurse and kept saying she just wants provider to order him a med. RN explained why an assessment was needed and the importance of going to an ED if he is having severe symptoms    Reason for Disposition   SEVERE abdominal pain (e.g., excruciating)    Additional Information   Negative: Passed out (e.g., fainted, lost consciousness, blacked out and was not responding)   Negative: Shock suspected (e.g., cold/pale/clammy skin, too weak to stand, low BP, rapid pulse)   Negative: Sounds like a life-threatening emergency to the triager   Negative: Followed an abdomen (stomach) injury   Negative: Chest pain   Negative: Pain is mainly in upper abdomen (if needed ask: 'is it mainly above the belly button?')   Negative: Abdomen bloating or swelling are main symptoms    Answer Assessment - Initial Assessment Questions  1. LOCATION: \"Where does it hurt?\"       Middle right " "side abdominal pain  2. RADIATION: \"Does the pain shoot anywhere else?\" (e.g., chest, back)      *No Answer*  3. ONSET: \"When did the pain begin?\" (Minutes, hours or days ago)       11:15 am, he was saying his abdominal was so bad \"I am going to die\"  4. SUDDEN: \"Gradual or sudden onset?\"      *No Answer*  5. PATTERN \"Does the pain come and go, or is it constant?\"      Constant   6. SEVERITY: \"How bad is the pain?\"  (e.g., Scale 1-10; mild, moderate, or severe)      Severe pain. He said \"pain is so bad that he feels like he is going to die\"  7. RECURRENT SYMPTOM: \"Have you ever had this type of stomach pain before?\" If Yes, ask: \"When was the last time?\" and \"What happened that time?\"       *No Answer*  8. CAUSE: \"What do you think is causing the stomach pain?\"      *No Answer*  9. RELIEVING/AGGRAVATING FACTORS: \"What makes it better or worse?\" (e.g., antacids, bending or twisting motion, bowel movement)      *No Answer*  10. OTHER SYMPTOMS: \"Do you have any other symptoms?\" (e.g., back pain, diarrhea, fever, urination pain, vomiting)        Was going to the bathroom (bowel movements frequently)    Protocols used: Abdominal Pain - Male-A-[place]    MICHELE Smith  [facility]    "

## 2025-02-11 NOTE — TELEPHONE ENCOUNTER
Routing to Dr. Dahl    Please see RN triage notes below. Patient declining ED and requesting a prescription. Patient has not been seen in ER yet per Care Everywhere. Please advise if you would like patient to be seen in clinic or if he should be directed again to ED.     Hoa LUCIO RN  [facility]

## 2025-02-11 NOTE — TELEPHONE ENCOUNTER
Thu is doing a lot better today. Pain was due to constipation per Ha. They are going to monitor patient from home. They are utilizing patient prn stool softeners. Aburto declined making an appointment for Thu. Aburto will call this nurse line if they need further assistance.    Maddie De La Rosa RN on 2/11/2025 at 9:53 AM

## 2025-02-17 DIAGNOSIS — H40.1131 PRIMARY OPEN ANGLE GLAUCOMA (POAG) OF BOTH EYES, MILD STAGE: ICD-10-CM

## 2025-02-17 RX ORDER — BRIMONIDINE TARTRATE 1.5 MG/ML
1 SOLUTION/ DROPS OPHTHALMIC 2 TIMES DAILY
Qty: 10 ML | Refills: 1 | Status: SHIPPED | OUTPATIENT
Start: 2025-02-17

## 2025-02-17 NOTE — TELEPHONE ENCOUNTER
Patient is due for an appointment in April of 2025.  No appointments scheduled.   Appointment reminder added to refill approval.

## 2025-05-06 ENCOUNTER — TELEPHONE (OUTPATIENT)
Dept: FAMILY MEDICINE | Facility: CLINIC | Age: OVER 89
End: 2025-05-06
Payer: COMMERCIAL

## 2025-05-06 DIAGNOSIS — H91.93 BILATERAL HEARING LOSS, UNSPECIFIED HEARING LOSS TYPE: Primary | ICD-10-CM

## 2025-05-06 NOTE — TELEPHONE ENCOUNTER
I did audiology referral    Patient needs formal evaluation    Please inform patient/family and give then number listed in referral    Thanks    Arie Dahl MD

## 2025-05-06 NOTE — TELEPHONE ENCOUNTER
Order/Referral Request    Who is requesting: Pt's daughter requesting on his behalf    Orders being requested: Hearing aids    Reason service is needed/diagnosis: Hearing problems    When are orders needed by: Asap    Has this been discussed with Provider: No    Does patient have a preference on a Group/Provider/Facility? [facility]    Does patient have an appointment scheduled?: No    Where to send orders: Place orders within Epic    Okay to leave a detailed message?: Yes at Home number on file [phone] ([place])

## 2025-05-07 ENCOUNTER — PATIENT OUTREACH (OUTPATIENT)
Dept: CARE COORDINATION | Facility: CLINIC | Age: OVER 89
End: 2025-05-07
Payer: COMMERCIAL

## 2025-05-12 ENCOUNTER — PATIENT OUTREACH (OUTPATIENT)
Dept: CARE COORDINATION | Facility: CLINIC | Age: OVER 89
End: 2025-05-12
Payer: COMMERCIAL

## 2025-05-26 ENCOUNTER — PATIENT OUTREACH (OUTPATIENT)
Dept: CARE COORDINATION | Facility: CLINIC | Age: OVER 89
End: 2025-05-26
Payer: COMMERCIAL

## 2025-05-30 ENCOUNTER — PATIENT OUTREACH (OUTPATIENT)
Dept: GERIATRIC MEDICINE | Facility: CLINIC | Age: OVER 89
End: 2025-05-30
Payer: COMMERCIAL

## 2025-06-03 NOTE — PROGRESS NOTES
[facility] Care Coordination Contact      [facility] Six-Month Telephone Assessment    6 month telephone assessment completed on 5/30/25 with Aburto.    ER visits: No  Hospitalizations: No  TCU stays: No  Significant health status changes: None  Falls/Injuries: No  ADL/IADL changes: No  Changes in services: No    Caregiver Assessment follow up:  Offered [facility] caregiver assurance program. Declined at this time.     Goals: See Support Plan for goal progress documentation.      Will see member in 6 months for an annual health risk assessment.   Encouraged member to call CC with any questions or concerns in the meantime.       Lennie Saeed RN PHN  Care Coordinator  [facility]  O: [phone]  C:[phone]  F:[phone]

## 2025-06-24 DIAGNOSIS — E78.5 HYPERLIPIDEMIA WITH TARGET LDL LESS THAN 130: ICD-10-CM

## 2025-06-24 DIAGNOSIS — N40.1 BENIGN PROSTATIC HYPERPLASIA WITH LOWER URINARY TRACT SYMPTOMS, SYMPTOM DETAILS UNSPECIFIED: ICD-10-CM

## 2025-06-25 RX ORDER — TAMSULOSIN HYDROCHLORIDE 0.4 MG/1
0.4 CAPSULE ORAL DAILY
Qty: 90 CAPSULE | Refills: 0 | Status: SHIPPED | OUTPATIENT
Start: 2025-06-25 | End: 2025-06-26

## 2025-06-25 RX ORDER — ATORVASTATIN CALCIUM 10 MG/1
10 TABLET, FILM COATED ORAL DAILY
Qty: 90 TABLET | Refills: 0 | Status: SHIPPED | OUTPATIENT
Start: 2025-06-25 | End: 2025-06-26

## 2025-06-26 RX ORDER — ATORVASTATIN CALCIUM 10 MG/1
10 TABLET, FILM COATED ORAL DAILY
Qty: 90 TABLET | Refills: 0 | Status: SHIPPED | OUTPATIENT
Start: 2025-06-26

## 2025-06-26 RX ORDER — TAMSULOSIN HYDROCHLORIDE 0.4 MG/1
0.4 CAPSULE ORAL DAILY
Qty: 90 CAPSULE | Refills: 0 | Status: SHIPPED | OUTPATIENT
Start: 2025-06-26

## 2025-06-26 NOTE — TELEPHONE ENCOUNTER
Failed Transmission on 6/25/25 & resent.    E-Prescribing Status: Receipt confirmed by pharmacy (6/26/2025  1:49 PM CDT)     Gabriela Whitaker RN BSN  [facility]

## 2025-06-27 ENCOUNTER — APPOINTMENT (OUTPATIENT)
Dept: INTERPRETER SERVICES | Facility: CLINIC | Age: OVER 89
End: 2025-06-27
Payer: COMMERCIAL

## 2025-07-10 ENCOUNTER — OFFICE VISIT (OUTPATIENT)
Dept: FAMILY MEDICINE | Facility: CLINIC | Age: OVER 89
End: 2025-07-10
Payer: COMMERCIAL

## 2025-07-10 VITALS
HEART RATE: 78 BPM | OXYGEN SATURATION: 100 % | DIASTOLIC BLOOD PRESSURE: 59 MMHG | WEIGHT: 130.5 LBS | TEMPERATURE: 97.6 F | BODY MASS INDEX: 25.62 KG/M2 | SYSTOLIC BLOOD PRESSURE: 130 MMHG | HEIGHT: 60 IN | RESPIRATION RATE: 12 BRPM

## 2025-07-10 DIAGNOSIS — G47.00 INSOMNIA, UNSPECIFIED TYPE: ICD-10-CM

## 2025-07-10 DIAGNOSIS — E78.5 HYPERLIPIDEMIA LDL GOAL <100: ICD-10-CM

## 2025-07-10 DIAGNOSIS — N40.1 BENIGN PROSTATIC HYPERPLASIA WITH LOWER URINARY TRACT SYMPTOMS, SYMPTOM DETAILS UNSPECIFIED: ICD-10-CM

## 2025-07-10 DIAGNOSIS — F03.918 DEMENTIA WITH BEHAVIORAL DISTURBANCE (H): ICD-10-CM

## 2025-07-10 DIAGNOSIS — R73.01 IMPAIRED FASTING GLUCOSE: ICD-10-CM

## 2025-07-10 DIAGNOSIS — Z23 ENCOUNTER FOR IMMUNIZATION: ICD-10-CM

## 2025-07-10 DIAGNOSIS — Z00.00 ENCOUNTER FOR MEDICARE ANNUAL WELLNESS EXAM: Primary | ICD-10-CM

## 2025-07-10 DIAGNOSIS — I10 HYPERTENSION GOAL BP (BLOOD PRESSURE) < 140/90: ICD-10-CM

## 2025-07-10 DIAGNOSIS — E11.9 TYPE 2 DIABETES MELLITUS WITHOUT COMPLICATION, WITHOUT LONG-TERM CURRENT USE OF INSULIN (H): ICD-10-CM

## 2025-07-10 DIAGNOSIS — R53.83 FATIGUE, UNSPECIFIED TYPE: ICD-10-CM

## 2025-07-10 DIAGNOSIS — E78.5 HYPERLIPIDEMIA WITH TARGET LDL LESS THAN 130: ICD-10-CM

## 2025-07-10 LAB
ALBUMIN SERPL BCG-MCNC: 3.9 G/DL (ref 3.5–5.2)
ALP SERPL-CCNC: 88 U/L (ref 40–150)
ALT SERPL W P-5'-P-CCNC: 17 U/L (ref 0–70)
ANION GAP SERPL CALCULATED.3IONS-SCNC: 9 MMOL/L (ref 7–15)
AST SERPL W P-5'-P-CCNC: 29 U/L (ref 0–45)
BASOPHILS # BLD AUTO: 0 10E3/UL (ref 0–0.2)
BASOPHILS NFR BLD AUTO: 1 %
BILIRUB SERPL-MCNC: 0.4 MG/DL
BUN SERPL-MCNC: 26.6 MG/DL (ref 8–23)
CALCIUM SERPL-MCNC: 9.3 MG/DL (ref 8.8–10.4)
CHLORIDE SERPL-SCNC: 105 MMOL/L (ref 98–107)
CHOLEST SERPL-MCNC: 141 MG/DL
CREAT SERPL-MCNC: 1 MG/DL (ref 0.67–1.17)
CREAT UR-MCNC: 221 MG/DL
EGFRCR SERPLBLD CKD-EPI 2021: 70 ML/MIN/1.73M2
EOSINOPHIL # BLD AUTO: 0.1 10E3/UL (ref 0–0.7)
EOSINOPHIL NFR BLD AUTO: 4 %
ERYTHROCYTE [DISTWIDTH] IN BLOOD BY AUTOMATED COUNT: 13.1 % (ref 10–15)
EST. AVERAGE GLUCOSE BLD GHB EST-MCNC: 126 MG/DL
FASTING STATUS PATIENT QL REPORTED: NO
FASTING STATUS PATIENT QL REPORTED: NO
GLUCOSE SERPL-MCNC: 79 MG/DL (ref 70–99)
HBA1C MFR BLD: 6 % (ref 0–5.6)
HCO3 SERPL-SCNC: 26 MMOL/L (ref 22–29)
HCT VFR BLD AUTO: 43.4 % (ref 40–53)
HDLC SERPL-MCNC: 48 MG/DL
HGB BLD-MCNC: 14.2 G/DL (ref 13.3–17.7)
IMM GRANULOCYTES # BLD: 0 10E3/UL
IMM GRANULOCYTES NFR BLD: 0 %
LDLC SERPL CALC-MCNC: 74 MG/DL
LYMPHOCYTES # BLD AUTO: 0.9 10E3/UL (ref 0.8–5.3)
LYMPHOCYTES NFR BLD AUTO: 22 %
MCH RBC QN AUTO: 30.9 PG (ref 26.5–33)
MCHC RBC AUTO-ENTMCNC: 32.7 G/DL (ref 31.5–36.5)
MCV RBC AUTO: 95 FL (ref 78–100)
MICROALBUMIN UR-MCNC: 23.7 MG/L
MICROALBUMIN/CREAT UR: 10.72 MG/G CR (ref 0–17)
MONOCYTES # BLD AUTO: 0.4 10E3/UL (ref 0–1.3)
MONOCYTES NFR BLD AUTO: 11 %
NEUTROPHILS # BLD AUTO: 2.5 10E3/UL (ref 1.6–8.3)
NEUTROPHILS NFR BLD AUTO: 63 %
NONHDLC SERPL-MCNC: 93 MG/DL
PLATELET # BLD AUTO: 234 10E3/UL (ref 150–450)
POTASSIUM SERPL-SCNC: 4.4 MMOL/L (ref 3.4–5.3)
PROT SERPL-MCNC: 6.8 G/DL (ref 6.4–8.3)
RBC # BLD AUTO: 4.59 10E6/UL (ref 4.4–5.9)
SODIUM SERPL-SCNC: 140 MMOL/L (ref 135–145)
TRIGL SERPL-MCNC: 97 MG/DL
TSH SERPL DL<=0.005 MIU/L-ACNC: 1.77 UIU/ML (ref 0.3–4.2)
WBC # BLD AUTO: 4 10E3/UL (ref 4–11)

## 2025-07-10 RX ORDER — TAMSULOSIN HYDROCHLORIDE 0.4 MG/1
0.4 CAPSULE ORAL DAILY
Qty: 90 CAPSULE | Refills: 3 | Status: SHIPPED | OUTPATIENT
Start: 2025-07-10

## 2025-07-10 RX ORDER — QUETIAPINE FUMARATE 25 MG/1
TABLET, FILM COATED ORAL
Qty: 180 TABLET | Refills: 3 | Status: SHIPPED | OUTPATIENT
Start: 2025-07-10

## 2025-07-10 RX ORDER — ATORVASTATIN CALCIUM 10 MG/1
10 TABLET, FILM COATED ORAL DAILY
Qty: 90 TABLET | Refills: 3 | Status: SHIPPED | OUTPATIENT
Start: 2025-07-10

## 2025-07-10 ASSESSMENT — ACTIVITIES OF DAILY LIVING (ADL): CURRENT_FUNCTION: NEEDS ASSISTANCE

## 2025-07-10 ASSESSMENT — PAIN SCALES - GENERAL: PAINLEVEL_OUTOF10: NO PAIN (0)

## 2025-07-10 NOTE — PROGRESS NOTES
Assessment & Plan     (Z00.00) Encounter for Medicare annual wellness exam  (primary encounter diagnosis)  Comment: discussed multiple issues with patient/daughter   Plan: as above     (E11.9) Type 2 diabetes mellitus without complication, without long-term current use of insulin (H)  Comment: checklabs   Plan: Albumin Random Urine Quantitative with Creat         Ratio, Hemoglobin A1c             (I10) Hypertension goal BP (blood pressure) < 140/90  Comment: blood pressure okay here on recheck   Plan: not needing med currently     (E78.5) Hyperlipidemia with target LDL less than 130  Comment: refill med, recheck labs    Plan: Lipid panel reflex to direct LDL Non-fasting,         atorvastatin (LIPITOR) 10 MG tablet             (Z23) Encounter for immunization  Comment: given  Plan: COVID-19 12+ (PFIZER), SCREENING QUESTIONS FOR         ADULT IMMUNIZATIONS          (E78.5) Hyperlipidemia LDL goal <100  Comment: check   Plan: Lipid panel reflex to direct LDL Non-fasting,         Comprehensive metabolic panel             (R53.83) Fatigue, unspecified type  Comment: check   Plan: CBC with Platelets & Differential, TSH with         free T4 reflex             (R73.01) Impaired fasting glucose  Comment: refill   Plan: metFORMIN (GLUCOPHAGE) 500 MG tablet             (F03.918) Dementia with behavioral disturbance (H)  Comment: refill ; working okay   Plan: QUEtiapine (SEROQUEL) 25 MG tablet             (G47.00) Insomnia, unspecified type  Comment: as above   Plan: QUEtiapine (SEROQUEL) 25 MG tablet             (N40.1) Benign prostatic hyperplasia with lower urinary tract symptoms, symptom details unspecified  Comment: stable. Refill med.   Plan: tamsulosin (FLOMAX) 0.4 MG capsule             BMI  Estimated body mass index is 25.49 kg/m  as calculated from the following:    Height as of this encounter: 1.524 m (5').    Weight as of this encounter: 59.2 kg (130 lb 8 oz).   Weight management plan: Discussed healthy diet and  exercise guidelines  Reviewed preventive health counseling, as reflected in patient instructions        Subjective   Thu is a 89 year old, presenting for the following health issues:  Recheck Medication and Wellness Visit        7/10/2025    10:42 AM   Additional Questions   Roomed by Jamila Catesner   Accompanied by Daughter     History of Present Illness       Reason for visit:  Annual checkup   He is taking medications regularly.          Annual Wellness Visit      Patient has been advised of split billing requirements and indicates understanding: Yes        Health Care Directive  Patient does not have a Health Care Directive: Discussed advance care planning with patient; however, patient declined at this time.  In general, how would you rate your overall physical health? (!) FAIR   Discussed with patient their rating of physical health; information has been provided.   Do you have a special diet?  Regular (no restrictions)        2024   Exercise, Social Connection, Stress   Days per week of moderate/strenous exercise 4 days    0 days   Average minutes spent exercising at this level 20 min    0 min   Frequency of gathering with friends or relatives Three times    Twice   Feel stress (tense, anxious, or unable to sleep) Not at all    To some exte       Multiple values from one day are sorted in reverse-chronological order     Do you see a dentist two times every year?  (!) NO  The patient was instructed to see the dentist every 6 months.   Have you been more tired than usual lately?  No  If you drink alcohol do you typically have >3 drinks per day or >7 drinks per week? No  Do you have a current opioid prescription? No  Do you use any other controlled substances or medications that are not prescribed by a provider? None  Social History     Tobacco Use    Smoking status: Former     Current packs/day: 0.00     Types: Cigarettes     Quit date: 9/3/1994     Years since quittin.8     Passive exposure: Never     Smokeless tobacco: Never   Vaping Use    Vaping status: Never Used   Substance Use Topics    Alcohol use: Yes     Comment: wine    Drug use: No       Needs assistance for the following daily activities: (!) TELEPHONE USE, (!) TRANSPORTATION, (!) PREPARING MEALS, (!) HOUSEWORK, (!) LAUNDRY, and (!) DRESSING  Which of the following safety concerns are present in your home?  none identified   Do you (or your family members) have any concerns about your safety while driving?  No  Do you have any of the following hearing concerns?: No hearing concerns  In the past 6 months, have you been bothered by leaking of urine? No        6/10/2024   Social Factors   Frequency of gathering with friends or relatives Once a week   Worry food won't last until get money to buy more No   Food not last or not have enough money for food? No   Do you have housing? (Housing is defined as stable permanent housing and does not include staying outside in a car, in a tent, in an abandoned building, in an overnight shelter, or couch-surfing.) Yes   Are you worried about losing your housing? No   Lack of transportation? No   Unable to get utilities (heat,electricity)? No          7/10/2025   Fall Risk   Fallen 2 or more times in the past year? Yes   Trouble with walking or balance? Yes   Gait Speed Test Interpretation Greater than 5.01 seconds - ABNORMAL           Today's PHQ-2 Score:       7/10/2025    10:32 AM   PHQ-2 ( 1999 [employer])   Q1: Little interest or pleasure in doing things 1   Q2: Feeling down, depressed or hopeless 1   PHQ-2 Score 2    Q1: Little interest or pleasure in doing things Several days   Q2: Feeling down, depressed or hopeless Several days   PHQ-2 Score 2       Patient-reported             Reviewed and updated as needed this visit by Provider                    Patient happier now    Wife moved back into house    Wife has to share bedroom with daughter as patient is up much of night    They get to share meals    Some  tantrums but not as bad    Everyone feels safe at home    Happier in summer     Goes to backyard    No change in meds    Uses cane or walker    Not using docusate much    Fell a few times in the last few months    No major injuries        Current providers sharing in care for this patient include:  Patient Care Team:  Arie Dahl MD as PCP - General (Family Practice)  Arie Dahl MD as Assigned PCP  Rodney Colorado MD as Assigned Surgical Provider  Lennie Saeed RN as Lead Care Coordinator    The following health maintenance items are reviewed in Epic and correct as of today:  Health Maintenance   Topic Date Due    DIABETIC FOOT EXAM  Never done    RSV VACCINE (1 - 1-dose 75+ series) Never done    ZOSTER VACCINE (2 of 3) 08/28/2009    A1C  09/10/2024    COVID-19 VACCINE (8 - 2024-25 season) 04/23/2025    MEDICARE ANNUAL WELLNESS VISIT  06/10/2025    BMP  06/10/2025    LIPID  06/10/2025    MICROALBUMIN  06/10/2025    ANNUAL REVIEW OF HM ORDERS  11/27/2025    INFLUENZA VACCINE (1) 09/01/2025    EYE EXAM  11/05/2025    FALL RISK ASSESSMENT  07/10/2026    ADVANCE CARE PLANNING  07/10/2030    DTAP/TDAP/TD VACCINE (2 - Td or Tdap) 11/01/2031    PHQ-2 (once per calendar year)  Completed    PNEUMOCOCCAL VACCINE 50+ YEARS  Completed    HPV VACCINE  Aged Out    MENINGITIS VACCINE  Aged Out       Appropriate preventive services were discussed with this patient, including applicable screening as appropriate for fall prevention, nutrition, physical activity, Tobacco-use cessation, weight loss and cognition.  Checklist reviewing preventive services available has been given to the patient.            7/10/2025   Mini Cog   Mini-Cog Not Completed (choose reason) Patient declines       Patient declines, but there are concerns for cognitive deficits.           Tired early in am  Not sleep well at night  They let him sleep     Patient almost blind one eye  He refuses to go to eye doctor              Objective    BP  (!) 151/76 (BP Location: Right arm, Patient Position: Chair, Cuff Size: Adult Regular)   Pulse 78   Temp 97.6  F (36.4  C) (Temporal)   Resp 12   Ht 1.524 m (5')   Wt 59.2 kg (130 lb 8 oz)   SpO2 100%   BMI 25.49 kg/m    Body mass index is 25.49 kg/m .  Physical Exam   GENERAL: most history obtained from daughter. Patient seems tired.  Needs some assistance getting onto and off of exam table.  EYES: Eyes grossly normal to inspection, PERRL and conjunctivae and sclerae normal  HENT: ear canals and TM's normal, nose and mouth without ulcers or lesions  NECK: no adenopathy, no asymmetry, masses, or scars  RESP: lungs clear to auscultation - no rales, rhonchi or wheezes  CV: regular rate and rhythm, normal S1 S2, no S3 or S4, no murmur, click or rub, no peripheral edema  ABDOMEN: soft, nontender, no hepatosplenomegaly, no masses and bowel sounds normal  MS: no gross musculoskeletal defects noted, no edema  SKIN: no suspicious lesions or rashes  NEURO: Normal strength and tone, mentation intact and speech normal  PSYCH: mentation appears normal, affect normal/bright             Signed Electronically by: Arie Dahl MD

## 2025-07-10 NOTE — PATIENT INSTRUCTIONS
"  Try to stay as active as possible    We will send you lab results    Stay on same oral medications    Advised seeing eye doctor soon in person    Advise getting shingles shot at pharmacy                  Patient Education   L?i Khuyên Ch?m Sóc D? Phòng  ?ây là l?i karli phillips tôi th??ng ??a ra ?? giúp m?i ng??i kh?e m?nh. Nhóm ch?m sóc c?a quý v? có th? có l?i khuyên c? th? dành rijeannie cho quý v?. Vui lòng trao ??i v?i nhóm ch?m sóc v? jake c?u ch?m sóc d? phòng c?a chính quý v?.  L?i s?ng  T?p th? d?c ít nh?t 150 phút m?i tu?n (30 phút m?i ngày, 5 ngày m?t tu?n).  Th?c hi?n các ho?t ??ng t?ng c??ng c? 2 ngày m?t tu?n. ?i?u này s? giúp quý v? ki?m soát cân n?ng và ng?n ng?a b?nh t?t.  Không hút thu?c.  Thoa andressa ch?ng n?ng ?? ng?n ng?a nadine th? da.  Ki?m tra m?c ?? radon eladia nhà t? 2 ??n 5 n?m m?t l?n. Radon là m?t lo?i khí không màu, không mùi có th? gây h?i cho ph?i c?a quý v?. ?? tìm hi?u thêm, hãy truy c?p [email or web address] includes the [facility].mn. và tìm ki?m \"Radon in Homes\" (Radon eladia nhà).  Gi? súng không n?p ??n và khóa l?i ?? ? n?i an toàn nh? két s?t ho?c h?m ch?a súng, ho?c s? d?ng khóa súng và c?t chìa khóa ?i. Luôn khóa và c?t ??n ? ch? riêng. ?? tìm hi?u thêm, hãy truy c?p [email or web address].[email or web address] và tìm ki?m \"safe gun storage\" (c?t gi? súng an toàn).  Jarek d??ng  ?n ít nh?t 5 kh?u ph?n trái cây và aaliyah qu? m?i ngày.  Hãy th? bánh mì lúa mì, g?o l?t và mì ?ng nguyên h?t (thay vì bánh mì tr?ng, g?o và mì ?ng).  N?p ?? canxi và vitamin D. Ki?m tra nhãn trên th?c ph?m và h??ng t?i 100% devon RDA (m?c tiêu th? hàng ngày ???c khuy?n ngh?).  Khám ??nh k?  Khám và v? sinh r?ng mi?ng 6 tháng m?t l?n.  G?p nhóm ch?m sóc s?c kh?e c?a quý v? hàng n?m?? trao ??i v?:  B?t k? thay ??i nào v? s?c kh?e c?a quý v?.  B?t k? lo?i thu?c nào mà nhóm ch?m sóc c?a quý v? ?ã kê ??n.  Ch?m sóc d? phòng, k? ho?ch hóa garcia ?ình và cách ng?n ng?a các b?nh mãn tính.  Tiêm ch?ng (v?c-fatou)   Tiêm phòng HPV (??n 26 tu?i), n?u quý v? ch?a t?ng tiêm lo?i v?c-fatou " này tr??c ?ó.  Tiêm phòng viêm hari B (??n 59 tu?i), n?u quý v? ch?a t?ng tiêm lo?i v?c-fatou này tr??c ?ó.  Tiêm phòng COVID-19: Tiêm v?c-fatou này khi ??n h?n.  Tiêm phòng cúm: Tiêm phòng cúm hàng n?m.  Tiêm phòng u?n ván: Tiêm phòng u?n ván 10 n?m m?t l?n.  Tiêm phòng ph? c?u khu?n, viêm hari A và RSV: Hãy h?i nhóm ch?m sóc c?a quý v? xem li?u quý v? có c?n nh?ng m?i tiêm này hay không d?a trên nguy c? rich?m b?nh c?a quý v?.  Tiêm phòng Brie th?n kinh (dàn cho tu?i t? 50 tr? lên).  Xét woodrow?m s?c kh?e t?ng quát  Sàng l?c b?nh ti?u ???ng:  B?t ??u t? tu?i 35, Khám sàng l?c b?nh ti?u ???ng ít nh?t 3 n?m m?t l?n.  N?u quý v? d??i 35 tu?i, hãy h?i nhóm ch?m sóc xem quý v? có nên sàng l?c b?nh ti?u ???ng hay không.  Xét woodrow?m cholesterol: T? tu?i 39, b?t ??u xét woodrow?m cholesterol 5 n?m m?t l?n, ho?c th??ng xuyên h?n n?u ???c khuy?n ngh?.  ?o m?t ?? x??ng (DEXA): ? tu?i 50, hãy h?i nhóm ch?m sóc c?a quý v? xem quý v? có nên th?c hi?n xét woodrow?m này ?? phát hi?n b?nh loãng x??ng (x??ng giòn) hay không.  Viêm hari C: ?i xét woodrow?m ít nh?t m?t l?n eladia ??i.  Khám sàng l?c phình ??ng m?ch ch? b?ng: Trao ??i v?i bác s? c?a quý v? v? vi?c th?c hi?n sàng l?c này n?u quý v?:  ?ã t?ng hút thu?c; và  Là nam gi?i v? m?t sinh h?c; và  Eladia ?? tu?i t? 65 ??n 75.  STI (b?nh rich?m trùng ervin ???ng tình d?c)  Tr??c 24 tu?i: Hãy h?i nhóm ch?m sóc c?a quý v? xem quý v? có nên khám sàng l?c STI hay không.  Aretha 24 tu?i: Sàng l?c STI n?u quý v? có nguy c? m?c b?nh. Quý v? có nguy c? m?c các b?nh STI (umm g?m c? HIV) n?u:  Quý v? có pete h? tình d?c tich c?c v?i h?n m?t ng??i.  Quý v? không s? d?ng umm adler hernandez m?i lúc.  Quý v? ho?c b?n tình ???c ch?n ?oán m?c b?nh rich?m b?nh lây ervin ???ng tình d?c.  N?u quý v? có nguy c? rich?m HIV, hãy h?i bari thu?c PrEP ?? ng?n ng?a HIV.  ?i xét woodrow?m HIV ít nh?t m?t l?n eladia ??i, cho dù quý v? có nguy c? rich?m HIV hay không.  Xét woodrow?m sàng l?c nadine th?  Sàng l?c nadine th? c? t? cung: N?u quý v? có c? t?  cung, hãy b?t ??u làm xét woodrow?m sàng l?c nadine th? c? t? cung th??ng xuyên t? tu?i 21. H?u h?t nh?ng ng??i khám sàng l?c th??ng xuyên v?i k?t qu? bình th??ng ??u có th? ng?ng khám caryl 65 tu?i. Hãy trao ??i v? v?n ?? này v?i bác s? c?a quý v?.  Quét nadine th? vú (ch?p juan daria?n vú): N?u quý v? có hay t?ng có vú, hãy b?t ??u ch?p juan daria?n vú th??ng xuyên b?t ??u t? tu?i 40. ?ây là quá trình quét ?? ki?m tra nadine th? vú.  Sàng l?c nadine th? ??i tràng: C?n b?t ??u sàng l?c nadine th? ??i tràng t? tu?i 45.  Th?c hi?n xét woodrow?m n?i soi ??i tràng 10 n?m m?t l?n (ho?c th??ng xuyên h?n n?u quý v? có nguy c? m?c b?nh) Ho?c, hãy h?i nhà cung c?p c?a quý v? v? các xét woodrow?m phân nh? xét woodrow?m FIT hàng n?m ho?c xét woodrow?m Cologuard 3 n?m m?t l?n.  ?? tìm hi?u thêm v? các tùy ch?n th? woodrow?m c?a quý v?, hãy truy c?p: [email or web address].  ?? ???c h? tr? ??a ra kamla?t ??nh, hãy truy c?p: bit.ly/dp63285.  Xét woodrow?m sàng l?c nadine th? daria?n ti?n li?t: N?u quý v? có daria?n ti?n li?t và ? ?? tu?i t? 55 ??n 69, hãy h?i bác s? xem vi?c xét woodrow?m sàng l?c nadine th? daria?n ti?n li?t hàng n?m có ?em l?i l?i ích gì cho quý v? hay không.  Sàng l?c nadine th? ph?i: N?u quý v? ?ã t?ng ho?c còn ?ang hút thu?c và t? 50 ??n 80 tu?i, hãy h?i nhóm ch?m sóc c?a quý v? xem vi?c sàng l?c nadine th? ph?i th??ng xuyên có phù h?p v?i quý v? hay không.    Ch? nh?m m?c ?ích stella kh?o. Không th? thay th? l?i khuyên c?a nhà cung c?p d?ch v? ch?m sóc s?c kh?e c?a quý v?. B?n kamla?n   2023 [facility].   B?o l?u m?i kamla?n. ???c ?ánh rogelioá lâm sàng b?i [facility]. [employer] [id number] - REV 04/24.  Learning About Activities of Daily Living  What are activities of daily living?     Activities of daily living (ADLs) are the basic self-care tasks you do every day. These include eating, bathing, dressing, and moving around.  As you age, and if you have health problems, you may find that it's harder to do some of these tasks. If so,  your doctor can suggest ideas that may help.  To measure what kind of help you may need, your doctor will ask how well you are able to do ADLs. Let your doctor know if there are any tasks that you are having trouble doing. This is an important first step to getting help. And when you have the help you need, you can stay as independent as possible.  How will a doctor assess your ADLs?  Asking about ADLs is part of a routine health checkup your doctor will likely do as you age. Your health check might be done in a doctor's office, in your home, or at a hospital. The goal is to find out if you are having any problems that could make it hard to care for yourself or that make it unsafe for you to be on your own.  To measure your ADLs, your doctor will ask how hard it is for you to do routine tasks. Your doctor may also want to know if you have changed the way you do a task because of a health problem. Your doctor may watch how you:  Walk back and forth.  Keep your balance while you stand or walk.  Move from sitting to standing or from a bed to a chair.  Button or unbutton a shirt or sweater.  Remove and put on your shoes.  It's common to feel a little worried or anxious if you find you can't do all the things you used to be able to do. Talking with your doctor about ADLs is a way to make sure you're as safe as possible and able to care for yourself as well as you can. You may want to bring a caregiver, friend, or family member to your checkup. They can help you talk to your doctor.  Follow-up care is a key part of your treatment and safety. Be sure to make and go to all appointments, and call your doctor if you are having problems. It's also a good idea to know your test results and keep a list of the medicines you take.  Current as of: October 24, 2024  Content Version: 14.5    4029-2592 [employer].   Care instructions adapted under license by your healthcare professional. If you have questions about a  medical condition or this instruction, always ask your healthcare professional. [employer] disclaims any warranty or liability for your use of this information.    Preventing Falls: Care Instructions  Injuries and health problems such as trouble walking or poor eyesight can increase your risk of falling. So can some medicines. But there are things you can do to help prevent falls. You can exercise to get stronger. You can also arrange your home to make it safer.    Talk to your doctor about the medicines you take. Ask if any of them increase the risk of falls and whether they can be changed or stopped.   Try to exercise regularly. It can help improve your strength and balance. This can help lower your risk of falling.         Practice fall safety and prevention.   Wear low-heeled shoes that fit well and give your feet good support. Talk to your doctor if you have foot problems that make this hard.  Carry a cellphone or wear a medical alert device that you can use to call for help.  Use stepladders instead of chairs to reach high objects. Don't climb if you're at risk for falls. Ask for help, if needed.  Wear the correct eyeglasses, if you need them.        Make your home safer.   Remove rugs, cords, clutter, and furniture from walkways.  Keep your house well lit. Use night-lights in hallways and bathrooms.  Install and use sturdy handrails on stairways.  Wear nonskid footwear, even inside. Don't walk barefoot or in socks without shoes.        Be safe outside.   Use handrails, curb cuts, and ramps whenever possible.  Keep your hands free by using a shoulder bag or backpack.  Try to walk in well-lit areas. Watch out for uneven ground, changes in pavement, and debris.  Be careful in the winter. Walk on the grass or gravel when sidewalks are slippery. Use de-icer on steps and walkways. Add non-slip devices to shoes.    Put grab bars and nonskid mats in your shower or tub and near the toilet. Try to use a  "shower chair or bath bench when bathing.   Get into a tub or shower by putting in your weaker leg first. Get out with your strong side first. Have a phone or medical alert device in the bathroom with you.   Where can you learn more?  Go to https://www.[employer].net/patiented  Enter G117 in the search box to learn more about \"Preventing Falls: Care Instructions.\"  Current as of: July 31, 2024  Content Version: 14.5 2024-2025 [employer].   Care instructions adapted under license by your healthcare professional. If you have questions about a medical condition or this instruction, always ask your healthcare professional. [employer] disclaims any warranty or liability for your use of this information.       "

## 2025-07-29 DIAGNOSIS — K59.00 CONSTIPATION, UNSPECIFIED CONSTIPATION TYPE: ICD-10-CM

## 2025-07-29 RX ORDER — DOCUSATE SODIUM 100 MG/1
CAPSULE, LIQUID FILLED ORAL
Qty: 60 CAPSULE | Refills: 1 | Status: SHIPPED | OUTPATIENT
Start: 2025-07-29

## 2025-07-31 DIAGNOSIS — H40.1131 PRIMARY OPEN ANGLE GLAUCOMA (POAG) OF BOTH EYES, MILD STAGE: ICD-10-CM

## 2025-07-31 RX ORDER — DORZOLAMIDE HYDROCHLORIDE AND TIMOLOL MALEATE 20; 5 MG/ML; MG/ML
1 SOLUTION/ DROPS OPHTHALMIC 2 TIMES DAILY
Qty: 10 ML | Refills: 4 | Status: SHIPPED | OUTPATIENT
Start: 2025-07-31

## 2025-07-31 NOTE — TELEPHONE ENCOUNTER
Refilling Ok'd verbally per Dr. Colorado's and last visit note 11/05/2024 continue:  Cosopt (dorzolamide-timolol -- dark blue top) twice daily left eye.    Brimonidine (purple top) twice daily left eye.

## 2025-08-20 ENCOUNTER — TELEPHONE (OUTPATIENT)
Dept: FAMILY MEDICINE | Facility: CLINIC | Age: OVER 89
End: 2025-08-20
Payer: COMMERCIAL